# Patient Record
Sex: FEMALE | Race: BLACK OR AFRICAN AMERICAN | Employment: OTHER | URBAN - NONMETROPOLITAN AREA
[De-identification: names, ages, dates, MRNs, and addresses within clinical notes are randomized per-mention and may not be internally consistent; named-entity substitution may affect disease eponyms.]

---

## 2021-04-14 ENCOUNTER — HOSPITAL ENCOUNTER (OUTPATIENT)
Dept: GENERAL RADIOLOGY | Age: 81
Discharge: HOME | End: 2021-04-14
Payer: MEDICARE

## 2021-04-14 ENCOUNTER — TRANSCRIBE ORDER (OUTPATIENT)
Dept: REGISTRATION | Age: 81
End: 2021-04-14

## 2021-04-14 DIAGNOSIS — J44.1 OBSTRUCTIVE CHRONIC BRONCHITIS WITH EXACERBATION (HCC): Primary | ICD-10-CM

## 2021-04-14 DIAGNOSIS — J44.1 OBSTRUCTIVE CHRONIC BRONCHITIS WITH EXACERBATION (HCC): ICD-10-CM

## 2021-04-14 PROCEDURE — 71046 X-RAY EXAM CHEST 2 VIEWS: CPT

## 2021-10-27 ENCOUNTER — TRANSCRIBE ORDER (OUTPATIENT)
Dept: REGISTRATION | Age: 81
End: 2021-10-27

## 2021-10-27 ENCOUNTER — HOSPITAL ENCOUNTER (OUTPATIENT)
Dept: GENERAL RADIOLOGY | Age: 81
Discharge: HOME | End: 2021-10-27
Payer: MEDICARE

## 2021-10-27 DIAGNOSIS — J44.1 OBSTRUCTIVE CHRONIC BRONCHITIS WITH EXACERBATION (HCC): ICD-10-CM

## 2021-10-27 DIAGNOSIS — J44.1 OBSTRUCTIVE CHRONIC BRONCHITIS WITH EXACERBATION (HCC): Primary | ICD-10-CM

## 2021-10-27 PROCEDURE — 71046 X-RAY EXAM CHEST 2 VIEWS: CPT

## 2022-01-10 ENCOUNTER — HOSPITAL ENCOUNTER (EMERGENCY)
Age: 82
Discharge: HOME | End: 2022-01-10
Attending: EMERGENCY MEDICINE
Payer: MEDICARE

## 2022-01-10 ENCOUNTER — APPOINTMENT (OUTPATIENT)
Dept: GENERAL RADIOLOGY | Age: 82
End: 2022-01-10
Attending: EMERGENCY MEDICINE
Payer: MEDICARE

## 2022-01-10 VITALS
BODY MASS INDEX: 32.02 KG/M2 | HEART RATE: 73 BPM | HEIGHT: 62 IN | TEMPERATURE: 98.4 F | DIASTOLIC BLOOD PRESSURE: 66 MMHG | SYSTOLIC BLOOD PRESSURE: 156 MMHG | OXYGEN SATURATION: 95 % | WEIGHT: 174 LBS | RESPIRATION RATE: 20 BRPM

## 2022-01-10 DIAGNOSIS — R07.9 CHEST PAIN, UNSPECIFIED TYPE: ICD-10-CM

## 2022-01-10 DIAGNOSIS — J45.901 PERSISTENT ASTHMA WITH ACUTE EXACERBATION, UNSPECIFIED ASTHMA SEVERITY: Primary | ICD-10-CM

## 2022-01-10 LAB
ALBUMIN SERPL-MCNC: 4.2 G/DL (ref 3.5–4.7)
ALBUMIN/GLOB SERPL: 1.1 {RATIO}
ALP SERPL-CCNC: 77 U/L (ref 38–126)
ALT SERPL-CCNC: 12 U/L (ref 3–52)
ANION GAP SERPL CALC-SCNC: 11 MMOL/L
AST SERPL W P-5'-P-CCNC: 17 U/L (ref 14–74)
BASOPHILS # BLD: 0 K/UL (ref 0–0.1)
BASOPHILS NFR BLD: 0 % (ref 0–2)
BILIRUB SERPL-MCNC: 0.7 MG/DL (ref 0.2–1)
BNP SERPL-MCNC: 160 PG/ML (ref 0–100)
BUN SERPL-MCNC: 9 MG/DL (ref 9–21)
BUN/CREAT SERPL: 9
CA-I BLD-MCNC: 9.8 MG/DL (ref 8.5–10.5)
CHLORIDE SERPL-SCNC: 101 MMOL/L (ref 94–111)
CO2 SERPL-SCNC: 26 MMOL/L (ref 21–33)
CREAT SERPL-MCNC: 0.99 MG/DL (ref 0.7–1.2)
D DIMER PPP FEU-MCNC: 0.8 UG/ML(FEU)
DIFFERENTIAL METHOD BLD: ABNORMAL
EOSINOPHIL # BLD: 0.8 K/UL (ref 0–0.4)
EOSINOPHIL NFR BLD: 7 % (ref 0–5)
ERYTHROCYTE [DISTWIDTH] IN BLOOD BY AUTOMATED COUNT: 14 % (ref 11.6–14.5)
GLOBULIN SER CALC-MCNC: 4 G/DL
GLUCOSE SERPL-MCNC: 115 MG/DL (ref 70–110)
HCT VFR BLD AUTO: 44.8 % (ref 35–45)
HGB BLD-MCNC: 14 G/DL (ref 12–16)
IMM GRANULOCYTES # BLD AUTO: 0 K/UL
IMM GRANULOCYTES NFR BLD AUTO: 0 %
INR PPP: 1.1 (ref 0.8–1.2)
LYMPHOCYTES # BLD: 2.6 K/UL (ref 0.9–3.6)
LYMPHOCYTES NFR BLD: 24 % (ref 21–52)
MCH RBC QN AUTO: 29.7 PG (ref 24–34)
MCHC RBC AUTO-ENTMCNC: 31.3 G/DL (ref 31–37)
MCV RBC AUTO: 94.9 FL (ref 78–100)
MONOCYTES # BLD: 1.3 K/UL (ref 0.05–1.2)
MONOCYTES NFR BLD: 12 % (ref 3–10)
NEUTS SEG # BLD: 6.3 K/UL (ref 1.8–8)
NEUTS SEG NFR BLD: 57 % (ref 40–73)
NRBC # BLD: 0 K/UL (ref 0–0.01)
NRBC BLD-RTO: 0 PER 100 WBC
PLATELET # BLD AUTO: 235 K/UL (ref 135–420)
PLATELET COMMENTS,PCOM: ADEQUATE
PMV BLD AUTO: 11 FL (ref 9.2–11.8)
POTASSIUM SERPL-SCNC: 4 MMOL/L (ref 3.2–5.1)
PROT SERPL-MCNC: 8.2 G/DL (ref 6.1–8.4)
PROTHROMBIN TIME: 13.5 SEC (ref 11.5–15.2)
RBC # BLD AUTO: 4.72 M/UL (ref 4.2–5.3)
RBC MORPH BLD: ABNORMAL
SODIUM SERPL-SCNC: 138 MMOL/L (ref 135–145)
TROPONIN I SERPL-MCNC: 0.04 NG/ML (ref 0.02–0.05)
WBC # BLD AUTO: 11 K/UL (ref 4.6–13.2)

## 2022-01-10 PROCEDURE — 99285 EMERGENCY DEPT VISIT HI MDM: CPT

## 2022-01-10 PROCEDURE — 94640 AIRWAY INHALATION TREATMENT: CPT

## 2022-01-10 PROCEDURE — 83880 ASSAY OF NATRIURETIC PEPTIDE: CPT

## 2022-01-10 PROCEDURE — 93005 ELECTROCARDIOGRAM TRACING: CPT

## 2022-01-10 PROCEDURE — 96374 THER/PROPH/DIAG INJ IV PUSH: CPT

## 2022-01-10 PROCEDURE — 74011000250 HC RX REV CODE- 250: Performed by: EMERGENCY MEDICINE

## 2022-01-10 PROCEDURE — 74011250637 HC RX REV CODE- 250/637: Performed by: EMERGENCY MEDICINE

## 2022-01-10 PROCEDURE — 71045 X-RAY EXAM CHEST 1 VIEW: CPT

## 2022-01-10 PROCEDURE — 85379 FIBRIN DEGRADATION QUANT: CPT

## 2022-01-10 PROCEDURE — 85025 COMPLETE CBC W/AUTO DIFF WBC: CPT

## 2022-01-10 PROCEDURE — 80053 COMPREHEN METABOLIC PANEL: CPT

## 2022-01-10 PROCEDURE — 84484 ASSAY OF TROPONIN QUANT: CPT

## 2022-01-10 PROCEDURE — 74011250636 HC RX REV CODE- 250/636: Performed by: EMERGENCY MEDICINE

## 2022-01-10 PROCEDURE — 36415 COLL VENOUS BLD VENIPUNCTURE: CPT

## 2022-01-10 PROCEDURE — 85610 PROTHROMBIN TIME: CPT

## 2022-01-10 RX ORDER — BENZONATATE 100 MG/1
100 CAPSULE ORAL
Status: COMPLETED | OUTPATIENT
Start: 2022-01-10 | End: 2022-01-10

## 2022-01-10 RX ORDER — IPRATROPIUM BROMIDE AND ALBUTEROL SULFATE 2.5; .5 MG/3ML; MG/3ML
3 SOLUTION RESPIRATORY (INHALATION)
Status: COMPLETED | OUTPATIENT
Start: 2022-01-10 | End: 2022-01-10

## 2022-01-10 RX ORDER — PREDNISONE 20 MG/1
60 TABLET ORAL DAILY
Qty: 12 TABLET | Refills: 0 | Status: SHIPPED | OUTPATIENT
Start: 2022-01-10 | End: 2022-01-14

## 2022-01-10 RX ORDER — BENZONATATE 100 MG/1
100 CAPSULE ORAL
Qty: 20 CAPSULE | Refills: 0 | Status: SHIPPED | OUTPATIENT
Start: 2022-01-10 | End: 2022-01-17

## 2022-01-10 RX ORDER — IPRATROPIUM BROMIDE AND ALBUTEROL SULFATE 2.5; .5 MG/3ML; MG/3ML
3 SOLUTION RESPIRATORY (INHALATION)
Status: DISCONTINUED | OUTPATIENT
Start: 2022-01-10 | End: 2022-01-10

## 2022-01-10 RX ORDER — ALBUTEROL SULFATE 90 UG/1
1-2 AEROSOL, METERED RESPIRATORY (INHALATION)
Qty: 1 G | Refills: 0 | Status: SHIPPED | OUTPATIENT
Start: 2022-01-10 | End: 2022-09-01

## 2022-01-10 RX ADMIN — BENZONATATE 100 MG: 100 CAPSULE ORAL at 20:56

## 2022-01-10 RX ADMIN — METHYLPREDNISOLONE SODIUM SUCCINATE 125 MG: 125 INJECTION, POWDER, FOR SOLUTION INTRAMUSCULAR; INTRAVENOUS at 18:28

## 2022-01-10 RX ADMIN — IPRATROPIUM BROMIDE AND ALBUTEROL SULFATE 3 ML: .5; 2.5 SOLUTION RESPIRATORY (INHALATION) at 20:58

## 2022-01-10 RX ADMIN — IPRATROPIUM BROMIDE AND ALBUTEROL SULFATE 3 ML: .5; 2.5 SOLUTION RESPIRATORY (INHALATION) at 18:14

## 2022-01-10 NOTE — ED TRIAGE NOTES
Pt states she is having some asthma exacerbation causing shortness of breath and has had chest pain that started yesterday.

## 2022-01-10 NOTE — ED PROVIDER NOTES
EMERGENCY DEPARTMENT HISTORY AND PHYSICAL EXAM      Date: 1/10/2022  Patient Name: Ella Carvajal      History of Presenting Illness     Chief Complaint   Patient presents with    Shortness of Breath    Chest Pain       History Provided By: Patient    HPI: Ella Carvajal, 78 y.o. female with a past medical history significant COPD and asthma presents to the ED with cc of SOB, Cough productive of thick yellow phlegm Started today. Has used his neb treatment multiple times but is not getting any better. No fever no chills no NVD no anosmia, ageusia. There are no other complaints, changes, or physical findings at this time. PCP: Pat Tran MD    Current Outpatient Medications   Medication Sig Dispense Refill    albuterol (PROVENTIL HFA, VENTOLIN HFA, PROAIR HFA) 90 mcg/actuation inhaler Take 1-2 Puffs by inhalation every four (4) hours as needed for Wheezing. 1 g 0    benzonatate (Tessalon Perles) 100 mg capsule Take 1 Capsule by mouth three (3) times daily as needed for Cough for up to 7 days. 20 Capsule 0    Aspirin, Buffered 81 mg tab Take  by mouth.  b complex-vitamin c-folic acid 0.8 mg (NEPHRO-FLEX) 0.8 mg tab tablet Take 1 tablet by mouth two (2) times a day.  famotidine (PEPCID) 40 mg tablet Take 40 mg by mouth daily.  alprazolam (XANAX) 0.5 mg tablet Take 0.5 mg by mouth two (2) times daily as needed for Anxiety.  verapamil (CALAN) 120 mg tablet Take 120 mg by mouth daily.  losartan (COZAAR) 50 mg tablet Take 50 mg by mouth daily.  mometasone-formoterol (DULERA) 200-5 mcg/actuation HFA inhaler Take 2 puffs by inhalation two (2) times a day.  fluticasone (FLONASE) 50 mcg/actuation nasal spray 2 sprays by Both Nostrils route nightly.          Past History     Past Medical History:  Past Medical History:   Diagnosis Date    Allergic rhinitis     Arthritis     knee - pending surg    Asthma     Chronic obstructive pulmonary disease (HCC)     mild per pulmonary notes    GERD (gastroesophageal reflux disease)     Hypertension     Thromboembolus ([address]     many years ago    Thyroid disease     cyst on thyroid       Past Surgical History:  Past Surgical History:   Procedure Laterality Date    HX APPENDECTOMY  age 23   [de-identified] HYSTERECTOMY  age 35    HX KNEE REPLACEMENT Left 2011    Dr. Cecelia Pardo Left age 23    inguinal       Family History:  History reviewed. No pertinent family history. Social History:  Social History     Tobacco Use    Smoking status: Former Smoker     Packs/day: 1.00    Smokeless tobacco: Never Used    Tobacco comment: smoke for appox 18 months at age 23   Substance Use Topics    Alcohol use: Yes     Comment: rarely    Drug use: No       Allergies: Allergies   Allergen Reactions    Ace Inhibitors Other (comments)     Not sure, was a long time ago    Ceclor [Cefaclor] Hives and Itching    Cephalosporins Rash and Itching    Ketek [Telithromycin] Hives and Itching         Review of Systems     Review of Systems   Constitutional: Negative. HENT: Negative. Respiratory: Positive for cough, shortness of breath and wheezing. Cardiovascular: Negative. Gastrointestinal: Negative. Genitourinary: Negative. Musculoskeletal: Negative. Neurological: Negative. All other systems reviewed and are negative. Physical Exam     Physical Exam  Vitals and nursing note reviewed. Constitutional:       Appearance: She is well-developed. HENT:      Head: Normocephalic and atraumatic. Cardiovascular:      Rate and Rhythm: Normal rate and regular rhythm. Pulmonary:      Effort: Tachypnea and respiratory distress present. Breath sounds: Decreased breath sounds and rhonchi present. Chest:      Chest wall: No mass or tenderness. Abdominal:      Palpations: Abdomen is soft. Tenderness: There is no abdominal tenderness. Skin:     General: Skin is warm and dry.    Neurological:      General: No focal deficit present. Mental Status: She is alert and oriented to person, place, and time. Lab and Diagnostic Study Results     Labs -   No results found for this or any previous visit (from the past 12 hour(s)). Radiologic Studies -   [unfilled]  CT Results  (Last 48 hours)    None        CXR Results  (Last 48 hours)    None          Medical Decision Making and ED Course   - I am the first and primary provider for this patient AND AM THE PRIMARY PROVIDER OF RECORD. - I reviewed the vital signs, available nursing notes, past medical history, past surgical history, family history and social history. - Initial assessment performed. The patients presenting problems have been discussed, and the staff are in agreement with the care plan formulated and outlined with them. I have encouraged them to ask questions as they arise throughout their visit. Vital Signs-Reviewed the patient's vital signs. No data found. EKG interpretation: (Preliminary): Performed at 5:58p, and read at 5:58p  Rhythm: sinus tachycardia; and regular . Rate (approx.): 110; Axis: normal; DC interval: normal; QRS interval: normal ; ST/T wave: non-specific changes; Other findings: left ventricular hypertrophy.       Records Reviewed: Nursing Notes and Old Medical Records    The patient presents with     ED Course:              Provider Notes (Medical Decision Making):     MDM  Number of Diagnoses or Management Options     Amount and/or Complexity of Data Reviewed  Clinical lab tests: ordered  Tests in the radiology section of CPT®: ordered    Risk of Complications, Morbidity, and/or Mortality  Presenting problems: high  Diagnostic procedures: high  Management options: high  General comments: Endorsed to Dr Ruby Liu at 7 05pm               Consultations:       Consultations:         Procedures and Critical Care       Performed by: Traci Lynch MD  PROCEDURES:  Procedures                     Disposition     Disposition: [place] Discharge: I informed the pt that they needed admission, further observation and further workup for adequate evaluation for their Asthma/SOB and that by refusing the above, they at risk for myocardial infarction, sudden death and further deterioration. They are awake, alert, and they understand their condition and the risks involved in leaving. They are clinically aware of their surroundings and able to ask appropriate questions. The patients  and the nurse present confirms They are not clinically intoxicated and able to make medical decisions. They have verbalized knowing the risks and understood it was recommended that they stay and could also return at any time. The patient's  was present for the discussion and also verbalized that they understood both diagnosis, risks and could return at any time. They were both provided with warnings regarding worsening of Their condition and were provided instructions to follow up with their PCP tomorrow or return to the Emergency Room as soon as possible. This discussion was witnessed by nurse RN. Discharged    Remove if not discharged  DISCHARGE PLAN:  1. Current Discharge Medication List      CONTINUE these medications which have NOT CHANGED    Details   Aspirin, Buffered 81 mg tab Take  by mouth.      b complex-vitamin c-folic acid 0.8 mg (NEPHRO-FLEX) 0.8 mg tab tablet Take 1 tablet by mouth two (2) times a day. famotidine (PEPCID) 40 mg tablet Take 40 mg by mouth daily. alprazolam (XANAX) 0.5 mg tablet Take 0.5 mg by mouth two (2) times daily as needed for Anxiety. verapamil (CALAN) 120 mg tablet Take 120 mg by mouth daily. losartan (COZAAR) 50 mg tablet Take 50 mg by mouth daily. mometasone-formoterol (DULERA) 200-5 mcg/actuation HFA inhaler Take 2 puffs by inhalation two (2) times a day. fluticasone (FLONASE) 50 mcg/actuation nasal spray 2 sprays by Both Nostrils route nightly.       albuterol (VENTOLIN HFA) 90 mcg/actuation inhaler Take 2 puffs by inhalation every four (4) hours as needed for Wheezing. 2.   Follow-up Information     Follow up With Specialties Details Why Contact Info    [facility] EMERGENCY DEPT Emergency Medicine Go to  As needed, If symptoms worsen [place] [address] [place] [address], [address], MD Internal Medicine   [address]  [address]  [address] [facility]  [phone]          3. Return to ED if worse   4. Discharge Medication List as of 1/10/2022  9:40 PM      START taking these medications    Details   albuterol (PROVENTIL HFA, VENTOLIN HFA, PROAIR HFA) 90 mcg/actuation inhaler Take 1-2 Puffs by inhalation every four (4) hours as needed for Wheezing., Normal, Disp-1 g, R-0      benzonatate (Tessalon Perles) 100 mg capsule Take 1 Capsule by mouth three (3) times daily as needed for Cough for up to 7 days. , Normal, Disp-20 Capsule, R-0      predniSONE (DELTASONE) 20 mg tablet Take 60 mg by mouth daily for 4 days. , Normal, Disp-12 Tablet, R-0         CONTINUE these medications which have NOT CHANGED    Details   Aspirin, Buffered 81 mg tab Take  by mouth., Historical Med      b complex-vitamin c-folic acid 0.8 mg (NEPHRO-FLEX) 0.8 mg tab tablet Take 1 tablet by mouth two (2) times a day., Historical Med      famotidine (PEPCID) 40 mg tablet Take 40 mg by mouth daily. , Historical Med      alprazolam (XANAX) 0.5 mg tablet Take 0.5 mg by mouth two (2) times daily as needed for Anxiety. , Historical Med      verapamil (CALAN) 120 mg tablet Take 120 mg by mouth daily. , Historical Med      losartan (COZAAR) 50 mg tablet Take 50 mg by mouth daily. , Historical Med      mometasone-formoterol (DULERA) 200-5 mcg/actuation HFA inhaler Take 2 puffs by inhalation two (2) times a day., Historical Med      fluticasone (FLONASE) 50 mcg/actuation nasal spray 2 sprays by Both Nostrils route nightly., Historical Med             Diagnosis     Clinical Impression:   1.  Persistent asthma with acute exacerbation, unspecified asthma severity    2. Chest pain, unspecified type        Attestations:    Ho Alberto MD    Please note that this dictation was completed with [employer], the computer voice recognition software. Quite often unanticipated grammatical, syntax, homophones, and other interpretive errors are inadvertently transcribed by the computer software. Please disregard these errors. Please excuse any errors that have escaped final proofreading. Thank you.

## 2022-01-11 NOTE — DISCHARGE INSTRUCTIONS
Return for further observation/admission if you change your mind. Also for pain, fever not resolving with motrin or tylenol, shortness of breath, vomiting, decreased fluid intake, weakness, numbness, dizziness, or any change or concerns.

## 2022-01-11 NOTE — ED NOTES
9:40 PM pt denies current sx's except mild cough, feels much better, declines staying for repeat labs inc trop and testing/admission. Wants dc now, says much better. Pt a and x 3, to dc per pt. Urged to ret for any sx's. Dc per pt w detailed ret inst given.

## 2022-01-12 LAB
ATRIAL RATE: 110 BPM
CALCULATED P AXIS, ECG09: 65 DEGREES
CALCULATED R AXIS, ECG10: 60 DEGREES
CALCULATED T AXIS, ECG11: -84 DEGREES
DIAGNOSIS, 93000: NORMAL
P-R INTERVAL, ECG05: 180 MS
Q-T INTERVAL, ECG07: 295 MS
QRS DURATION, ECG06: 82 MS
QTC CALCULATION (BEZET), ECG08: 400 MS
VENTRICULAR RATE, ECG03: 110 BPM

## 2022-09-01 ENCOUNTER — APPOINTMENT (OUTPATIENT)
Dept: CT IMAGING | Age: 82
End: 2022-09-01
Attending: EMERGENCY MEDICINE
Payer: MEDICARE

## 2022-09-01 ENCOUNTER — HOSPITAL ENCOUNTER (OUTPATIENT)
Age: 82
Setting detail: OBSERVATION
Discharge: HOME | End: 2022-09-04
Attending: EMERGENCY MEDICINE | Admitting: INTERNAL MEDICINE
Payer: MEDICARE

## 2022-09-01 DIAGNOSIS — K57.92 DIVERTICULITIS: ICD-10-CM

## 2022-09-01 DIAGNOSIS — K85.90 ACUTE PANCREATITIS, UNSPECIFIED COMPLICATION STATUS, UNSPECIFIED PANCREATITIS TYPE: ICD-10-CM

## 2022-09-01 DIAGNOSIS — K57.90 DIVERTICULOSIS: ICD-10-CM

## 2022-09-01 DIAGNOSIS — I10 HYPERTENSION, UNSPECIFIED TYPE: ICD-10-CM

## 2022-09-01 DIAGNOSIS — R77.8 ELEVATED TROPONIN: ICD-10-CM

## 2022-09-01 DIAGNOSIS — M54.30 SCIATICA, UNSPECIFIED LATERALITY: Primary | ICD-10-CM

## 2022-09-01 DIAGNOSIS — J06.9 ACUTE UPPER RESPIRATORY INFECTION: ICD-10-CM

## 2022-09-01 LAB
ALBUMIN SERPL-MCNC: 3.1 G/DL (ref 3.4–5)
ALBUMIN/GLOB SERPL: 0.6 {RATIO} (ref 0.8–1.7)
ALP SERPL-CCNC: 76 U/L (ref 45–117)
ALT SERPL-CCNC: 10 U/L (ref 13–56)
ANION GAP SERPL CALC-SCNC: 8 MMOL/L (ref 3–18)
APPEARANCE UR: CLEAR
AST SERPL W P-5'-P-CCNC: 10 U/L (ref 10–38)
BACTERIA URNS QL MICRO: ABNORMAL /HPF
BASOPHILS # BLD: 0.1 K/UL (ref 0–0.1)
BASOPHILS NFR BLD: 1 % (ref 0–2)
BILIRUB DIRECT SERPL-MCNC: 0.2 MG/DL (ref 0–0.2)
BILIRUB SERPL-MCNC: 0.6 MG/DL (ref 0.2–1)
BILIRUB UR QL: NEGATIVE
BUN SERPL-MCNC: 10 MG/DL (ref 7–18)
BUN/CREAT SERPL: 10 (ref 12–20)
CA-I BLD-MCNC: 9.5 MG/DL (ref 8.5–10.1)
CHLORIDE SERPL-SCNC: 102 MMOL/L (ref 100–111)
CO2 SERPL-SCNC: 25 MMOL/L (ref 21–32)
COLOR UR: ABNORMAL
CREAT SERPL-MCNC: 1.03 MG/DL (ref 0.6–1.3)
DIFFERENTIAL METHOD BLD: ABNORMAL
EOSINOPHIL # BLD: 0.4 K/UL (ref 0–0.4)
EOSINOPHIL NFR BLD: 4 % (ref 0–5)
EPITH CASTS URNS QL MICRO: ABNORMAL /LPF (ref 0–20)
ERYTHROCYTE [DISTWIDTH] IN BLOOD BY AUTOMATED COUNT: 13.2 % (ref 11.6–14.5)
GLOBULIN SER CALC-MCNC: 5 G/DL (ref 2–4)
GLUCOSE SERPL-MCNC: 98 MG/DL (ref 74–99)
GLUCOSE UR STRIP.AUTO-MCNC: NEGATIVE MG/DL
HCT VFR BLD AUTO: 44.8 % (ref 35–45)
HGB BLD-MCNC: 14.6 G/DL (ref 12–16)
HGB UR QL STRIP: NEGATIVE
HYALINE CASTS URNS QL MICRO: ABNORMAL /LPF
IMM GRANULOCYTES # BLD AUTO: 0.1 K/UL (ref 0–0.04)
IMM GRANULOCYTES NFR BLD AUTO: 1 % (ref 0–0.5)
KETONES UR QL STRIP.AUTO: ABNORMAL MG/DL
LEUKOCYTE ESTERASE UR QL STRIP.AUTO: ABNORMAL
LIPASE SERPL-CCNC: 605 U/L (ref 73–393)
LYMPHOCYTES # BLD: 1.9 K/UL (ref 0.9–3.6)
LYMPHOCYTES NFR BLD: 18 % (ref 21–52)
MCH RBC QN AUTO: 30.4 PG (ref 24–34)
MCHC RBC AUTO-ENTMCNC: 32.6 G/DL (ref 31–37)
MCV RBC AUTO: 93.3 FL (ref 78–100)
MONOCYTES # BLD: 1.1 K/UL (ref 0.05–1.2)
MONOCYTES NFR BLD: 11 % (ref 3–10)
NEUTS SEG # BLD: 6.9 K/UL (ref 1.8–8)
NEUTS SEG NFR BLD: 65 % (ref 40–73)
NITRITE UR QL STRIP.AUTO: NEGATIVE
NRBC # BLD: 0 K/UL (ref 0–0.01)
NRBC BLD-RTO: 0 PER 100 WBC
PH UR STRIP: 5.5 [PH] (ref 5–8)
PLATELET # BLD AUTO: 219 K/UL (ref 135–420)
PMV BLD AUTO: 10.4 FL (ref 9.2–11.8)
POTASSIUM SERPL-SCNC: 3.7 MMOL/L (ref 3.5–5.5)
PROT SERPL-MCNC: 8.1 G/DL (ref 6.4–8.2)
PROT UR STRIP-MCNC: 30 MG/DL
RBC # BLD AUTO: 4.8 M/UL (ref 4.2–5.3)
RBC #/AREA URNS HPF: ABNORMAL /HPF (ref 0–2)
SODIUM SERPL-SCNC: 135 MMOL/L (ref 136–145)
SP GR UR REFRACTOMETRY: 1.02 (ref 1–1.03)
TROPONIN-HIGH SENSITIVITY: 68 NG/L (ref 0–54)
UROBILINOGEN UR QL STRIP.AUTO: 1 EU/DL (ref 0.2–1)
WBC # BLD AUTO: 10.5 K/UL (ref 4.6–13.2)
WBC URNS QL MICRO: ABNORMAL /HPF (ref 0–4)

## 2022-09-01 PROCEDURE — 74011250637 HC RX REV CODE- 250/637: Performed by: EMERGENCY MEDICINE

## 2022-09-01 PROCEDURE — 85025 COMPLETE CBC W/AUTO DIFF WBC: CPT

## 2022-09-01 PROCEDURE — 71250 CT THORAX DX C-: CPT

## 2022-09-01 PROCEDURE — 80076 HEPATIC FUNCTION PANEL: CPT

## 2022-09-01 PROCEDURE — 81001 URINALYSIS AUTO W/SCOPE: CPT

## 2022-09-01 PROCEDURE — 84484 ASSAY OF TROPONIN QUANT: CPT

## 2022-09-01 PROCEDURE — 93005 ELECTROCARDIOGRAM TRACING: CPT

## 2022-09-01 PROCEDURE — 83690 ASSAY OF LIPASE: CPT

## 2022-09-01 PROCEDURE — 99285 EMERGENCY DEPT VISIT HI MDM: CPT

## 2022-09-01 PROCEDURE — 80048 BASIC METABOLIC PNL TOTAL CA: CPT

## 2022-09-01 RX ORDER — FLUTICASONE PROPIONATE 50 MCG
1 SPRAY, SUSPENSION (ML) NASAL DAILY
Status: ON HOLD | COMMUNITY
End: 2022-09-02

## 2022-09-01 RX ORDER — FLUTICASONE PROPIONATE AND SALMETEROL 250; 50 UG/1; UG/1
1 POWDER RESPIRATORY (INHALATION) EVERY 12 HOURS
COMMUNITY

## 2022-09-01 RX ORDER — CYCLOBENZAPRINE HCL 10 MG
10 TABLET ORAL
Status: COMPLETED | OUTPATIENT
Start: 2022-09-01 | End: 2022-09-01

## 2022-09-01 RX ORDER — CARVEDILOL 25 MG/1
1 TABLET ORAL 2 TIMES DAILY WITH MEALS
COMMUNITY

## 2022-09-01 RX ORDER — ALBUTEROL SULFATE 0.83 MG/ML
2.5 SOLUTION RESPIRATORY (INHALATION)
COMMUNITY

## 2022-09-01 RX ORDER — METRONIDAZOLE 250 MG/1
500 TABLET ORAL
Status: COMPLETED | OUTPATIENT
Start: 2022-09-01 | End: 2022-09-01

## 2022-09-01 RX ORDER — GUAIFENESIN 100 MG/5ML
LIQUID (ML) ORAL
COMMUNITY

## 2022-09-01 RX ORDER — LEVOFLOXACIN 250 MG/1
500 TABLET ORAL
Status: COMPLETED | OUTPATIENT
Start: 2022-09-01 | End: 2022-09-01

## 2022-09-01 RX ORDER — AMLODIPINE BESYLATE 5 MG/1
TABLET ORAL
COMMUNITY
End: 2022-09-04

## 2022-09-01 RX ORDER — ACETAMINOPHEN AND CODEINE PHOSPHATE 300; 30 MG/1; MG/1
1 TABLET ORAL
Status: COMPLETED | OUTPATIENT
Start: 2022-09-01 | End: 2022-09-01

## 2022-09-01 RX ADMIN — CYCLOBENZAPRINE 10 MG: 10 TABLET, FILM COATED ORAL at 20:33

## 2022-09-01 RX ADMIN — METRONIDAZOLE 500 MG: 250 TABLET ORAL at 22:55

## 2022-09-01 RX ADMIN — LEVOFLOXACIN 500 MG: 250 TABLET, FILM COATED ORAL at 22:55

## 2022-09-01 RX ADMIN — ACETAMINOPHEN AND CODEINE PHOSPHATE 1 TABLET: 300; 30 TABLET ORAL at 20:33

## 2022-09-02 ENCOUNTER — APPOINTMENT (OUTPATIENT)
Dept: NON INVASIVE DIAGNOSTICS | Age: 82
End: 2022-09-02
Attending: NURSE PRACTITIONER
Payer: MEDICARE

## 2022-09-02 PROBLEM — R77.8 ELEVATED TROPONIN: Status: ACTIVE | Noted: 2022-09-02

## 2022-09-02 PROBLEM — M54.30 SCIATICA: Status: ACTIVE | Noted: 2022-09-02

## 2022-09-02 PROBLEM — K57.92 DIVERTICULITIS: Status: ACTIVE | Noted: 2022-09-02

## 2022-09-02 PROBLEM — R79.89 ELEVATED TROPONIN: Status: ACTIVE | Noted: 2022-09-02

## 2022-09-02 LAB
ANION GAP SERPL CALC-SCNC: 5 MMOL/L (ref 3–18)
ATRIAL RATE: 110 BPM
BASOPHILS # BLD: 0.1 K/UL (ref 0–0.1)
BASOPHILS NFR BLD: 1 % (ref 0–2)
BUN SERPL-MCNC: 10 MG/DL (ref 7–18)
BUN/CREAT SERPL: 11 (ref 12–20)
CA-I BLD-MCNC: 9.4 MG/DL (ref 8.5–10.1)
CALCULATED P AXIS, ECG09: 30 DEGREES
CALCULATED R AXIS, ECG10: 13 DEGREES
CALCULATED T AXIS, ECG11: 29 DEGREES
CHLORIDE SERPL-SCNC: 103 MMOL/L (ref 100–111)
CO2 SERPL-SCNC: 28 MMOL/L (ref 21–32)
CREAT SERPL-MCNC: 0.89 MG/DL (ref 0.6–1.3)
DIAGNOSIS, 93000: NORMAL
DIFFERENTIAL METHOD BLD: ABNORMAL
ECHO AO ASC DIAM: 3.3 CM
ECHO AO ASCENDING AORTA INDEX: 1.9 CM/M2
ECHO AO ROOT DIAM: 3.4 CM
ECHO AO ROOT INDEX: 1.95 CM/M2
ECHO AV AREA PEAK VELOCITY: 2.6 CM2
ECHO AV AREA VTI: 2.9 CM2
ECHO AV AREA/BSA PEAK VELOCITY: 1.5 CM2/M2
ECHO AV AREA/BSA VTI: 1.7 CM2/M2
ECHO AV MEAN GRADIENT: 4 MMHG
ECHO AV MEAN VELOCITY: 0.9 M/S
ECHO AV PEAK GRADIENT: 7 MMHG
ECHO AV PEAK VELOCITY: 1.3 M/S
ECHO AV VELOCITY RATIO: 0.77
ECHO AV VTI: 25.7 CM
ECHO EST RA PRESSURE: 3 MMHG
ECHO IVC PROX: 1.4 CM
ECHO LA AREA 2C: 29.6 CM2
ECHO LA AREA 4C: 21.9 CM2
ECHO LA DIAMETER INDEX: 2.24 CM/M2
ECHO LA DIAMETER: 3.9 CM
ECHO LA MAJOR AXIS: 5.6 CM
ECHO LA MINOR AXIS: 6.5 CM
ECHO LA TO AORTIC ROOT RATIO: 1.15
ECHO LA VOL BP: 94 ML (ref 22–52)
ECHO LA VOL/BSA BIPLANE: 54 ML/M2 (ref 16–34)
ECHO LV E' LATERAL VELOCITY: 9 CM/S
ECHO LV E' SEPTAL VELOCITY: 6 CM/S
ECHO LV EDV A2C: 88 ML
ECHO LV EDV A4C: 85 ML
ECHO LV EDV INDEX A4C: 49 ML/M2
ECHO LV EDV NDEX A2C: 51 ML/M2
ECHO LV EJECTION FRACTION A2C: 29 %
ECHO LV EJECTION FRACTION A4C: 37 %
ECHO LV EJECTION FRACTION BIPLANE: 34 % (ref 55–100)
ECHO LV ESV A2C: 63 ML
ECHO LV ESV A4C: 53 ML
ECHO LV ESV INDEX A2C: 36 ML/M2
ECHO LV ESV INDEX A4C: 30 ML/M2
ECHO LV FRACTIONAL SHORTENING: 12 % (ref 28–44)
ECHO LV INTERNAL DIMENSION DIASTOLE INDEX: 2.82 CM/M2
ECHO LV INTERNAL DIMENSION DIASTOLIC: 4.9 CM (ref 3.9–5.3)
ECHO LV INTERNAL DIMENSION SYSTOLIC INDEX: 2.47 CM/M2
ECHO LV INTERNAL DIMENSION SYSTOLIC: 4.3 CM
ECHO LV IVSD: 1.3 CM (ref 0.6–0.9)
ECHO LV MASS 2D: 267.9 G (ref 67–162)
ECHO LV MASS INDEX 2D: 154 G/M2 (ref 43–95)
ECHO LV POSTERIOR WALL DIASTOLIC: 1.4 CM (ref 0.6–0.9)
ECHO LV RELATIVE WALL THICKNESS RATIO: 0.57
ECHO LVOT AREA: 3.5 CM2
ECHO LVOT AV VTI INDEX: 0.83
ECHO LVOT DIAM: 2.1 CM
ECHO LVOT MEAN GRADIENT: 2 MMHG
ECHO LVOT PEAK GRADIENT: 4 MMHG
ECHO LVOT PEAK VELOCITY: 1 M/S
ECHO LVOT STROKE VOLUME INDEX: 42.6 ML/M2
ECHO LVOT SV: 74.1 ML
ECHO LVOT VTI: 21.4 CM
ECHO MV AREA VTI: 3.8 CM2
ECHO MV LVOT VTI INDEX: 0.9
ECHO MV MAX VELOCITY: 0.9 M/S
ECHO MV MEAN GRADIENT: 2 MMHG
ECHO MV MEAN VELOCITY: 0.6 M/S
ECHO MV PEAK GRADIENT: 3 MMHG
ECHO MV VTI: 19.3 CM
ECHO PV MAX VELOCITY: 1.1 M/S
ECHO PV PEAK GRADIENT: 4 MMHG
ECHO RA AREA 4C: 15.5 CM2
ECHO RA END SYSTOLIC VOLUME APICAL 4 CHAMBER INDEX BSA: 24 ML/M2
ECHO RA VOLUME: 42 ML
ECHO RIGHT VENTRICULAR SYSTOLIC PRESSURE (RVSP): 26 MMHG
ECHO RV BASAL DIMENSION: 3.5 CM
ECHO RV LONGITUDINAL DIMENSION: 5.7 CM
ECHO RV MID DIMENSION: 2.5 CM
ECHO RV TAPSE: 1.8 CM (ref 1.7–?)
ECHO TV REGURGITANT MAX VELOCITY: 2.38 M/S
ECHO TV REGURGITANT PEAK GRADIENT: 23 MMHG
EOSINOPHIL # BLD: 0.4 K/UL (ref 0–0.4)
EOSINOPHIL NFR BLD: 4 % (ref 0–5)
ERYTHROCYTE [DISTWIDTH] IN BLOOD BY AUTOMATED COUNT: 13.2 % (ref 11.6–14.5)
GLUCOSE SERPL-MCNC: 98 MG/DL (ref 74–99)
HCT VFR BLD AUTO: 42.3 % (ref 35–45)
HGB BLD-MCNC: 13.3 G/DL (ref 12–16)
IMM GRANULOCYTES # BLD AUTO: 0 K/UL (ref 0–0.04)
IMM GRANULOCYTES NFR BLD AUTO: 1 % (ref 0–0.5)
LYMPHOCYTES # BLD: 1.7 K/UL (ref 0.9–3.6)
LYMPHOCYTES NFR BLD: 19 % (ref 21–52)
MAGNESIUM SERPL-MCNC: 2.2 MG/DL (ref 1.6–2.6)
MCH RBC QN AUTO: 29.8 PG (ref 24–34)
MCHC RBC AUTO-ENTMCNC: 31.4 G/DL (ref 31–37)
MCV RBC AUTO: 94.6 FL (ref 78–100)
MONOCYTES # BLD: 1.1 K/UL (ref 0.05–1.2)
MONOCYTES NFR BLD: 13 % (ref 3–10)
NEUTS SEG # BLD: 5.4 K/UL (ref 1.8–8)
NEUTS SEG NFR BLD: 62 % (ref 40–73)
NRBC # BLD: 0 K/UL (ref 0–0.01)
NRBC BLD-RTO: 0 PER 100 WBC
P-R INTERVAL, ECG05: 218 MS
PLATELET # BLD AUTO: 332 K/UL (ref 135–420)
PMV BLD AUTO: 10 FL (ref 9.2–11.8)
POTASSIUM SERPL-SCNC: 3.5 MMOL/L (ref 3.5–5.5)
Q-T INTERVAL, ECG07: 457 MS
QRS DURATION, ECG06: 91 MS
QTC CALCULATION (BEZET), ECG08: 442 MS
RBC # BLD AUTO: 4.47 M/UL (ref 4.2–5.3)
SODIUM SERPL-SCNC: 136 MMOL/L (ref 136–145)
TROPONIN-HIGH SENSITIVITY: 61 NG/L (ref 0–54)
TROPONIN-HIGH SENSITIVITY: 66 NG/L (ref 0–54)
VENTRICULAR RATE, ECG03: 56 BPM
WBC # BLD AUTO: 8.7 K/UL (ref 4.6–13.2)

## 2022-09-02 PROCEDURE — 94761 N-INVAS EAR/PLS OXIMETRY MLT: CPT

## 2022-09-02 PROCEDURE — 85025 COMPLETE CBC W/AUTO DIFF WBC: CPT

## 2022-09-02 PROCEDURE — 74011250637 HC RX REV CODE- 250/637: Performed by: NURSE PRACTITIONER

## 2022-09-02 PROCEDURE — 93306 TTE W/DOPPLER COMPLETE: CPT

## 2022-09-02 PROCEDURE — 96372 THER/PROPH/DIAG INJ SC/IM: CPT

## 2022-09-02 PROCEDURE — G0378 HOSPITAL OBSERVATION PER HR: HCPCS

## 2022-09-02 PROCEDURE — 94640 AIRWAY INHALATION TREATMENT: CPT

## 2022-09-02 PROCEDURE — 84484 ASSAY OF TROPONIN QUANT: CPT

## 2022-09-02 PROCEDURE — 74011000250 HC RX REV CODE- 250: Performed by: NURSE PRACTITIONER

## 2022-09-02 PROCEDURE — 74011250637 HC RX REV CODE- 250/637: Performed by: INTERNAL MEDICINE

## 2022-09-02 PROCEDURE — 36415 COLL VENOUS BLD VENIPUNCTURE: CPT

## 2022-09-02 PROCEDURE — 80048 BASIC METABOLIC PNL TOTAL CA: CPT

## 2022-09-02 PROCEDURE — 97161 PT EVAL LOW COMPLEX 20 MIN: CPT

## 2022-09-02 PROCEDURE — 83735 ASSAY OF MAGNESIUM: CPT

## 2022-09-02 PROCEDURE — 74011250636 HC RX REV CODE- 250/636: Performed by: NURSE PRACTITIONER

## 2022-09-02 RX ORDER — DOXEPIN HYDROCHLORIDE 10 MG/1
10 CAPSULE ORAL
COMMUNITY

## 2022-09-02 RX ORDER — ALBUTEROL SULFATE 0.83 MG/ML
2.5 SOLUTION RESPIRATORY (INHALATION)
Status: DISCONTINUED | OUTPATIENT
Start: 2022-09-02 | End: 2022-09-04 | Stop reason: HOSPADM

## 2022-09-02 RX ORDER — LEVETIRACETAM 250 MG/1
500 TABLET ORAL 2 TIMES DAILY
Status: DISCONTINUED | OUTPATIENT
Start: 2022-09-02 | End: 2022-09-04 | Stop reason: HOSPADM

## 2022-09-02 RX ORDER — SODIUM CHLORIDE 0.9 % (FLUSH) 0.9 %
5-40 SYRINGE (ML) INJECTION EVERY 8 HOURS
Status: DISCONTINUED | OUTPATIENT
Start: 2022-09-02 | End: 2022-09-02

## 2022-09-02 RX ORDER — GABAPENTIN 300 MG/1
300 CAPSULE ORAL 3 TIMES DAILY
COMMUNITY
End: 2022-09-19

## 2022-09-02 RX ORDER — ACETAMINOPHEN 650 MG/1
650 SUPPOSITORY RECTAL
Status: DISCONTINUED | OUTPATIENT
Start: 2022-09-02 | End: 2022-09-04 | Stop reason: HOSPADM

## 2022-09-02 RX ORDER — FLUTICASONE PROPIONATE AND SALMETEROL 250; 50 UG/1; UG/1
1 POWDER RESPIRATORY (INHALATION) EVERY 12 HOURS
Status: DISCONTINUED | OUTPATIENT
Start: 2022-09-02 | End: 2022-09-02

## 2022-09-02 RX ORDER — ONDANSETRON 4 MG/1
4 TABLET, ORALLY DISINTEGRATING ORAL
Status: DISCONTINUED | OUTPATIENT
Start: 2022-09-02 | End: 2022-09-04 | Stop reason: HOSPADM

## 2022-09-02 RX ORDER — FLUTICASONE PROPIONATE 50 MCG
2 SPRAY, SUSPENSION (ML) NASAL
Status: DISCONTINUED | OUTPATIENT
Start: 2022-09-02 | End: 2022-09-02

## 2022-09-02 RX ORDER — GUAIFENESIN 100 MG/5ML
81 LIQUID (ML) ORAL DAILY
Status: DISCONTINUED | OUTPATIENT
Start: 2022-09-02 | End: 2022-09-04 | Stop reason: HOSPADM

## 2022-09-02 RX ORDER — CYCLOSPORINE 0.5 MG/ML
1 EMULSION OPHTHALMIC EVERY 12 HOURS
COMMUNITY

## 2022-09-02 RX ORDER — CARVEDILOL 12.5 MG/1
25 TABLET ORAL 2 TIMES DAILY WITH MEALS
Status: DISCONTINUED | OUTPATIENT
Start: 2022-09-02 | End: 2022-09-04 | Stop reason: HOSPADM

## 2022-09-02 RX ORDER — LEVOFLOXACIN 500 MG/1
500 TABLET, FILM COATED ORAL EVERY 24 HOURS
Status: DISCONTINUED | OUTPATIENT
Start: 2022-09-02 | End: 2022-09-03

## 2022-09-02 RX ORDER — MONTELUKAST SODIUM 10 MG/1
10 TABLET ORAL DAILY
COMMUNITY

## 2022-09-02 RX ORDER — ACETAMINOPHEN 325 MG/1
650 TABLET ORAL
Status: DISCONTINUED | OUTPATIENT
Start: 2022-09-02 | End: 2022-09-04 | Stop reason: HOSPADM

## 2022-09-02 RX ORDER — METRONIDAZOLE 250 MG/1
500 TABLET ORAL EVERY 12 HOURS
Status: DISCONTINUED | OUTPATIENT
Start: 2022-09-02 | End: 2022-09-04 | Stop reason: HOSPADM

## 2022-09-02 RX ORDER — FLUTICASONE PROPIONATE 50 MCG
1 SPRAY, SUSPENSION (ML) NASAL DAILY
Status: DISCONTINUED | OUTPATIENT
Start: 2022-09-02 | End: 2022-09-02 | Stop reason: SDUPTHER

## 2022-09-02 RX ORDER — ENOXAPARIN SODIUM 100 MG/ML
40 INJECTION SUBCUTANEOUS DAILY
Status: DISCONTINUED | OUTPATIENT
Start: 2022-09-02 | End: 2022-09-04 | Stop reason: HOSPADM

## 2022-09-02 RX ORDER — SODIUM CHLORIDE 0.9 % (FLUSH) 0.9 %
5-40 SYRINGE (ML) INJECTION EVERY 8 HOURS
Status: DISCONTINUED | OUTPATIENT
Start: 2022-09-02 | End: 2022-09-04 | Stop reason: HOSPADM

## 2022-09-02 RX ORDER — SODIUM CHLORIDE 0.9 % (FLUSH) 0.9 %
5-40 SYRINGE (ML) INJECTION AS NEEDED
Status: DISCONTINUED | OUTPATIENT
Start: 2022-09-02 | End: 2022-09-04 | Stop reason: HOSPADM

## 2022-09-02 RX ORDER — POLYETHYLENE GLYCOL 3350 17 G/17G
17 POWDER, FOR SOLUTION ORAL DAILY PRN
Status: DISCONTINUED | OUTPATIENT
Start: 2022-09-02 | End: 2022-09-04 | Stop reason: HOSPADM

## 2022-09-02 RX ORDER — FLUTICASONE PROPIONATE 50 MCG
2 SPRAY, SUSPENSION (ML) NASAL
Status: DISCONTINUED | OUTPATIENT
Start: 2022-09-02 | End: 2022-09-04 | Stop reason: HOSPADM

## 2022-09-02 RX ORDER — ONDANSETRON 2 MG/ML
4 INJECTION INTRAMUSCULAR; INTRAVENOUS
Status: DISCONTINUED | OUTPATIENT
Start: 2022-09-02 | End: 2022-09-04 | Stop reason: HOSPADM

## 2022-09-02 RX ORDER — AMLODIPINE BESYLATE 5 MG/1
5 TABLET ORAL DAILY
Status: DISCONTINUED | OUTPATIENT
Start: 2022-09-02 | End: 2022-09-03

## 2022-09-02 RX ADMIN — FLUTICASONE PROPIONATE 2 SPRAY: 50 SPRAY, METERED NASAL at 21:28

## 2022-09-02 RX ADMIN — LEVOFLOXACIN 500 MG: 500 TABLET, FILM COATED ORAL at 14:40

## 2022-09-02 RX ADMIN — CARVEDILOL 25 MG: 12.5 TABLET, FILM COATED ORAL at 09:09

## 2022-09-02 RX ADMIN — METRONIDAZOLE 500 MG: 250 TABLET ORAL at 21:27

## 2022-09-02 RX ADMIN — CARVEDILOL 25 MG: 12.5 TABLET, FILM COATED ORAL at 17:33

## 2022-09-02 RX ADMIN — SODIUM CHLORIDE, PRESERVATIVE FREE 10 ML: 5 INJECTION INTRAVENOUS at 09:19

## 2022-09-02 RX ADMIN — SODIUM CHLORIDE, PRESERVATIVE FREE 10 ML: 5 INJECTION INTRAVENOUS at 22:33

## 2022-09-02 RX ADMIN — MOMETASONE FUROATE AND FORMOTEROL FUMARATE DIHYDRATE 1 PUFF: 200; 5 AEROSOL RESPIRATORY (INHALATION) at 08:33

## 2022-09-02 RX ADMIN — MOMETASONE FUROATE AND FORMOTEROL FUMARATE DIHYDRATE 1 PUFF: 200; 5 AEROSOL RESPIRATORY (INHALATION) at 20:32

## 2022-09-02 RX ADMIN — AMLODIPINE BESYLATE 5 MG: 5 TABLET ORAL at 09:09

## 2022-09-02 RX ADMIN — METRONIDAZOLE 500 MG: 250 TABLET ORAL at 11:42

## 2022-09-02 RX ADMIN — SODIUM CHLORIDE, PRESERVATIVE FREE 10 ML: 5 INJECTION INTRAVENOUS at 14:40

## 2022-09-02 RX ADMIN — ASPIRIN 81 MG: 81 TABLET, CHEWABLE ORAL at 09:09

## 2022-09-02 RX ADMIN — LEVETIRACETAM 500 MG: 250 TABLET, FILM COATED ORAL at 11:42

## 2022-09-02 RX ADMIN — LEVETIRACETAM 500 MG: 250 TABLET, FILM COATED ORAL at 17:33

## 2022-09-02 RX ADMIN — ACETAMINOPHEN 650 MG: 325 TABLET ORAL at 09:18

## 2022-09-02 RX ADMIN — ENOXAPARIN SODIUM 40 MG: 100 INJECTION SUBCUTANEOUS at 09:09

## 2022-09-02 NOTE — PROGRESS NOTES
Problem: Mobility Impaired (Adult and Pediatric)  Goal: *Acute Goals and Plan of Care (Insert Text)  Description: Pt ambulates with MOD (I) and transfers with MOD (I) . PLOF: Community ambulator, RW or cane, (A) ADLs from aide    Outcome: Resolved/Met     Problem: Patient Education: Go to Patient Education Activity  Goal: Patient/Family Education  Outcome: Resolved/Met   PHYSICAL THERAPY EVALUATION AND DISCHARGE    Patient: Yuliana Alegria (78 y.o. female)  Date: 9/2/2022   Start Time: 0957   Stop Time: 8676  $$ Initial PT Evaluation: Low Complex 20 Min                Primary Diagnosis: Diverticulitis [K57.92]  Elevated troponin [R77.8]  Sciatica [M54.30]       Precautions: N/A       ASSESSMENT :  Based on the objective data described below, the patient presents with diverticulitis, elevated troponin and sciatica. She performs mobility without assistance with some pain noted in the low back. She is able to toilet without assistance. She returns to bed and therapist reviews 2 stretches for her back with pt stating understanding. She is left with all needs met. .    Patient does not require further skilled intervention at this level of care. PLAN :  Recommendations and Planned Interventions:   No formal PT needs identified at this time. Discharge Recommendations: Home Health, Home Physical Therapy, or Outpatient P.T. Further Equipment Recommendations for Discharge: N/A     SUBJECTIVE:   Patient stated my back and all the way down my L LE aches.     OBJECTIVE DATA SUMMARY:     Past Medical History:   Diagnosis Date    Allergic rhinitis     Arthritis     knee - pending surg    Asthma     Chronic obstructive pulmonary disease (HCC)     mild per pulmonary notes    GERD (gastroesophageal reflux disease)     Hypertension     Thromboembolus (HCC)     many years ago    Thyroid disease     cyst on thyroid     Past Surgical History:   Procedure Laterality Date    HX APPENDECTOMY  age 18    HX HYSTERECTOMY  age 35    HX KNEE REPLACEMENT Left 2011    Dr. Sara Badillo Left age 23    inguinal     Barriers to Learning/Limitations: None  Compensate with: N/A  Home Situation:   Home Situation  Home Environment: Private residence  # Steps to Enter: 0  One/Two Story Residence: One story  Living Alone: Yes  Support Systems: Caregiver/Home Care Staff, Child(cal) (has an aide 8hrs/day for 5 days a week)  Patient Expects to be Discharged to[de-identified] Home with outpatient services  Current DME Used/Available at Home: [address], [place], straight, Walker, rolling  Critical Behavior:  Neurologic State: Alert  Orientation Level: Oriented X4        Psychosocial  Purposeful Interaction: Yes  Pt Identified Daily Priority: Clinical issues (comment)  [employer] Process: Nurture loving kindness; Attend basic human needs  Caring Interventions: Reassure; Therapeutic modalities  Reassure: Therapeutic listening;Caring rounds  Therapeutic Modalities: Deep breathing; Intentional therapeutic touch     Strength:    Strength:  Within functional limits    Tone & Sensation:   Tone: Normal  Sensation: Intact  Range Of Motion:   AROM: Within functional limits  PROM: Within functional limits  Some pain with hip or back ROM but she tolerates well  Posture:  Posture (WDL): Within defined limits      Functional Mobility:  Bed Mobility:  Rolling: Modified independent  Supine to Sit: Modified independent  Sit to Supine: Modified independent  Scooting: Modified independent  Transfers:  Sit to Stand: Modified independent  Stand to Sit: Modified independent  Stand Pivot Transfers: Modified independent     Other: MOD (I) with toileting  Balance:   Sitting: Intact  Standing: Intact  Ambulation/Gait Training:  Distance (ft): 100 Feet (ft)  Assistive Device: Walker, rolling  Ambulation - Level of Assistance: Modified independent     Gait Description (WDL): Exceptions to WDL  Gait Abnormalities: Antalgic   AM-PAC:  24/24; Current research shows that an AM-PAC score of 17 or less is typically not associated with a discharge to the patient's home setting, whereas a score of 18 or greater is typically associated with a discharge to the patient's home setting. Pain:  Pain level pre-treatment: 6/10   Pain level post-treatment: 6/10  Pain Location: Low back  Pain Intervention(s): Medication (see MAR); Rest, Ice, Repositioning   Response to intervention: Nurse notified, See doc flow    Activity Tolerance:   Good  Please refer to the flowsheet for vital signs taken during this treatment. After treatment:   []         Patient left in no apparent distress sitting up in chair  [x]         Patient left in no apparent distress in bed  [x]         Call bell left within reach  [x]         Nursing notified  []         Caregiver present  []         Bed alarm activated  []         SCDs applied    COMMUNICATION/EDUCATION:   [x]         Role of Physical Therapy in the acute care setting. [x]         Fall prevention education was provided and the patient/caregiver indicated understanding. [x]         Patient/family have participated as able in goal setting and plan of care. [x]         Patient/family agree to work toward stated goals and plan of care. []         Patient understands intent and goals of therapy, but is neutral about his/her participation. []         Patient is unable to participate in goal setting/plan of care: ongoing with therapy staff.  []         Other:     Thank you for this referral.  Sam Maher, PT, DPT   Time Calculation: 18 mins

## 2022-09-02 NOTE — PROGRESS NOTES
0700:  Bedside shift change report given to LIBBY Davalos, RN (oncoming nurse) by Cortes Youssef. Jourdan Bone, YOBANY (offgoing nurse). Report included the following information SBAR, Kardex, Intake/Output, MAR, Recent Results, and Cardiac Rhythm NSR .     0800:  Initial assessment complete, see flowsheet. Patient is c/o 3/10 sharp back pain and accepted repositioning and PRN Tylenol, see MAR. Upon reassessment she states relief from pain and rated it 0/10 after interventions. She is A&OX4, set up for breakfast and is tolerating her diet well. MD rounded and placed an order to consult PT and if cleared, potential discharge. 1000:  PT is at the bedside to work with the patient. 1200:  Patient is requesting another order of broth for lunch in addition to the one she received, appetite appears to be improving. She has no c/o pain or other needs at this time. 1446:  Patient is up to the bedside commode with standby assist for one time observation of mobility after working with PT, no issue. CBWR for when patient finishes. 1645:  Patient up ad dom to bedside commode, see flowsheet for output. No other needs expressed at this time. [id number] [phone] [id number]:  Patient is on the phone, requested that the phone be plugged into the . She has stated no other needs at this time. 1900:  Bedside shift change report given to NAV Le (oncoming nurse) by Jaki Viveros, YOBANY (offgoing nurse). Report included the following information SBAR, Kardex, Intake/Output, MAR, Recent Results, and Cardiac Rhythm NSR .

## 2022-09-02 NOTE — PROGRESS NOTES
1904  Bedside shift change report given to NAV HaywoodRN (oncoming nurse) by Paco Peters RN (offgoing nurse). Report included the following information SBAR, Kardex, Intake/Output, MAR, Recent Results, Med Rec Status, and Cardiac Rhythm Sinus Tach . 1930  Pt resting in bed aaox3, talking on cell phone. Skin w/d. Resp easy and nonlabored. Sat's 99% on room air. NSR. Pt voices no c/o pain or discomfort. CBWR.     2127  HS med given. Pt up to bsc to void. 0000 Pt resting quietly with eyes closed. Resp easy and nonlabored. No distress noted. CBWR.     0400  Hourly rounding complete at this time. Pt. Resting quietly with call bell in reach. VSS.     0600  Pt sitting on bsc taking her bath.    0700  Bedside shift report given to oncoming nurse.

## 2022-09-02 NOTE — H&P
History and Physical    Subjective:     David Hu is a 78 y.o. female  has a past medical history of Allergic rhinitis, Arthritis, Asthma, Chronic obstructive pulmonary disease ([address] GERD (gastroesophageal reflux disease), Hypertension, Thromboembolus ([address] and Thyroid disease. Patient seen at bedside in emergency department. Patient came to the emergency room with right low back pain and leg pain states it come on or after a long car ride. Patient was worked up in the emergency room abdominal CT showed diverticulosis diverticulitis and also elevated troponin from her labs. Patient did not have any chest pain at any time. Also from a patient complained of left lower quadrant abdominal pain and states that her stool has been a little mucousy with little streaks of blood for the last 24 hours. No nausea vomiting no fevers reported. Patient was given Levaquin and Flagyl in the emergency room for diverticulitis. Patient be admitted to observation telemetry troponin will be continued cardiology consult echocardiogram will be ordered patient has not had 1 and has not seen a cardiologist in over a year. GI consultation I will allow patient to have clear liquids and she will be continued on Flagyl and Levaquin for diverticulitis. Lipase is slightly elevated no evidence of pancreatitis on the CT scan. Past Medical History:   Diagnosis Date    Allergic rhinitis     Arthritis     knee - pending surg    Asthma     Chronic obstructive pulmonary disease (HCC)     mild per pulmonary notes    GERD (gastroesophageal reflux disease)     Hypertension     Thromboembolus (HCC)     many years ago    Thyroid disease     cyst on thyroid      Past Surgical History:   Procedure Laterality Date    HX APPENDECTOMY  age 23    HX HYSTERECTOMY  age 35    HX KNEE REPLACEMENT Left 2011    Dr. Shiva Farley Left age 23    inguinal     History reviewed. No pertinent family history.    Social History Tobacco Use    Smoking status: Former     Packs/day: 1.00     Types: Cigarettes    Smokeless tobacco: Never    Tobacco comments:     smoke for appox 18 months at age 23   Substance Use Topics    Alcohol use: Yes     Comment: rarely       Prior to Admission medications    Medication Sig Start Date End Date Taking? Authorizing Provider   carvediloL (COREG) 25 mg tablet Take 1 Tablet by mouth two (2) times daily (with meals). Yes Other, MD Kathy   fluticasone propionate (FLONASE) 50 mcg/actuation nasal spray 1 Spray daily. Yes Other, MD Kathy   fluticasone propion-salmeteroL (ADVAIR/WIXELA) 250-50 mcg/dose diskus inhaler Take 1 Puff by inhalation every twelve (12) hours. Yes Other, MD Kathy   amLODIPine (NORVASC) 5 mg tablet TAKE 1 TABLET BY MOUTH DAILY FOR BLOOD PRESSURE   Yes Other, MD Kathy   aspirin 81 mg chewable tablet 1 tablet   Yes Other, MD Kathy   albuterol (PROVENTIL VENTOLIN) 2.5 mg /3 mL (0.083 %) nebu Take 2.5 mg by inhalation. Yes Other, MD Kathy   fluticasone propionate (FLONASE) 50 mcg/actuation nasal spray 2 sprays by Both Nostrils route nightly. Yes Provider, Historical     Allergies   Allergen Reactions    Ace Inhibitors Other (comments)     Not sure, was a long time ago    Ceclor [Cefaclor] Hives and Itching    Cephalosporins Rash and Itching    Ketek [Telithromycin] Hives and Itching         Review of Systems   Constitutional:  Negative for fever and malaise/fatigue. Respiratory:  Negative for shortness of breath. Cardiovascular:  Negative for chest pain and leg swelling. Gastrointestinal:  Positive for blood in stool. Negative for constipation, nausea and vomiting. Genitourinary:  Negative for dysuria. Musculoskeletal:  Positive for back pain and myalgias. Negative for falls. Neurological:  Negative for dizziness. All other systems reviewed and are negative.        Objective:   VITALS:    Visit Vitals  BP (!) 154/84   Pulse 65   Temp 98.6 °F (37 °C)   Resp 20   Ht 5' 2\" (1.575 m)   Wt 76.7 kg (169 lb)   SpO2 99%   BMI 30.91 kg/m²       Physical Exam  Vitals and nursing note reviewed. Constitutional:       Appearance: She is well-developed. She is ill-appearing. HENT:      Head: Normocephalic and atraumatic. Eyes:      Conjunctiva/sclera: Conjunctivae normal.      Pupils: Pupils are equal, round, and reactive to light. Cardiovascular:      Rate and Rhythm: Normal rate and regular rhythm. Pulses: Normal pulses. Heart sounds: Normal heart sounds. Pulmonary:      Effort: Pulmonary effort is normal. No respiratory distress. Breath sounds: Normal breath sounds. No stridor. No wheezing or rhonchi. Abdominal:      General: Bowel sounds are normal. There is no distension. Palpations: Abdomen is soft. Tenderness: There is abdominal tenderness. Musculoskeletal:         General: Normal range of motion. Cervical back: Normal range of motion and neck supple. Skin:     General: Skin is warm and dry. Neurological:      Mental Status: She is alert and oriented to person, place, and time. Psychiatric:         Behavior: Behavior normal.         Thought Content:  Thought content normal.         Judgment: Judgment normal.       _______________________________________________________________________  Care Plan discussed with:    Comments   Patient X    Family      RN X    Care Manager                    Consultant:      _______________________________________________________________________  Expected  Disposition:   Home with Family X   HH/PT/OT/RN    SNF/LTC    [facility]    ________________________________________________________________________  TOTAL TIME:  48 Minutes    Critical Care Provided     Minutes non procedure based      Comments    X Reviewed previous records   >50% of visit spent in counseling and coordination of care X Discussion with patient and/or family and questions answered ________________________________________________________________________    Labs:  Recent Results (from the past 24 hour(s))   URINALYSIS W/ RFLX MICROSCOPIC    Collection Time: 09/01/22  5:00 PM   Result Value Ref Range    Color Dark Yellow      Appearance Clear      Specific gravity 1.016 1.005 - 1.030      pH (UA) 5.5 5.0 - 8.0      Protein 30 (A) Negative mg/dL    Glucose Negative Negative mg/dL    Ketone Trace (A) Negative mg/dL    Bilirubin Negative Negative      Blood Negative Negative      Urobilinogen 1.0 0.2 - 1.0 EU/dL    Nitrites Negative Negative      Leukocyte Esterase Trace (A) Negative     URINE MICROSCOPIC    Collection Time: 09/01/22  5:00 PM   Result Value Ref Range    WBC 0-4 0 - 4 /hpf    RBC 0-5 0 - 2 /hpf    Epithelial cells Few 0 - 20 /lpf    Bacteria 2+ (A) None /hpf    Hyaline cast 2-5 /lpf   CBC WITH AUTOMATED DIFF    Collection Time: 09/01/22 10:47 PM   Result Value Ref Range    WBC 10.5 4.6 - 13.2 K/uL    RBC 4.80 4.20 - 5.30 M/uL    HGB 14.6 12.0 - 16.0 g/dL    HCT 44.8 35.0 - 45.0 %    MCV 93.3 78.0 - 100.0 FL    MCH 30.4 24.0 - 34.0 PG    MCHC 32.6 31.0 - 37.0 g/dL    RDW 13.2 11.6 - 14.5 %    PLATELET 183 004 - 284 K/uL    MPV 10.4 9.2 - 11.8 FL    NRBC 0.0 0.0  WBC    ABSOLUTE NRBC 0.00 0.00 - 0.01 K/uL    NEUTROPHILS 65 40 - 73 %    LYMPHOCYTES 18 (L) 21 - 52 %    MONOCYTES 11 (H) 3 - 10 %    EOSINOPHILS 4 0 - 5 %    BASOPHILS 1 0 - 2 %    IMMATURE GRANULOCYTES 1 (H) 0 - 0.5 %    ABS. NEUTROPHILS 6.9 1.8 - 8.0 K/UL    ABS. LYMPHOCYTES 1.9 0.9 - 3.6 K/UL    ABS. MONOCYTES 1.1 0.05 - 1.2 K/UL    ABS. EOSINOPHILS 0.4 0.0 - 0.4 K/UL    ABS. BASOPHILS 0.1 0.0 - 0.1 K/UL    ABS. IMM.  GRANS. 0.1 (H) 0.00 - 0.04 K/UL    DF AUTOMATED     TROPONIN-HIGH SENSITIVITY    Collection Time: 09/01/22 10:47 PM   Result Value Ref Range    Troponin-High Sensitivity 68 (H) 0 - 54 ng/L   METABOLIC PANEL, BASIC    Collection Time: 09/01/22 11:10 PM   Result Value Ref Range    Sodium 135 (L) 136 - 145 mmol/L    Potassium 3.7 3.5 - 5.5 mmol/L    Chloride 102 100 - 111 mmol/L    CO2 25 21 - 32 mmol/L    Anion gap 8 3.0 - 18.0 mmol/L    Glucose 98 74 - 99 mg/dL    BUN 10 7 - 18 mg/dL    Creatinine 1.03 0.60 - 1.30 mg/dL    BUN/Creatinine ratio 10 (L) 12 - 20      GFR est AA >60 >60 ml/min/1.73m2    GFR est non-AA 51 (L) >60 ml/min/1.73m2    Calcium 9.5 8.5 - 10.1 mg/dL   LIPASE    Collection Time: 09/01/22 11:10 PM   Result Value Ref Range    Lipase 605 (H) 73 - 393 U/L   HEPATIC FUNCTION PANEL    Collection Time: 09/01/22 11:10 PM   Result Value Ref Range    Protein, total 8.1 6.4 - 8.2 g/dL    Albumin 3.1 (L) 3.4 - 5.0 g/dL    Globulin 5.0 (H) 2.0 - 4.0 g/dL    A-G Ratio 0.6 (L) 0.8 - 1.7      Bilirubin, total 0.6 0.2 - 1.0 mg/dL    Bilirubin, direct 0.2 0.0 - 0.2 mg/dL    Alk. phosphatase 76 45 - 117 U/L    AST (SGOT) 10 10 - 38 U/L    ALT (SGPT) 10 (L) 13 - 56 U/L   TROPONIN-HIGH SENSITIVITY    Collection Time: 09/02/22 12:30 AM   Result Value Ref Range    Troponin-High Sensitivity 66 (H) 0 - 54 ng/L       Imaging:  CT CHEST ABD PELV WO CONT    Result Date: 9/1/2022  EXAM: CT of the abdomen and pelvis CLINICAL INDICATION/HISTORY: pain and cough   > Additional: None. COMPARISON: January 10, 2022   > Reference Exam: None. TECHNIQUE: Axial CT imaging of the abdomen and pelvis was performed without intravenous contrast. Multiplanar reformats were generated. One or more dose reduction techniques were used on this CT: automated exposure control, adjustment of the mAs and/or kVp according to patient size, and iterative reconstruction techniques. The specific techniques used on this CT exam have been documented in the patient's electronic medical record.   Digital Imaging and Communications in Medicine (DICOM) format image data are available to nonaffiliated external healthcare facilities or entities on a secure, media free, reciprocally searchable basis with patient authorization for at least a 12-month period after this study. _______________ FINDINGS: LUNGS: No suspicious nodule or mass. Scarring and bronchiectasis in the lung bases. PLEURA: Normal. AIRWAY: Central airways are patent. MEDIASTINUM: Normal heart size. Trace pericardial effusion. Great vessels are unremarkable. LYMPH NODES: No enlarged lymph nodes. OTHER: Degenerative changes noted in the shoulders, more so on the right. LIVER, BILIARY: Liver is normal. No biliary dilation. Gallbladder is unremarkable. PANCREAS: Normal. SPLEEN: Normal. ADRENALS: Normal. KIDNEYS/URETERS/BLADDER: Small right renal cyst are noted largest measuring up to 6.0 x 5.8 cm. LYMPH NODES: No enlarged lymph nodes. GASTROINTESTINAL TRACT: No bowel dilation or wall thickening. Scattered clonic diverticulosis. There is questionable subtle pericolonic fat stranding in the proximal sigmoid colon seen on axial series images 95-99 which could represent early changes of acute diverticulitis. Mild mural thickening of the more distal sigmoid colon is also noted on images 121-124 but without significant inflammatory changes, potentially related to underdistention. No discrete mass identified. PELVIC ORGANS: Unremarkable. VASCULATURE: Unremarkable. BONES: No acute or aggressive osseous abnormalities identified. L2 level spondylosis including grade 1 anterolisthesis of L4-L5 and L5 on S1 with associated facet arthropathy. OTHER: No ascites. _______________     1. Extensive colonic diverticulosis with suggestion of subtle inflammatory changes in the proximal sigmoid concerning for acute diverticulitis. 2. Additional segment of questionable wall thickening involving the more distal sigmoid colon perhaps related to underdistention. Differential would include additional site of mild inflammatory change however, underlying mass lesion not entirely excluded. GI consultation is advised. 3. Trace pericardial effusion.  Otherwise, no acute findings in the chest. 4. Additional chronic/ancillary findings as above.       Assessment & Plan:       Diverticulitis  Left lower quadrant pain and tenderness  Mucousy stools blood-streaked  GI consult  CT scan shows diverticulitis diverticulosis  Flagyl and Levaquin to continue  Clear liquid diet    Elevated troponin  Troponin slightly elevated x2  No chest pain or shortness of breath reported  Cardiology consult  Repeat troponin    Sciatica  Back pain sciatica  Tylenol as needed for pain  No abnormalities found on CT scan  Likely related to osteoarthritis    Hypertension  This is a chronic problem  Continue Haim Brandon  Cardiology consult    Chronic obstructive pulmonary disease ([facility] [place] [address]  This is a chronic problem  Continue Advair, Proventil        Code Status: Full      Prophylaxis: Lovenox      Electronically Signed : Kayy Messer ENP-C, FNP-C, [address] voice recognition was used to generate this report, which may have resulted in some phonetic based errors in grammar and contents.  Even though attempts were made to correct all the mistakes, some may have been missed, and remained in the body of the document

## 2022-09-02 NOTE — PROGRESS NOTES
Problem: Falls - Risk of  Goal: *Absence of Falls  Description: Document [place] Fall Risk and appropriate interventions in the flowsheet. Outcome: Progressing Towards Goal  Note: Fall Risk Interventions:  Mobility Interventions: Utilize walker, cane, or other assistive device, PT Consult for mobility concerns, PT Consult for assist device competence         Medication Interventions: Teach patient to arise slowly, Patient to call before getting OOB    Elimination Interventions: Call light in reach, Patient to call for help with toileting needs              Problem: Patient Education: Go to Patient Education Activity  Goal: Patient/Family Education  Outcome: Progressing Towards Goal     Problem: Pressure Injury - Risk of  Goal: *Prevention of pressure injury  Description: Document Charli Scale and appropriate interventions in the flowsheet.   Outcome: Progressing Towards Goal  Note: Pressure Injury Interventions:  Sensory Interventions: Assess changes in LOC, Discuss PT/OT consult with provider, Keep linens dry and wrinkle-free, Minimize linen layers    Moisture Interventions: Minimize layers, Absorbent underpads    Activity Interventions: Increase time out of bed, PT/OT evaluation    Mobility Interventions: HOB 30 degrees or less, PT/OT evaluation    Nutrition Interventions: Document food/fluid/supplement intake, Offer support with meals,snacks and hydration                     Problem: Patient Education: Go to Patient Education Activity  Goal: Patient/Family Education  Outcome: Progressing Towards Goal     Problem: Pain  Goal: *Control of Pain  Outcome: Progressing Towards Goal  Goal: *PALLIATIVE CARE:  Alleviation of Pain  Outcome: Progressing Towards Goal     Problem: Patient Education: Go to Patient Education Activity  Goal: Patient/Family Education  Outcome: Progressing Towards Goal     Problem: General Medical Care Plan  Goal: *Vital signs within specified parameters  Outcome: Progressing Towards Goal  Goal: *Labs within defined limits  Outcome: Progressing Towards Goal  Goal: *Absence of infection signs and symptoms  Outcome: Progressing Towards Goal  Goal: *Optimal pain control at patient's stated goal  Outcome: Progressing Towards Goal  Goal: *Skin integrity maintained  Outcome: Progressing Towards Goal  Goal: *Fluid volume balance  Outcome: Progressing Towards Goal  Goal: *Optimize nutritional status  Outcome: Progressing Towards Goal  Goal: *Anxiety reduced or absent  Outcome: Progressing Towards Goal  Goal: *Progressive mobility and function (eg: ADL's)  Outcome: Progressing Towards Goal     Problem: Patient Education: Go to Patient Education Activity  Goal: Patient/Family Education  Outcome: Progressing Towards Goal

## 2022-09-02 NOTE — PROGRESS NOTES
TRANSFER - IN REPORT:    Verbal report received from NAV Espino LPN(name) on Joann  being received from ED(unit) for routine progression of care      Report consisted of patients Situation, Background, Assessment and   Recommendations(SBAR). Information from the following report(s) SBAR, Kardex, ED Summary, Intake/Output, MAR, Recent Results, and Med Rec Status was reviewed with the receiving nurse. Opportunity for questions and clarification was provided. Assessment completed upon patients arrival to unit and care assumed. 0244  Received pt to ICU room #2 as med-surgical overflow pt. Pt aaox3. Skin w/d. Resp easy and nonlabored. Sat's 99% on room air. Pt ambulated from stretcher to bsc to void then to bed. Pt voices no complaints. CBWR.     0330  Pt resting quietly with eyes closed. Resp easy and nonlabored. No distress noted. CBWR.     0500  Hourly rounding complete at this time. Pt. Resting quietly with call bell in reach. VSS. Blood pressure (!) 142/85, pulse 77, temperature 98.7 °F (37.1 °C), resp. rate 16, height 5' 2\" (1.575 m), weight 73 kg (161 lb), SpO2 97 %.    0700  Bedside shift report given to oncoming nurse.

## 2022-09-02 NOTE — ROUTINE PROCESS
TRANSFER - OUT REPORT:    Verbal report given to Chelsea Capps RN on Nelson  being transferred to ICU for routine progression of care       Report consisted of patients Situation, Background, Assessment and   Recommendations(SBAR). Information from the following report(s) ED Summary was reviewed with the receiving nurse. Lines:   Peripheral IV 09/01/22 Anterior;Right Forearm (Active)        Opportunity for questions and clarification was provided.       Patient transported with:   Monitor

## 2022-09-02 NOTE — PROGRESS NOTES
Comprehensive Nutrition Assessment    Type and Reason for Visit: Initial    Nutrition Recommendations/Plan:   Clear liquids diet recommend when able to advance advance as tolerated to a Cardiac diet     Malnutrition Assessment:  Malnutrition Status: At risk for malnutrition (specify) (clear liquids diet for diverticulitis) (09/02/22 1445)    Context:  Acute illness     Findings of the 6 clinical characteristics of malnutrition:   Energy Intake:  No significant decrease in energy intake  Weight Loss:  No significant weight loss     Body Fat Loss:  No significant body fat loss,     Muscle Mass Loss:  No significant muscle mass loss,    Fluid Accumulation:  No significant fluid accumulation,     Strength:  Not performed     Nutrition Assessment:    77 yo female PMH: Arthritis, asthma, COPD, GERD, HTN, thromboembolus, Thyroid disease    Nutrition Related Findings:    Overweight BMI 29.5 at 161 lbs. Pt complains of sciatica with Right low back and leg pain after a long car ride. Also complains of LLQ pain with mucosy stool and streaks of blood. CT of abdomen shows diverticlosis and Diverticulitis currenty on clear liquids diet with Flagyl and Levaquin pending GI consult.  Wound Type: None    Recent Results (from the past 24 hour(s))   URINALYSIS W/ RFLX MICROSCOPIC    Collection Time: 09/01/22  5:00 PM   Result Value Ref Range    Color Dark Yellow      Appearance Clear      Specific gravity 1.016 1.005 - 1.030      pH (UA) 5.5 5.0 - 8.0      Protein 30 (A) Negative mg/dL    Glucose Negative Negative mg/dL    Ketone Trace (A) Negative mg/dL    Bilirubin Negative Negative      Blood Negative Negative      Urobilinogen 1.0 0.2 - 1.0 EU/dL    Nitrites Negative Negative      Leukocyte Esterase Trace (A) Negative     URINE MICROSCOPIC    Collection Time: 09/01/22  5:00 PM   Result Value Ref Range    WBC 0-4 0 - 4 /hpf    RBC 0-5 0 - 2 /hpf    Epithelial cells Few 0 - 20 /lpf    Bacteria 2+ (A) None /hpf    Hyaline cast 2-5 /lpf   CBC WITH AUTOMATED DIFF    Collection Time: 09/01/22 10:47 PM   Result Value Ref Range    WBC 10.5 4.6 - 13.2 K/uL    RBC 4.80 4.20 - 5.30 M/uL    HGB 14.6 12.0 - 16.0 g/dL    HCT 44.8 35.0 - 45.0 %    MCV 93.3 78.0 - 100.0 FL    MCH 30.4 24.0 - 34.0 PG    MCHC 32.6 31.0 - 37.0 g/dL    RDW 13.2 11.6 - 14.5 %    PLATELET 554 163 - 643 K/uL    MPV 10.4 9.2 - 11.8 FL    NRBC 0.0 0.0  WBC    ABSOLUTE NRBC 0.00 0.00 - 0.01 K/uL    NEUTROPHILS 65 40 - 73 %    LYMPHOCYTES 18 (L) 21 - 52 %    MONOCYTES 11 (H) 3 - 10 %    EOSINOPHILS 4 0 - 5 %    BASOPHILS 1 0 - 2 %    IMMATURE GRANULOCYTES 1 (H) 0 - 0.5 %    ABS. NEUTROPHILS 6.9 1.8 - 8.0 K/UL    ABS. LYMPHOCYTES 1.9 0.9 - 3.6 K/UL    ABS. MONOCYTES 1.1 0.05 - 1.2 K/UL    ABS. EOSINOPHILS 0.4 0.0 - 0.4 K/UL    ABS. BASOPHILS 0.1 0.0 - 0.1 K/UL    ABS. IMM. GRANS. 0.1 (H) 0.00 - 0.04 K/UL    DF AUTOMATED     TROPONIN-HIGH SENSITIVITY    Collection Time: 09/01/22 10:47 PM   Result Value Ref Range    Troponin-High Sensitivity 68 (H) 0 - 54 ng/L   METABOLIC PANEL, BASIC    Collection Time: 09/01/22 11:10 PM   Result Value Ref Range    Sodium 135 (L) 136 - 145 mmol/L    Potassium 3.7 3.5 - 5.5 mmol/L    Chloride 102 100 - 111 mmol/L    CO2 25 21 - 32 mmol/L    Anion gap 8 3.0 - 18.0 mmol/L    Glucose 98 74 - 99 mg/dL    BUN 10 7 - 18 mg/dL    Creatinine 1.03 0.60 - 1.30 mg/dL    BUN/Creatinine ratio 10 (L) 12 - 20      GFR est AA >60 >60 ml/min/1.73m2    GFR est non-AA 51 (L) >60 ml/min/1.73m2    Calcium 9.5 8.5 - 10.1 mg/dL   LIPASE    Collection Time: 09/01/22 11:10 PM   Result Value Ref Range    Lipase 605 (H) 73 - 393 U/L   HEPATIC FUNCTION PANEL    Collection Time: 09/01/22 11:10 PM   Result Value Ref Range    Protein, total 8.1 6.4 - 8.2 g/dL    Albumin 3.1 (L) 3.4 - 5.0 g/dL    Globulin 5.0 (H) 2.0 - 4.0 g/dL    A-G Ratio 0.6 (L) 0.8 - 1.7      Bilirubin, total 0.6 0.2 - 1.0 mg/dL    Bilirubin, direct 0.2 0.0 - 0.2 mg/dL    Alk.  phosphatase 76 45 - 117 U/L AST (SGOT) 10 10 - 38 U/L    ALT (SGPT) 10 (L) 13 - 56 U/L   EKG, 12 LEAD, INITIAL    Collection Time: 09/01/22 11:50 PM   Result Value Ref Range    Ventricular Rate 56 BPM    Atrial Rate 110 BPM    P-R Interval 218 ms    QRS Duration 91 ms    Q-T Interval 457 ms    QTC Calculation (Bezet) 442 ms    Calculated P Axis 30 degrees    Calculated R Axis 13 degrees    Calculated T Axis 29 degrees    Diagnosis       Sinus rhythm  Atrial premature complex  Borderline prolonged W[place] interval  LVH with secondary repolarization abnormality    Confirmed by PIERCE Burch ([id number]) on 9/2/2022 1:46:32 PM     TROPONIN-HIGH SENSITIVITY    Collection Time: 09/02/22 12:30 AM   Result Value Ref Range    Troponin-High Sensitivity 66 (H) 0 - 54 ng/L   METABOLIC PANEL, BASIC    Collection Time: 09/02/22  4:00 AM   Result Value Ref Range    Sodium 136 136 - 145 mmol/L    Potassium 3.5 3.5 - 5.5 mmol/L    Chloride 103 100 - 111 mmol/L    CO2 28 21 - 32 mmol/L    Anion gap 5 3.0 - 18.0 mmol/L    Glucose 98 74 - 99 mg/dL    BUN 10 7 - 18 mg/dL    Creatinine 0.89 0.60 - 1.30 mg/dL    BUN/Creatinine ratio 11 (L) 12 - 20      GFR est AA >60 >60 ml/min/1.73m2    GFR est non-AA >60 >60 ml/min/1.73m2    Calcium 9.4 8.5 - 10.1 mg/dL   MAGNESIUM    Collection Time: 09/02/22  4:00 AM   Result Value Ref Range    Magnesium 2.2 1.6 - 2.6 mg/dL   CBC WITH AUTOMATED DIFF    Collection Time: 09/02/22  4:00 AM   Result Value Ref Range    WBC 8.7 4.6 - 13.2 K/uL    RBC 4.47 4.20 - 5.30 M/uL    HGB 13.3 12.0 - 16.0 g/dL    HCT 42.3 35.0 - 45.0 %    MCV 94.6 78.0 - 100.0 FL    MCH 29.8 24.0 - 34.0 PG    MCHC 31.4 31.0 - 37.0 g/dL    RDW 13.2 11.6 - 14.5 %    PLATELET 027 818 - 375 K/uL    MPV 10.0 9.2 - 11.8 FL    NRBC 0.0 0.0  WBC    ABSOLUTE NRBC 0.00 0.00 - 0.01 K/uL    NEUTROPHILS 62 40 - 73 %    LYMPHOCYTES 19 (L) 21 - 52 %    MONOCYTES 13 (H) 3 - 10 %    EOSINOPHILS 4 0 - 5 %    BASOPHILS 1 0 - 2 %    IMMATURE GRANULOCYTES 1 (H) 0 - 0.5 %    ABS. NEUTROPHILS 5.4 1.8 - 8.0 K/UL    ABS. LYMPHOCYTES 1.7 0.9 - 3.6 K/UL    ABS. MONOCYTES 1.1 0.05 - 1.2 K/UL    ABS. EOSINOPHILS 0.4 0.0 - 0.4 K/UL    ABS. BASOPHILS 0.1 0.0 - 0.1 K/UL    ABS. IMM.  GRANS. 0.0 0.00 - 0.04 K/UL    DF AUTOMATED     TROPONIN-HIGH SENSITIVITY    Collection Time: 09/02/22  4:00 AM   Result Value Ref Range    Troponin-High Sensitivity 61 (H) 0 - 54 ng/L   ECHO ADULT COMPLETE    Collection Time: 09/02/22  8:23 AM   Result Value Ref Range    LV EDV A2C 88 mL    LV EDV A4C 85 mL    LV ESV A2C 63 mL    LV ESV A4C 53 mL    IVSd 1.3 (A) 0.6 - 0.9 cm    LVIDd 4.9 3.9 - 5.3 cm    LVIDs 4.3 cm    LVOT Diameter 2.1 cm    LVOT Mean Gradient 2 mmHg    LVOT VTI 21.4 cm    LVOT Peak Velocity 1.0 m/s    LVOT Peak Gradient 4 mmHg    LVPWd 1.4 (A) 0.6 - 0.9 cm    LV E' Lateral Velocity 9 cm/s    LV E' Septal Velocity 6 cm/s    LV Ejection Fraction A2C 29 %    LV Ejection Fraction A4C 37 %    EF BP 34 (A) 55 - 100 %    LVOT Area 3.5 cm2    LVOT SV 74.1 ml    LA Minor Axis 6.5 cm    LA Major Axis 5.6 cm    LA Area 2C 29.6 cm2    LA Area 4C 21.9 cm2    LA Volume BP 94 (A) 22 - 52 mL    LA Diameter 3.9 cm    RA Area 4C 15.5 cm2    RA Volume 42 ml    Est. RA Pressure 3 mmHg    AV Mean Gradient 4 mmHg    AV VTI 25.7 cm    AV Mean Velocity 0.9 m/s    AV Peak Velocity 1.3 m/s    AV Peak Gradient 7 mmHg    AV Area by VTI 2.9 cm2    AV Area by Peak Velocity 2.6 cm2    Aortic Root 3.4 cm    Ascending Aorta 3.3 cm    IVC Proxmal 1.4 cm    MV Mean Gradient 2 mmHg    MV VTI 19.3 cm    MV Mean Velocity 0.6 m/s    MV Max Velocity 0.9 m/s    MV Peak Gradient 3 mmHg    MV Area by VTI 3.8 cm2    PV Max Velocity 1.1 m/s    PV Peak Gradient 4 mmHg    RV Basal Dimension 3.5 cm    RV Longitudinal Dimension 5.7 cm    RV Mid Dimension 2.5 cm    TAPSE 1.8 1.7 cm    TR Max Velocity 2.38 m/s    TR Peak Gradient 23 mmHg    Fractional Shortening 2D 12 28 - 44 %    LV ESV Index A4C 30 mL/m2    LV EDV Index A4C 49 mL/m2    LV ESV Index A2C 36 mL/m2    LV EDV Index A2C 51 mL/m2    LVIDd Index 2.82 cm/m2    LVIDs Index 2.47 cm/m2    LV RWT Ratio 0.57     LV Mass 2D 267.9 (A) 67 - 162 g    LV Mass 2D Index 154.0 (A) 43 - 95 g/m2    LA Volume Index BP 54 (A) 16 - 34 ml/m2    LVOT Stroke Volume Index 42.6 mL/m2    LA Size Index 2.24 cm/m2    LA/AO Root Ratio 1.15     RA Volume Index A4C 24 mL/m2    Ao Root Index 1.95 cm/m2    Ascending Aorta Index 1.90 cm/m2    AV Velocity Ratio 0.77     LVOT:AV VTI Index 0.83     YRIS/BSA VTI 1.7 cm2/m2    YRIS/BSA Peak Velocity 1.5 cm2/m2    MV:LVOT VTI Index 0.90     RVSP 26 mmHg        Current Nutrition Intake & Therapies:  Average Meal Intake: 1-25%  Average Supplement Intake: None ordered  ADULT DIET Clear Liquid    Anthropometric Measures:  Height: 5' 2\" (157.5 cm)  Ideal Body Weight (IBW): 110 lbs (50 kg)  Admission Body Weight: 161 lb  Current Body Wt:  73 kg (161 lb), 146.4 % IBW. Bed scale  Current BMI (kg/m2): 29.4        Weight Adjustment: No adjustment                 BMI Category: Overweight (BMI 25.0-29. 9)    Estimated Daily Nutrient Needs:  Energy Requirements Based On: Kcal/kg (20-25 kcal/kg)  Weight Used for Energy Requirements: Admission (73 kg)  Energy (kcal/day): 7317-3045 kcal/day  Weight Used for Protein Requirements: Admission (0.8-1 g/kg)  Protein (g/day): 58-73 g/day  Method Used for Fluid Requirements: 1 ml/kcal  Fluid (ml/day): 3596-9202 mL/day    Nutrition Diagnosis:   Predicted inadequate energy intake related to altered GI function as evidenced by NPO or clear liquid status due to medical condition, GI abnormality    Nutrition Interventions:   Food and/or Nutrient Delivery: Continue current diet  Nutrition Education/Counseling: Education not indicated  Coordination of Nutrition Care: Continue to monitor while inpatient       Goals:     Goals: PO intake 75% or greater, by next RD assessment       Nutrition Monitoring and Evaluation:      Food/Nutrient Intake Outcomes: Diet advancement/tolerance, Food and nutrient intake  Physical Signs/Symptoms Outcomes: Meal time behavior, Weight, Nutrition focused physical findings, GI status    Follow up 9/4/2022    Discharge Planning:     Too soon to determine    [address]: SERGIO [phone]

## 2022-09-02 NOTE — ASSESSMENT & PLAN NOTE
Back pain sciatica  Tylenol as needed for pain  No abnormalities found on CT scan  Likely related to osteoarthritis

## 2022-09-02 NOTE — ASSESSMENT & PLAN NOTE
Troponin slightly elevated x2  No chest pain or shortness of breath reported  Cardiology consult  Repeat troponin

## 2022-09-02 NOTE — ED PROVIDER NOTES
Pt c/o b/l low back pain, radiating down left leg.pain worsens and radiation to leg occur w movement jacob bending. Mild pain when still. No abd pain. No cp or upper back pain. No.  Says mild loose mucous stool yest w fever to 99. Denies fever or diarrhea today. +mild prod cough x few days also. No urinary changes. Took tylenol about 6 hours pta w no change. Says pain started after car ride x one hr, hadn't ridden that long in awhile, got out and felt low back pain. H/o sim pain in past w sciatica but not for few  yrs. No rash or injury. No urinary or bowel changes. Past Medical History:   Diagnosis Date    Allergic rhinitis     Arthritis     knee - pending surg    Asthma     Chronic obstructive pulmonary disease (HCC)     mild per pulmonary notes    GERD (gastroesophageal reflux disease)     Hypertension     Thromboembolus (HCC)     many years ago    Thyroid disease     cyst on thyroid       Past Surgical History:   Procedure Laterality Date    HX APPENDECTOMY  age 23    HX HYSTERECTOMY  age 35    HX KNEE REPLACEMENT Left 2011    Dr. Mitra Choe Left age 23    inguinal         History reviewed. No pertinent family history.     Social History     Socioeconomic History    Marital status:      Spouse name: Not on file    Number of children: Not on file    Years of education: Not on file    Highest education level: Not on file   Occupational History    Not on file   Tobacco Use    Smoking status: Former     Packs/day: 1.00     Types: Cigarettes    Smokeless tobacco: Never    Tobacco comments:     smoke for appox 18 months at age 23   Substance and Sexual Activity    Alcohol use: Yes     Comment: rarely    Drug use: No    Sexual activity: Not Currently   Other Topics Concern    Not on file   Social History Narrative    Not on file     Social Determinants of Health     Financial Resource Strain: Not on file   Food Insecurity: Not on file   Transportation Needs: Not on file Physical Activity: Not on file   Stress: Not on file   Social Connections: Not on file   Intimate Partner Violence: Not on file   Housing Stability: Not on file         ALLERGIES: Ace inhibitors, Ceclor [cefaclor], Cephalosporins, and Ketek [telithromycin]    Review of Systems   Constitutional:  Negative for chills and fatigue. HENT:  Negative for congestion. Respiratory:  Positive for cough. Negative for shortness of breath. Cardiovascular:  Negative for chest pain. Gastrointestinal:  Negative for abdominal pain and vomiting. Musculoskeletal:  Positive for back pain. Skin:  Negative for rash and wound. Neurological:  Negative for weakness, light-headedness and numbness. All other systems reviewed and are negative. Vitals:    09/01/22 2233 09/01/22 2325 09/02/22 0010 09/02/22 0104   BP: (!) 162/75 (!) 159/75 (!) 162/70 (!) 154/84   Pulse: 68 (!) 54 63 65   Resp: 19 19 19 20   Temp:       SpO2: 97% 98% 99% 99%   Weight:       Height:                Physical Exam  Vitals and nursing note reviewed. Constitutional:       Appearance: She is well-developed. She is not diaphoretic. HENT:      Head: Normocephalic and atraumatic. Eyes:      Pupils: Pupils are equal, round, and reactive to light. Cardiovascular:      Rate and Rhythm: Normal rate and regular rhythm. Heart sounds: No murmur heard. Pulmonary:      Effort: Pulmonary effort is normal.      Breath sounds: No wheezing. Abdominal:      Palpations: Abdomen is soft. Tenderness: There is no abdominal tenderness. Musculoskeletal:         General: Tenderness present. No deformity. Cervical back: Normal range of motion. Right lower leg: No edema. Left lower leg: No edema. Comments: + b/l paralumbar ttp/spasm. No swelling/erythema. No leg ttp From  + SLR at 45 deg on left. No upper back ttp. No abd ttp   Skin:     General: Skin is dry. Capillary Refill: Capillary refill takes less than 2 seconds. Findings: No rash. Neurological:      Mental Status: She is alert and oriented to person, place, and time. Sensory: No sensory deficit. Motor: No weakness.    Psychiatric:         Mood and Affect: Mood normal.        MDM         Procedures    Vitals:  Patient Vitals for the past 12 hrs:   Temp Pulse Resp BP SpO2   09/02/22 0104 -- 65 20 (!) 154/84 99 %   09/02/22 0010 -- 63 19 (!) 162/70 99 %   09/01/22 2325 -- (!) 54 19 (!) 159/75 98 %   09/01/22 2233 -- 68 19 (!) 162/75 97 %   09/01/22 2010 -- 77 18 (!) 176/97 97 %   09/01/22 1649 98.6 °F (37 °C) 82 16 (!) 154/87 96 %         Medications ordered:   Medications   acetaminophen-codeine (TYLENOL #3) per tablet 1 Tablet (1 Tablet Oral Given 9/1/22 2033)   cyclobenzaprine (FLEXERIL) tablet 10 mg (10 mg Oral Given 9/1/22 2033)   levoFLOXacin (LEVAQUIN) tablet 500 mg (500 mg Oral Given 9/1/22 2255)   metroNIDAZOLE (FLAGYL) tablet 500 mg (500 mg Oral Given 9/1/22 2255)         Lab findings:  Recent Results (from the past 12 hour(s))   URINALYSIS W/ RFLX MICROSCOPIC    Collection Time: 09/01/22  5:00 PM   Result Value Ref Range    Color Dark Yellow      Appearance Clear      Specific gravity 1.016 1.005 - 1.030      pH (UA) 5.5 5.0 - 8.0      Protein 30 (A) Negative mg/dL    Glucose Negative Negative mg/dL    Ketone Trace (A) Negative mg/dL    Bilirubin Negative Negative      Blood Negative Negative      Urobilinogen 1.0 0.2 - 1.0 EU/dL    Nitrites Negative Negative      Leukocyte Esterase Trace (A) Negative     URINE MICROSCOPIC    Collection Time: 09/01/22  5:00 PM   Result Value Ref Range    WBC 0-4 0 - 4 /hpf    RBC 0-5 0 - 2 /hpf    Epithelial cells Few 0 - 20 /lpf    Bacteria 2+ (A) None /hpf    Hyaline cast 2-5 /lpf   CBC WITH AUTOMATED DIFF    Collection Time: 09/01/22 10:47 PM   Result Value Ref Range    WBC 10.5 4.6 - 13.2 K/uL    RBC 4.80 4.20 - 5.30 M/uL    HGB 14.6 12.0 - 16.0 g/dL    HCT 44.8 35.0 - 45.0 %    MCV 93.3 78.0 - 100.0 FL    MCH 30.4 24.0 - 34.0 PG    MCHC 32.6 31.0 - 37.0 g/dL    RDW 13.2 11.6 - 14.5 %    PLATELET 658 867 - 550 K/uL    MPV 10.4 9.2 - 11.8 FL    NRBC 0.0 0.0  WBC    ABSOLUTE NRBC 0.00 0.00 - 0.01 K/uL    NEUTROPHILS 65 40 - 73 %    LYMPHOCYTES 18 (L) 21 - 52 %    MONOCYTES 11 (H) 3 - 10 %    EOSINOPHILS 4 0 - 5 %    BASOPHILS 1 0 - 2 %    IMMATURE GRANULOCYTES 1 (H) 0 - 0.5 %    ABS. NEUTROPHILS 6.9 1.8 - 8.0 K/UL    ABS. LYMPHOCYTES 1.9 0.9 - 3.6 K/UL    ABS. MONOCYTES 1.1 0.05 - 1.2 K/UL    ABS. EOSINOPHILS 0.4 0.0 - 0.4 K/UL    ABS. BASOPHILS 0.1 0.0 - 0.1 K/UL    ABS. IMM. GRANS. 0.1 (H) 0.00 - 0.04 K/UL    DF AUTOMATED     TROPONIN-HIGH SENSITIVITY    Collection Time: 09/01/22 10:47 PM   Result Value Ref Range    Troponin-High Sensitivity 68 (H) 0 - 54 ng/L   METABOLIC PANEL, BASIC    Collection Time: 09/01/22 11:10 PM   Result Value Ref Range    Sodium 135 (L) 136 - 145 mmol/L    Potassium 3.7 3.5 - 5.5 mmol/L    Chloride 102 100 - 111 mmol/L    CO2 25 21 - 32 mmol/L    Anion gap 8 3.0 - 18.0 mmol/L    Glucose 98 74 - 99 mg/dL    BUN 10 7 - 18 mg/dL    Creatinine 1.03 0.60 - 1.30 mg/dL    BUN/Creatinine ratio 10 (L) 12 - 20      GFR est AA >60 >60 ml/min/1.73m2    GFR est non-AA 51 (L) >60 ml/min/1.73m2    Calcium 9.5 8.5 - 10.1 mg/dL   LIPASE    Collection Time: 09/01/22 11:10 PM   Result Value Ref Range    Lipase 605 (H) 73 - 393 U/L   HEPATIC FUNCTION PANEL    Collection Time: 09/01/22 11:10 PM   Result Value Ref Range    Protein, total 8.1 6.4 - 8.2 g/dL    Albumin 3.1 (L) 3.4 - 5.0 g/dL    Globulin 5.0 (H) 2.0 - 4.0 g/dL    A-G Ratio 0.6 (L) 0.8 - 1.7      Bilirubin, total 0.6 0.2 - 1.0 mg/dL    Bilirubin, direct 0.2 0.0 - 0.2 mg/dL    Alk.  phosphatase 76 45 - 117 U/L    AST (SGOT) 10 10 - 38 U/L    ALT (SGPT) 10 (L) 13 - 56 U/L   TROPONIN-HIGH SENSITIVITY    Collection Time: 09/02/22 12:30 AM   Result Value Ref Range    Troponin-High Sensitivity 66 (H) 0 - 54 ng/L           X-Ray, CT or other radiology findings or impressions:  CT CHEST ABD PELV WO CONT   Final Result      1. Extensive colonic diverticulosis with suggestion of subtle inflammatory   changes in the proximal sigmoid concerning for acute diverticulitis. 2. Additional segment of questionable wall thickening involving the more distal   sigmoid colon perhaps related to underdistention. Differential would include   additional site of mild inflammatory change however, underlying mass lesion not   entirely excluded. GI consultation is advised. 3. Trace pericardial effusion. Otherwise, no acute findings in the chest.   4. Additional chronic/ancillary findings as above. Progress notes, Consult notes or additional Procedure notes:   10:22 PM findings noted, d/w pt, says long h/o diverticulosis, +mucous stools. Says back feels much better. 12:01 AM pt feels better, no cp, no abd pain, soft, non tender, pt says no etoh use. D/w NP Radha Dexter, to admit      Diagnosis:   1. Sciatica, unspecified laterality    2. Acute upper respiratory infection    3. Hypertension, unspecified type    4. Diverticulitis    5. Diverticulosis    6. Acute pancreatitis, unspecified complication status, unspecified pancreatitis type    7. Elevated troponin        Disposition: home    Follow-up Information    None          Patient's Medications   Start Taking    No medications on file   Continue Taking    ALBUTEROL (PROVENTIL VENTOLIN) 2.5 MG /3 ML (0.083 %) NEBU    Take 2.5 mg by inhalation. AMLODIPINE (NORVASC) 5 MG TABLET    TAKE 1 TABLET BY MOUTH DAILY FOR BLOOD PRESSURE    ASPIRIN 81 MG CHEWABLE TABLET    1 tablet    CARVEDILOL (COREG) 25 MG TABLET    Take 1 Tablet by mouth two (2) times daily (with meals). FLUTICASONE PROPION-SALMETEROL (ADVAIR/WIXELA) 250-50 MCG/DOSE DISKUS INHALER    Take 1 Puff by inhalation every twelve (12) hours. FLUTICASONE PROPIONATE (FLONASE) 50 MCG/ACTUATION NASAL SPRAY    2 sprays by Both Nostrils route nightly. FLUTICASONE PROPIONATE (FLONASE) 50 MCG/ACTUATION NASAL SPRAY    1 [place] daily. These Medications have changed    No medications on file   Stop Taking    ALBUTEROL (PROVENTIL HFA, VENTOLIN HFA, PROAIR HFA) 90 MCG/ACTUATION INHALER    Take 1-2 Puffs by inhalation every four (4) hours as needed for Wheezing. ALPRAZOLAM (XANAX) 0.5 MG TABLET    Take 0.5 mg by mouth two (2) times daily as needed for Anxiety. ASPIRIN, BUFFERED 81 MG TAB    Take  by mouth. B COMPLEX-VITAMIN C-FOLIC ACID 0.8 MG (NEPHRO-FLEX) 0.8 MG TAB TABLET    Take 1 tablet by mouth two (2) times a day. FAMOTIDINE (PEPCID) 40 MG TABLET    Take 40 mg by mouth daily. LOSARTAN (COZAAR) 50 MG TABLET    Take 50 mg by mouth daily. MOMETASONE-FORMOTEROL (DULERA) 200-5 MCG/ACTUATION HFA INHALER    Take 2 puffs by inhalation two (2) times a day. VERAPAMIL (CALAN) 120 MG TABLET    Take 120 mg by mouth daily.

## 2022-09-02 NOTE — ASSESSMENT & PLAN NOTE
Left lower quadrant pain and tenderness  Mucousy stools blood-streaked  GI consult  CT scan shows diverticulitis diverticulosis  Flagyl and Levaquin to continue  Clear liquid diet

## 2022-09-03 PROBLEM — I21.4 NSTEMI (NON-ST ELEVATED MYOCARDIAL INFARCTION) (HCC): Status: ACTIVE | Noted: 2022-09-03

## 2022-09-03 PROCEDURE — 74011250637 HC RX REV CODE- 250/637: Performed by: INTERNAL MEDICINE

## 2022-09-03 PROCEDURE — 94640 AIRWAY INHALATION TREATMENT: CPT

## 2022-09-03 PROCEDURE — 96372 THER/PROPH/DIAG INJ SC/IM: CPT

## 2022-09-03 PROCEDURE — 74011250636 HC RX REV CODE- 250/636: Performed by: NURSE PRACTITIONER

## 2022-09-03 PROCEDURE — G0378 HOSPITAL OBSERVATION PER HR: HCPCS

## 2022-09-03 PROCEDURE — 65270000029 HC RM PRIVATE

## 2022-09-03 PROCEDURE — 74011000250 HC RX REV CODE- 250: Performed by: NURSE PRACTITIONER

## 2022-09-03 PROCEDURE — 74011250637 HC RX REV CODE- 250/637: Performed by: NURSE PRACTITIONER

## 2022-09-03 PROCEDURE — 94761 N-INVAS EAR/PLS OXIMETRY MLT: CPT

## 2022-09-03 RX ORDER — LOSARTAN POTASSIUM 25 MG/1
25 TABLET ORAL DAILY
Status: DISCONTINUED | OUTPATIENT
Start: 2022-09-03 | End: 2022-09-04 | Stop reason: HOSPADM

## 2022-09-03 RX ADMIN — SODIUM CHLORIDE, PRESERVATIVE FREE 10 ML: 5 INJECTION INTRAVENOUS at 05:59

## 2022-09-03 RX ADMIN — CARVEDILOL 25 MG: 12.5 TABLET, FILM COATED ORAL at 08:11

## 2022-09-03 RX ADMIN — SODIUM CHLORIDE, PRESERVATIVE FREE 10 ML: 5 INJECTION INTRAVENOUS at 21:52

## 2022-09-03 RX ADMIN — LEVETIRACETAM 500 MG: 250 TABLET, FILM COATED ORAL at 18:16

## 2022-09-03 RX ADMIN — SODIUM CHLORIDE, PRESERVATIVE FREE 10 ML: 5 INJECTION INTRAVENOUS at 14:49

## 2022-09-03 RX ADMIN — MOMETASONE FUROATE AND FORMOTEROL FUMARATE DIHYDRATE 1 PUFF: 200; 5 AEROSOL RESPIRATORY (INHALATION) at 08:17

## 2022-09-03 RX ADMIN — FLUTICASONE PROPIONATE 2 SPRAY: 50 SPRAY, METERED NASAL at 21:28

## 2022-09-03 RX ADMIN — LEVETIRACETAM 500 MG: 250 TABLET, FILM COATED ORAL at 08:11

## 2022-09-03 RX ADMIN — METRONIDAZOLE 500 MG: 250 TABLET ORAL at 21:28

## 2022-09-03 RX ADMIN — CARVEDILOL 25 MG: 12.5 TABLET, FILM COATED ORAL at 16:22

## 2022-09-03 RX ADMIN — LOSARTAN POTASSIUM 25 MG: 25 TABLET, FILM COATED ORAL at 12:09

## 2022-09-03 RX ADMIN — ASPIRIN 81 MG: 81 TABLET, CHEWABLE ORAL at 08:12

## 2022-09-03 RX ADMIN — ENOXAPARIN SODIUM 40 MG: 100 INJECTION SUBCUTANEOUS at 09:10

## 2022-09-03 RX ADMIN — METRONIDAZOLE 500 MG: 250 TABLET ORAL at 08:11

## 2022-09-03 RX ADMIN — MOMETASONE FUROATE AND FORMOTEROL FUMARATE DIHYDRATE 1 PUFF: 200; 5 AEROSOL RESPIRATORY (INHALATION) at 19:39

## 2022-09-03 RX ADMIN — AMLODIPINE BESYLATE 5 MG: 5 TABLET ORAL at 08:12

## 2022-09-03 NOTE — PROGRESS NOTES
1850- Bedside shift change report given to Yanick Alston RN (oncoming nurse) by Jonathan Thao RN (offgoing nurse). Report included the following information SBAR, Kardex, ED Summary, Intake/Output, MAR, Accordion, Recent Results, and Cardiac Rhythm SR depressed ST. Received patient resting in bed, watching tv. Respirations even and unlabored, no evidence of distress or discomfort. Bed in lowest and locked position, CBWR. VSS     1920- This RN to bedside for assessment. Patient A+Ox4, denies pain or discomfort at this time. 2045- Patient up to bathroom, no assistance required. 2115- Patient requesting snack at this time, but unsure of diet. This RN educated patient on diet orders. Jello and fruit provided at this time. 2128- Scheduled medications given without difficulty. Patient clarifying orders for pills to help prevent seizures. Education provided at this time. Opportunity was given at this time for questions to which this RN answered appropriately. 2340- Reassessment completed at this time. No changes noted, respirations remain even and unlabored, no evidence of distress. VSS.     0130- Patient continues to rest in bed, no evidence of distress or discomfort. VSS    0315- This RN to bedside, reassessment completed. No changes noted, patient continues to sleep in bed. VSS    0645- Bedside shift change report given to Wellington Hatfield (oncoming nurse) by Yanick Alston RN (offgoing nurse).  Report included the following information SBAR, Kardex, ED Summary, Intake/Output, MAR, Accordion, Recent Results, and Cardiac Rhythm SR w/ 1AVB depressed ST .

## 2022-09-03 NOTE — PROGRESS NOTES
Care Management Interventions  PCP Verified by CM: Yes  Last Visit to PCP: 06/09/22  Palliative Care Criteria Met (RRAT>21 & CHF Dx)?: No  Transition of Care Consult (CM Consult): Discharge Planning  Physical Therapy Consult: Yes  Occupational Therapy Consult: No  Speech Therapy Consult: No  Support Systems: Child(cal)  Confirm Follow Up Transport: Family  The Plan for Transition of Care is Related to the Following Treatment Goals : Patient centered discharge planning to ensure smooth transition to community and PLOF. Discharge Location  Patient Expects to be Discharged to[de-identified] Home with outpatient services    Reason for Admission:   Chart reviewed and pt interviewed. Pt presented to ED with complaints of back pain and leg pain after going on a long car ride. CT shows diverticulitis and troponin elevated. Hospitalist consulted and placed in OBS with cardio and GI consults. Plan for utilizing home health:   Has personal care        PCP: First and Last name:  Dr. Melinda Benjamin     Name of Practice:    Are you a current patient: Yes/No:    Approximate date of last visit:  6-9-22   Can you participate in a virtual visit with your PCP:                     Current Advanced Directive/Advance Care Plan: Full Code      Healthcare Decision Maker:   Click here to complete [address] including selection of the Healthcare Decision Maker Relationship (ie \"Primary\")             Primary Decision MakerChancy Needs - Daughter - [phone]                  Transition of Care Plan:    Plan of care includes medication reconciliation to be complete, education will be given with teach back method, and will identify need for post-acute care follow up. Patient centered discharge planning to ensure smooth transition to community and PLOF. Pt lives home by herself with personal care CNAs that come 5 days a week to provide care for pt. No DME need.   Will continue with personal care aides vs HH.

## 2022-09-03 NOTE — PROGRESS NOTES
Hospitalist Progress Note     INTERNAL MEDICINE PROGRESS NOTE  Patient: Orestes Kuhn   YOB: 1940   MRN: [id number]      Hospital course / Assessment    Principle Problems:  Diverticulitis  Left lower quadrant pain and tenderness, Mucousy stools blood-streaked  CT scan shows diverticulitis diverticulosis  Flagyl and Levaquin to continue  Clear liquid diet   Elevated troponin- Type II demand   Troponin slightly elevated x2, trending flat , EKG no ischemic changes   No chest pain or shortness of breath reported  Cardiology consulted  Combined CHF   Echo : EF 34%, grade II diastolic dysfunction, Left atrium is severely dilated.   Compensated , cont' with Coreg, switch Norvasc to losartan   Sciatica  Back pain sciatica  Tylenol as needed for pain  No abnormalities found on CT scan  Likely related to osteoarthritis  Hypertension  Continue Coreg, Norvasc  Chronic obstructive pulmonary disease ([address]  This is a chronic problem  Continue Advair, Proventil  Seizure disorder  Cont' with Keppra       Code Status: Full code   DVT prophylaxis: pharmacologic and mechanical  Today Recommendation and Plan:   Cont' with antibiotic course  Ok with advance diet  Cardiology consult  Switch Norvasc to losartan   Ok with transfer to the floor   May home in AM         Disposition    Disposition: home     Subjective / ROS:   Patient states no CP, no SOB, no fever, still having some abd pain      Medical Decision Making   Chart, Images and Lab data reviewed, necessary medical Orders placed   Discussed with nursing staff     Vitals:    22 0000 22 0400 22 0754 22 0817   BP: 119/71  139/77    Pulse: 88 81 88    Resp: 16  17    Temp: 99.4 °F (37.4 °C)  98.5 °F (36.9 °C)    SpO2: 98%   97%   Weight:       Height:         Temp (24hrs), Av.7 °F (37.1 °C), Min:98.3 °F (36.8 °C), Max:99.4 °F (37.4 °C)      Intake/Output Summary (Last 24 hours) at 9/3/2022 0930  Last data filed at 9/3/2022 0558  Gross per 24 hour   Intake 600 ml   Output 1150 ml   Net -550 ml       Physical Exam:   General Appearance:   Appears in no acute distress.,  HEENT:   Moist oral mucous membranes, conjunctiva clear,   Neck:   Supple  Lungs: No wheezes. , No rales. , Normal respiratory effort,   Heart:   Regular rate and rhythm  Abdomen:   Soft , Non-distended and Non-tender,   Extremities:   no edema of legs  Neuro:   alert, oriented, moves all extremities well    Current medications:     Current Facility-Administered Medications   Medication Dose Route Frequency    losartan (COZAAR) tablet 25 mg  25 mg Oral DAILY    albuterol (PROVENTIL VENTOLIN) nebulizer solution 2.5 mg  2.5 mg Inhalation Q6H PRN    aspirin chewable tablet 81 mg  81 mg Oral DAILY    carvediloL (COREG) tablet 25 mg  25 mg Oral BID WITH MEALS    sodium chloride (NS) flush 5-40 mL  5-40 mL IntraVENous PRN    acetaminophen (TYLENOL) tablet 650 mg  650 mg Oral Q6H PRN    Or    acetaminophen (TYLENOL) suppository 650 mg  650 mg Rectal Q6H PRN    polyethylene glycol (MIRALAX) packet 17 g  17 g Oral DAILY PRN    ondansetron (ZOFRAN ODT) tablet 4 mg  4 mg Oral Q8H PRN    Or    ondansetron (ZOFRAN) injection 4 mg  4 mg IntraVENous Q6H PRN    enoxaparin (LOVENOX) injection 40 mg  40 mg SubCUTAneous DAILY    metroNIDAZOLE (FLAGYL) tablet 500 mg  500 mg Oral Q12H    levETIRAcetam (KEPPRA) tablet 500 mg  500 mg Oral BID    fluticasone propionate (FLONASE) 50 mcg/actuation nasal spray 2 Spray  2 Spray Both Nostrils QHS    sodium chloride (NS) flush 5-40 mL  5-40 mL IntraVENous Q8H    mometasone-formoterol (DULERA) 200mcg-5mcg/puff  1 Puff Inhalation BID RT    levoFLOXacin (LEVAQUIN) tablet 500 mg  500 mg Oral Q24H          Laboratory and Radiology Data :      No results found for: FERR  WBC   Date Value Ref Range Status   09/02/2022 8.7 4.6 - 13.2 K/uL Final     No results found for: JOLENE  No results found for: UEO    Microbiology    No results found for: GMS    No results found for this or any previous visit (from the past 24 hour(s)). XR Results:  Results from Hospital Encounter encounter on 01/10/22    XR CHEST PORT    Narrative  CLINICAL HISTORY:  As above. COMPARISONS:  Chest x-ray 10/27/2021    TECHNIQUE:  single frontal view of the chest    ------------------------------------------    FINDINGS:    Lungs:  No consolidation or pleural fluid. Mild chronic appearing interstitial  lung markings. Mediastinum: Mild cardiomegaly. Atherosclerotic arterial calcification. Bones: No evidence of fracture or suspicious bone lesion.      ------------------------------------------    Impression  No acute cardiopulmonary process. CT Results:  Results from Hospital Encounter encounter on 09/01/22    CT CHEST ABD PELV WO CONT    Narrative  EXAM: CT of the abdomen and pelvis    CLINICAL INDICATION/HISTORY: pain and cough  > Additional: None. COMPARISON: January 10, 2022  > Reference Exam: None. TECHNIQUE: Axial CT imaging of the abdomen and pelvis was performed without  intravenous contrast. Multiplanar reformats were generated. One or more dose  reduction techniques were used on this CT: automated exposure control,  adjustment of the mAs and/or kVp according to patient size, and iterative  reconstruction techniques. The specific techniques used on this CT exam have  been documented in the patient's electronic medical record. Digital Imaging and  Communications in Medicine (DICOM) format image data are available to  nonaffiliated external healthcare facilities or entities on a secure, media  free, reciprocally searchable basis with patient authorization for at least a  12-month period after this study. _______________    FINDINGS:    LUNGS: No suspicious nodule or mass. Scarring and bronchiectasis in the lung  bases. PLEURA: Normal.    AIRWAY: Central airways are patent. MEDIASTINUM: Normal heart size. Trace pericardial effusion. Great vessels are  unremarkable.     LYMPH NODES: No enlarged lymph nodes. OTHER: Degenerative changes noted in the shoulders, more so on the right. LIVER, BILIARY: Liver is normal. No biliary dilation. Gallbladder is  unremarkable. PANCREAS: Normal.    SPLEEN: Normal.    ADRENALS: Normal.    KIDNEYS/URETERS/BLADDER: Small right renal cyst are noted largest measuring up  to 6.0 x 5.8 cm. LYMPH NODES: No enlarged lymph nodes. GASTROINTESTINAL TRACT: No bowel dilation or wall thickening. Scattered clonic  diverticulosis. There is questionable subtle pericolonic fat stranding in the  proximal sigmoid colon seen on axial series images 95-99 which could represent  early changes of acute diverticulitis. Mild mural thickening of the more distal  sigmoid colon is also noted on images 121-124 but without significant  inflammatory changes, potentially related to underdistention. No discrete mass  identified. PELVIC ORGANS: Unremarkable. VASCULATURE: Unremarkable. BONES: No acute or aggressive osseous abnormalities identified. L2 level  spondylosis including grade 1 anterolisthesis of L4-L5 and L5 on S1 with  associated facet arthropathy. OTHER: No ascites. _______________    Impression  1. Extensive colonic diverticulosis with suggestion of subtle inflammatory  changes in the proximal sigmoid concerning for acute diverticulitis. 2. Additional segment of questionable wall thickening involving the more distal  sigmoid colon perhaps related to underdistention. Differential would include  additional site of mild inflammatory change however, underlying mass lesion not  entirely excluded. GI consultation is advised. 3. Trace pericardial effusion. Otherwise, no acute findings in the chest.  4. Additional chronic/ancillary findings as above. MRI Results:  No results found for this or any previous visit. Nuclear Medicine Results:  No results found for this or any previous visit.       US Results:  No results found for this or any previous visit. IR Results:  No results found for this or any previous visit. VAS/US Results:  No results found for this or any previous visit. David Vásquez M.D.   [facility]

## 2022-09-03 NOTE — PROGRESS NOTES
Problem: Falls - Risk of  Goal: *Absence of Falls  Description: Document Cathleen Mccauley Fall Risk and appropriate interventions in the flowsheet. Outcome: Progressing Towards Goal  Note: Fall Risk Interventions:  Mobility Interventions: Utilize walker, cane, or other assistive device         Medication Interventions: Teach patient to arise slowly    Elimination Interventions: Call light in reach              Problem: Patient Education: Go to Patient Education Activity  Goal: Patient/Family Education  Outcome: Progressing Towards Goal     Problem: Pressure Injury - Risk of  Goal: *Prevention of pressure injury  Description: Document Charli Scale and appropriate interventions in the flowsheet.   Outcome: Progressing Towards Goal  Note: Pressure Injury Interventions:  Sensory Interventions: Minimize linen layers    Moisture Interventions: Minimize layers    Activity Interventions: Increase time out of bed    Mobility Interventions: HOB 30 degrees or less    Nutrition Interventions: Document food/fluid/supplement intake                     Problem: Patient Education: Go to Patient Education Activity  Goal: Patient/Family Education  Outcome: Progressing Towards Goal     Problem: Pain  Goal: *Control of Pain  Outcome: Progressing Towards Goal  Goal: *PALLIATIVE CARE:  Alleviation of Pain  Outcome: Progressing Towards Goal     Problem: Patient Education: Go to Patient Education Activity  Goal: Patient/Family Education  Outcome: Progressing Towards Goal     Problem: General Medical Care Plan  Goal: *Vital signs within specified parameters  Outcome: Progressing Towards Goal  Goal: *Labs within defined limits  Outcome: Progressing Towards Goal  Goal: *Absence of infection signs and symptoms  Outcome: Progressing Towards Goal  Goal: *Optimal pain control at patient's stated goal  Outcome: Progressing Towards Goal  Goal: *Skin integrity maintained  Outcome: Progressing Towards Goal  Goal: *Fluid volume balance  Outcome: Progressing Towards Goal  Goal: *Optimize nutritional status  Outcome: Progressing Towards Goal  Goal: *Anxiety reduced or absent  Outcome: Progressing Towards Goal  Goal: *Progressive mobility and function (eg: ADL's)  Outcome: Progressing Towards Goal     Problem: Patient Education: Go to Patient Education Activity  Goal: Patient/Family Education  Outcome: Progressing Towards Goal     Problem: Nutrition Deficit  Goal: *Optimize nutritional status  Outcome: Progressing Towards Goal

## 2022-09-04 VITALS
RESPIRATION RATE: 14 BRPM | DIASTOLIC BLOOD PRESSURE: 58 MMHG | TEMPERATURE: 98.1 F | OXYGEN SATURATION: 94 % | HEIGHT: 62 IN | WEIGHT: 161 LBS | HEART RATE: 83 BPM | SYSTOLIC BLOOD PRESSURE: 108 MMHG | BODY MASS INDEX: 29.63 KG/M2

## 2022-09-04 PROCEDURE — 74011250637 HC RX REV CODE- 250/637: Performed by: NURSE PRACTITIONER

## 2022-09-04 PROCEDURE — 94761 N-INVAS EAR/PLS OXIMETRY MLT: CPT

## 2022-09-04 PROCEDURE — 74011250636 HC RX REV CODE- 250/636: Performed by: NURSE PRACTITIONER

## 2022-09-04 PROCEDURE — 74011250637 HC RX REV CODE- 250/637: Performed by: INTERNAL MEDICINE

## 2022-09-04 PROCEDURE — 94640 AIRWAY INHALATION TREATMENT: CPT

## 2022-09-04 PROCEDURE — G0378 HOSPITAL OBSERVATION PER HR: HCPCS

## 2022-09-04 RX ORDER — LOSARTAN POTASSIUM 25 MG/1
25 TABLET ORAL DAILY
Qty: 30 TABLET | Refills: 3 | Status: SHIPPED | OUTPATIENT
Start: 2022-09-05

## 2022-09-04 RX ADMIN — CARVEDILOL 25 MG: 12.5 TABLET, FILM COATED ORAL at 08:55

## 2022-09-04 RX ADMIN — MOMETASONE FUROATE AND FORMOTEROL FUMARATE DIHYDRATE 1 PUFF: 200; 5 AEROSOL RESPIRATORY (INHALATION) at 08:50

## 2022-09-04 RX ADMIN — ASPIRIN 81 MG: 81 TABLET, CHEWABLE ORAL at 09:02

## 2022-09-04 RX ADMIN — LOSARTAN POTASSIUM 25 MG: 25 TABLET, FILM COATED ORAL at 09:01

## 2022-09-04 RX ADMIN — ENOXAPARIN SODIUM 40 MG: 100 INJECTION SUBCUTANEOUS at 09:03

## 2022-09-04 RX ADMIN — METRONIDAZOLE 500 MG: 250 TABLET ORAL at 08:56

## 2022-09-04 RX ADMIN — LEVETIRACETAM 500 MG: 250 TABLET, FILM COATED ORAL at 09:01

## 2022-09-04 NOTE — PROGRESS NOTES
Comprehensive Nutrition Assessment    Type and Reason for Visit: Initial    Nutrition Recommendations/Plan:   Cardiac diet     Malnutrition Assessment:  Malnutrition Status: At risk for malnutrition (specify) (clear liquids diet for diverticulitis) (09/02/22 9081)    Context:  Acute illness     Findings of the 6 clinical characteristics of malnutrition:   Energy Intake:  No significant decrease in energy intake  Weight Loss:  No significant weight loss     Body Fat Loss:  No significant body fat loss,     Muscle Mass Loss:  No significant muscle mass loss,    Fluid Accumulation:  No significant fluid accumulation,     Strength:  Not performed     Nutrition Assessment:    79 yo female PMH: Arthritis, asthma, COPD, GERD, HTN, thromboembolus, Thyroid disease    Nutrition Related Findings:    Overweight BMI 29.5 at 161 lbs. Pt complains of sciatica with Right low back and leg pain after a long car ride. Also complains of LLQ pain with mucosy stool and streaks of blood. CT of abdomen shows diverticlosis and Diverticulitis currenty on clear liquids diet with Flagyl and Levaquin pending GI consult. Wound Type: None    9/4/2022: 161 lbs diet was able to be advanced to a cardiac yesterday and tolerating % of meals. Last BM 9/3/2022. Pt is stable and already has orders for discharge today. No results found for this or any previous visit (from the past 24 hour(s)). Current Nutrition Intake & Therapies:  Average intake: %  Average Supplement Intake: None ordered  ADULT DIET Regular; Low Fat/Low Chol/High Fiber/GIANFRANCO    Anthropometric Measures:  Height: 5' 2\" (157.5 cm)  Ideal Body Weight (IBW): 110 lbs (50 kg)  Admission Body Weight: 161 lb  Current Body Wt:  73 kg (161 lb), 146.4 % IBW. Bed scale  Current BMI (kg/m2): 29.4        Weight Adjustment: No adjustment                 BMI Category: Overweight (BMI 25.0-29. 9)    Estimated Daily Nutrient Needs:  Energy Requirements Based On: Kcal/kg (20-25 kcal/kg)  Weight Used for Energy Requirements: Admission (73 kg)  Energy (kcal/day): 1236-4543 kcal/day  Weight Used for Protein Requirements: Admission (0.8-1 g/kg)  Protein (g/day): 58-73 g/day  Method Used for Fluid Requirements: 1 ml/kcal  Fluid (ml/day): 1575-0469 mL/day    Nutrition Diagnosis:   Predicted inadequate energy intake related to altered GI function as evidenced by NPO or clear liquid status due to medical condition, GI abnormality    Nutrition Interventions:   Food and/or Nutrient Delivery: Continue current diet  Nutrition Education/Counseling: Education not indicated  Coordination of Nutrition Care: Continue to monitor while inpatient       Goals:     Goals: PO intake 75% or greater, by next RD assessment       Nutrition Monitoring and Evaluation:      Food/Nutrient Intake Outcomes: Diet advancement/tolerance, Food and nutrient intake  Physical Signs/Symptoms Outcomes: Meal time behavior, Weight, Nutrition focused physical findings, GI status    Follow up 9/8/2022    Discharge Planning:     Too soon to determine    [address]: SERGIO [phone]

## 2022-09-04 NOTE — PROGRESS NOTES
Verbal and  written discharge instructions given,pt voice she understood. Awaiting her granddaughter to come take her home.

## 2022-09-04 NOTE — DISCHARGE SUMMARY
Discharge Summary       Patient ID:  Willis Flower,   78 y.o., female  1940    PCP:  Yasmin Alegria MD    Admit Date: 9/1/2022  8:13 PM  Discharge Date:  No discharge date for patient encounter. Length of stay: 1 day(s)  Code Status: Full Code  Discharging physician: Benjamin Ely MD    Chief Complaint   Patient presents with    Back Pain       Discharge Medications  Current Discharge Medication List        START taking these medications    Details   losartan (COZAAR) 25 mg tablet Take 1 Tablet by mouth daily. Qty: 30 Tablet, Refills: 3  Start date: 9/5/2022           CONTINUE these medications which have NOT CHANGED    Details   cycloSPORINE (Restasis) 0.05 % dpet Administer 1 Drop to both eyes every twelve (12) hours. doxepin (SINEquan) 10 mg capsule Take 10 mg by mouth nightly.      gabapentin (NEURONTIN) 300 mg capsule Take 300 mg by mouth three (3) times daily. montelukast (Singulair) 10 mg tablet Take 10 mg by mouth daily. carvediloL (COREG) 25 mg tablet Take 1 Tablet by mouth two (2) times daily (with meals). fluticasone propion-salmeteroL (ADVAIR/WIXELA) 250-50 mcg/dose diskus inhaler Take 1 Puff by inhalation every twelve (12) hours. aspirin 81 mg chewable tablet 1 tablet      albuterol (PROVENTIL VENTOLIN) 2.5 mg /3 mL (0.083 %) nebu Take 2.5 mg by inhalation. fluticasone propionate (FLONASE) 50 mcg/actuation nasal spray 2 sprays by Both Nostrils route nightly.            STOP taking these medications       amLODIPine (NORVASC) 5 mg tablet Comments:   Reason for Stopping:                 Reason For admission    Active Problems:    Chronic obstructive pulmonary disease (HCC) ()      Overview: mild per pulmonary notes      Hypertension ()      Sciatica (9/2/2022)      Diverticulitis (9/2/2022)      Elevated troponin (9/2/2022)      NSTEMI (non-ST elevated myocardial infarction) ([facility] (9/3/2022)       HPI on Admission (per admitting physician):   Willis Flower is a 78 y.o. female  has a past medical history of Allergic rhinitis, Arthritis, Asthma, Chronic obstructive pulmonary disease ([address] GERD (gastroesophageal reflux disease), Hypertension, Thromboembolus ([address] and Thyroid disease. Patient seen at bedside in emergency department. Patient came to the emergency room with right low back pain and leg pain states it come on or after a long car ride. Patient was worked up in the emergency room abdominal CT showed diverticulosis diverticulitis and also elevated troponin from her labs. Patient did not have any chest pain at any time. Also from a patient complained of left lower quadrant abdominal pain and states that her stool has been a little mucousy with little streaks of blood for the last 24 hours. No nausea vomiting no fevers reported. Patient was given Levaquin and Flagyl in the emergency room for diverticulitis. Patient be admitted to observation telemetry troponin will be continued cardiology consult echocardiogram will be ordered patient has not had 1 and has not seen a cardiologist in over a year. GI consultation I will allow patient to have clear liquids and she will be continued on Flagyl and Levaquin for diverticulitis. Lipase is slightly elevated no evidence of pancreatitis on the CT scan.     Hospital Course and Discharge Diagnosis   The patient admitted for the following Principal Medical Problem:   Diverticulitis  Left lower quadrant pain and tenderness, Mucousy stools blood-streaked  CT scan shows diverticulitis diverticulosis  Treated with Flagyl and Levaquin to continue  Tolerated advanced diet , diarrhea resolved  Elevated troponin- Type II demand   Troponin slightly elevated x2, trending flat , EKG no ischemic changes   No chest pain or shortness of breath reported, not fluid overloaded  Echo showed combined CHF, EF 35%, Severe global hypokinesis   Cardiology consulted   Cont' with ASA, Coreg  D/w grand daughter Mignon Colunga and she is planning to get her appointment with her cardiology soon  Combined CHF   Echo : EF 34%, grade II diastolic dysfunction, Left atrium is severely dilated. Compensated , cont' with Coreg, switch Norvasc to losartan   Sciatica  Back pain sciatica  Tylenol as needed for pain  No abnormalities found on CT scan  Likely related to osteoarthritis  Hypertension  Continue Coreg, Norvasc  Chronic obstructive pulmonary disease ([facility]  This is a chronic problem  Continue Advair, Proventil  Seizure disorder  Cont' with Keppra: :     The patient condition became clinically stable and No new complain or complication during the hospital course. The Medication changes discussed with the patient and family on the discharge    Condition at discharge   Afebrile  Ambulating  Eating, Drinking, Voiding  Stable      Physical Exam on Discharge:  Visit Vitals  BP (!) 108/58   Pulse 79   Temp 99.1 °F (37.3 °C)   Resp 20   Ht 5' 2\" (1.575 m)   Wt 73 kg (161 lb)   SpO2 96%   BMI 29.45 kg/m²       General Appearance:   Appears in no acute distress. , Sitting up.,   Skin:   Skin warm & dry, No rash, No jaundice,   Lymph: There is no lymphadenopathy,   HEENT:   PERRLA, EOMI, Moist oral mucous membranes, conjunctiva clear,   Neck:   Supple, Without masses,   Lungs:   Clear, No wheezes. , No rales. , Normal respiratory effort,   Heart:   Regular rate and rhythm, No gallop,   Abdomen:   Soft , Non-distended, Normal bowel sounds and Non-tender,   Extremities:  no edema of legs, Normal pedal and radial pulses,   Neuro:    alert, oriented, affect appropriate, speech fluent, cranial nerves intact, no focal neurological deficits and moves all extremities well    Procedures:    No discharge procedures on file.      Consultants/Treatment Team:    Treatment Team: Attending Provider: Jose Jenkins MD; Consulting Provider: Sabrina Helms MD; Utilization Review: Chyna King; Staff Nurse: Debbi Lay    Disposition:    Home    Follow Up   Ugo Rooney MD    Most Recent Labs:  No results found for this or any previous visit (from the past 24 hour(s)). Recent Labs     09/02/22  0400   BUN 10      CO2 28     Recent Labs     09/02/22  0400   WBC 8.7   RBC 4.47   HCT 42.3   MCV 94.6   MCH 29.8   MCHC 31.4   RDW 13.2       XR Results:  Results from Hospital Encounter encounter on 01/10/22    XR CHEST PORT    Narrative  CLINICAL HISTORY:  As above. COMPARISONS:  Chest x-ray 10/27/2021    TECHNIQUE:  single frontal view of the chest    ------------------------------------------    FINDINGS:    Lungs:  No consolidation or pleural fluid. Mild chronic appearing interstitial  lung markings. Mediastinum: Mild cardiomegaly. Atherosclerotic arterial calcification. Bones: No evidence of fracture or suspicious bone lesion.      ------------------------------------------    Impression  No acute cardiopulmonary process. CT Results:  Results from Hospital Encounter encounter on 09/01/22    CT CHEST ABD PELV WO CONT    Narrative  EXAM: CT of the abdomen and pelvis    CLINICAL INDICATION/HISTORY: pain and cough  > Additional: None. COMPARISON: January 10, 2022  > Reference Exam: None. TECHNIQUE: Axial CT imaging of the abdomen and pelvis was performed without  intravenous contrast. Multiplanar reformats were generated. One or more dose  reduction techniques were used on this CT: automated exposure control,  adjustment of the mAs and/or kVp according to patient size, and iterative  reconstruction techniques. The specific techniques used on this CT exam have  been documented in the patient's electronic medical record. Digital Imaging and  Communications in Medicine (DICOM) format image data are available to  nonaffiliated external healthcare facilities or entities on a secure, media  free, reciprocally searchable basis with patient authorization for at least a  12-month period after this study.       _______________    FINDINGS:    LUNGS: No suspicious nodule or mass. Scarring and bronchiectasis in the lung  bases. PLEURA: Normal.    AIRWAY: Central airways are patent. MEDIASTINUM: Normal heart size. Trace pericardial effusion. Great vessels are  unremarkable. LYMPH NODES: No enlarged lymph nodes. OTHER: Degenerative changes noted in the shoulders, more so on the right. LIVER, BILIARY: Liver is normal. No biliary dilation. Gallbladder is  unremarkable. PANCREAS: Normal.    SPLEEN: Normal.    ADRENALS: Normal.    KIDNEYS/URETERS/BLADDER: Small right renal cyst are noted largest measuring up  to 6.0 x 5.8 cm. LYMPH NODES: No enlarged lymph nodes. GASTROINTESTINAL TRACT: No bowel dilation or wall thickening. Scattered clonic  diverticulosis. There is questionable subtle pericolonic fat stranding in the  proximal sigmoid colon seen on axial series images 95-99 which could represent  early changes of acute diverticulitis. Mild mural thickening of the more distal  sigmoid colon is also noted on images 121-124 but without significant  inflammatory changes, potentially related to underdistention. No discrete mass  identified. PELVIC ORGANS: Unremarkable. VASCULATURE: Unremarkable. BONES: No acute or aggressive osseous abnormalities identified. L2 level  spondylosis including grade 1 anterolisthesis of L4-L5 and L5 on S1 with  associated facet arthropathy. OTHER: No ascites. _______________    Impression  1. Extensive colonic diverticulosis with suggestion of subtle inflammatory  changes in the proximal sigmoid concerning for acute diverticulitis. 2. Additional segment of questionable wall thickening involving the more distal  sigmoid colon perhaps related to underdistention. Differential would include  additional site of mild inflammatory change however, underlying mass lesion not  entirely excluded. GI consultation is advised. 3. Trace pericardial effusion.  Otherwise, no acute findings in the chest.  4. Additional chronic/ancillary findings as above. MRI Results:  No results found for this or any previous visit. Nuclear Medicine Results:  No results found for this or any previous visit.         Greg Krishna MD   Hospitalist

## 2022-09-04 NOTE — PROGRESS NOTES
Pt to be discharge today and to follow up with her cardiologist,pt states her granddaughter will make appt. On Tues. for the doctor.

## 2022-09-04 NOTE — PROGRESS NOTES
Problem: Falls - Risk of  Goal: *Absence of Falls  Description: Document [place] Fall Risk and appropriate interventions in the flowsheet. Outcome: Resolved/Met  Note: Fall Risk Interventions:  Mobility Interventions: Patient to call before getting OOB, Utilize walker, cane, or other assistive device, Strengthening exercises (ROM-active/passive)         Medication Interventions: Patient to call before getting OOB, Teach patient to arise slowly    Elimination Interventions: Call light in reach              Problem: Patient Education: Go to Patient Education Activity  Goal: Patient/Family Education  Outcome: Resolved/Met     Problem: Pressure Injury - Risk of  Goal: *Prevention of pressure injury  Description: Document Charli Scale and appropriate interventions in the flowsheet. Outcome: Resolved/Met  Note: Pressure Injury Interventions:  Sensory Interventions: Assess changes in LOC, Keep linens dry and wrinkle-free, Minimize linen layers, Monitor skin under medical devices    Moisture Interventions: Absorbent underpads    Activity Interventions: Increase time out of bed, Pressure redistribution bed/mattress(bed type)    Mobility Interventions: HOB 30 degrees or less, Pressure redistribution bed/mattress (bed type)    Nutrition Interventions: Document food/fluid/supplement intake, Discuss nutritional consult with provider                     Problem: Pressure Injury - Risk of  Goal: *Prevention of pressure injury  Description: Document Charli Scale and appropriate interventions in the flowsheet.   Outcome: Resolved/Met  Note: Pressure Injury Interventions:  Sensory Interventions: Assess changes in LOC, Keep linens dry and wrinkle-free, Minimize linen layers, Monitor skin under medical devices    Moisture Interventions: Absorbent underpads    Activity Interventions: Increase time out of bed, Pressure redistribution bed/mattress(bed type)    Mobility Interventions: HOB 30 degrees or less, Pressure redistribution bed/mattress (bed type)    Nutrition Interventions: Document food/fluid/supplement intake, Discuss nutritional consult with provider                     Problem: Patient Education: Go to Patient Education Activity  Goal: Patient/Family Education  Outcome: Resolved/Met     Problem: Pain  Goal: *Control of Pain  Outcome: Resolved/Met  Goal: *PALLIATIVE CARE:  Alleviation of Pain  Outcome: Resolved/Met     Problem: General Medical Care Plan  Goal: *Vital signs within specified parameters  Outcome: Resolved/Met  Goal: *Labs within defined limits  Outcome: Resolved/Met  Goal: *Absence of infection signs and symptoms  Outcome: Resolved/Met  Goal: *Optimal pain control at patient's stated goal  Outcome: Resolved/Met  Goal: *Skin integrity maintained  Outcome: Resolved/Met  Goal: *Fluid volume balance  Outcome: Resolved/Met  Goal: *Optimize nutritional status  Outcome: Resolved/Met  Goal: *Anxiety reduced or absent  Outcome: Resolved/Met  Goal: *Progressive mobility and function (eg: ADL's)  Outcome: Resolved/Met     Problem: General Medical Care Plan  Goal: *Absence of infection signs and symptoms  Outcome: Resolved/Met     Problem: General Medical Care Plan  Goal: *Skin integrity maintained  Outcome: Resolved/Met     Problem: Nutrition Deficit  Goal: *Optimize nutritional status  Outcome: Resolved/Met

## 2022-09-19 ENCOUNTER — HOSPITAL ENCOUNTER (EMERGENCY)
Age: 82
Discharge: HOME | End: 2022-09-19
Attending: INTERNAL MEDICINE
Payer: MEDICARE

## 2022-09-19 ENCOUNTER — APPOINTMENT (OUTPATIENT)
Dept: GENERAL RADIOLOGY | Age: 82
End: 2022-09-19
Attending: INTERNAL MEDICINE
Payer: MEDICARE

## 2022-09-19 VITALS
HEART RATE: 85 BPM | BODY MASS INDEX: 28.89 KG/M2 | SYSTOLIC BLOOD PRESSURE: 130 MMHG | TEMPERATURE: 99.1 F | DIASTOLIC BLOOD PRESSURE: 73 MMHG | WEIGHT: 157 LBS | OXYGEN SATURATION: 94 % | HEIGHT: 62 IN | RESPIRATION RATE: 18 BRPM

## 2022-09-19 DIAGNOSIS — M47.816 ARTHRITIS OF LUMBAR SPINE: ICD-10-CM

## 2022-09-19 DIAGNOSIS — M47.812 CERVICAL ARTHRITIS: Primary | ICD-10-CM

## 2022-09-19 PROCEDURE — 74011250637 HC RX REV CODE- 250/637: Performed by: INTERNAL MEDICINE

## 2022-09-19 PROCEDURE — 72110 X-RAY EXAM L-2 SPINE 4/>VWS: CPT

## 2022-09-19 PROCEDURE — 72050 X-RAY EXAM NECK SPINE 4/5VWS: CPT

## 2022-09-19 PROCEDURE — 99283 EMERGENCY DEPT VISIT LOW MDM: CPT

## 2022-09-19 RX ORDER — SPIRONOLACTONE 25 MG/1
12.5 TABLET ORAL DAILY
COMMUNITY
Start: 2022-09-09

## 2022-09-19 RX ORDER — AMOXICILLIN AND CLAVULANATE POTASSIUM 875; 125 MG/1; MG/1
TABLET, FILM COATED ORAL EVERY 12 HOURS
COMMUNITY

## 2022-09-19 RX ORDER — ACETAMINOPHEN 325 MG/1
325 TABLET ORAL
COMMUNITY

## 2022-09-19 RX ORDER — ACETAMINOPHEN 500 MG
1000 TABLET ORAL ONCE
Status: COMPLETED | OUTPATIENT
Start: 2022-09-19 | End: 2022-09-19

## 2022-09-19 RX ORDER — TRAMADOL HYDROCHLORIDE 50 MG/1
50 TABLET ORAL
COMMUNITY

## 2022-09-19 RX ORDER — TRIAMCINOLONE ACETONIDE 1 MG/G
CREAM TOPICAL
COMMUNITY
Start: 2022-07-11

## 2022-09-19 RX ADMIN — ACETAMINOPHEN 1000 MG: 500 TABLET ORAL at 13:13

## 2022-09-19 NOTE — PROGRESS NOTES
Received call from the ER for patient needing Home Health. Patient lives alone and has been staying with her daughter for the last week. She has a cane, rollator and a wheeled walker. She receives [facility]- Fri from 6 AM to 2 PM. Her granddaughter Michael Landers [phone] is her care giver. She has chosen [address] [place] [address] as her [facility] agency. Will make them aware.

## 2022-09-19 NOTE — ED PROVIDER NOTES
EMERGENCY DEPARTMENT HISTORY AND PHYSICAL EXAM      Date: 9/19/2022  Patient Name: Sam Hardy    History of Presenting Illness     Chief Complaint   Patient presents with    Neck Pain    Back Pain       History Provided By: Patient and family; Author [place]    HPI: Sam Hardy, 78 y.o. female  with h/o arthritis; asthma; COPD; GERD; HTN; thromboembolus; thyroid disease that was admitted 9/1/22 for  diverticulitis and right lower back pain and leg pain after a long car ride. Had elevated trop from demand. Combinced CHF EF 34%; seizure disorder. Family states that ever since the patient has been home, she has been having pain in her neck and lower back and that it is difficult for her to walk and do her ADLs. She feels that it may be due to gas because she has a lot of gas. There are no other complaints, changes, or physical findings at this time. PCP: Annette Ramos MD    Current Outpatient Medications   Medication Sig Dispense Refill    spironolactone (ALDACTONE) 25 mg tablet Take 12.5 mg by mouth daily. traMADoL (ULTRAM) 50 mg tablet Take 50 mg by mouth every six (6) hours as needed for Pain.      amoxicillin-clavulanate (Augmentin) 875-125 mg per tablet Take  by mouth every twelve (12) hours. losartan (COZAAR) 25 mg tablet Take 1 Tablet by mouth daily. 30 Tablet 3    cycloSPORINE (RESTASIS) 0.05 % dpet Administer 1 Drop to both eyes every twelve (12) hours. carvediloL (COREG) 25 mg tablet Take 1 Tablet by mouth two (2) times daily (with meals). fluticasone propion-salmeteroL (ADVAIR/WIXELA) 250-50 mcg/dose diskus inhaler Take 1 Puff by inhalation every twelve (12) hours. aspirin 81 mg chewable tablet 1 tablet      albuterol (PROVENTIL VENTOLIN) 2.5 mg /3 mL (0.083 %) nebu Take 2.5 mg by inhalation. fluticasone propionate (FLONASE) 50 mcg/actuation nasal spray 2 sprays by Both Nostrils route nightly.       acetaminophen (TYLENOL) 325 mg tablet Take 325 mg by mouth every four (4) hours as needed. triamcinolone acetonide (KENALOG) 0.1 % topical cream       doxepin (SINEquan) 10 mg capsule Take 10 mg by mouth nightly. montelukast (Singulair) 10 mg tablet Take 10 mg by mouth daily. Past History   Past Medical History:  Past Medical History:   Diagnosis Date    Allergic rhinitis     Arthritis     knee - pending surg    Asthma     Chronic obstructive pulmonary disease (HCC)     mild per pulmonary notes    GERD (gastroesophageal reflux disease)     Hypertension     Thromboembolus (HCC)     many years ago    Thyroid disease     cyst on thyroid       Past Surgical History:  Past Surgical History:   Procedure Laterality Date    HX APPENDECTOMY  age 18    HX HYSTERECTOMY  age 35    HX KNEE REPLACEMENT Left 2011    Dr. Barbara Newell Left age 23    inguinal       Family History:  History reviewed. No pertinent family history. Social History:  Social History     Tobacco Use    Smoking status: Former     Packs/day: 1.00     Types: Cigarettes    Smokeless tobacco: Never    Tobacco comments:     smoke for appox 18 months at age 23   Substance Use Topics    Alcohol use: Not Currently     Comment: rarely    Drug use: No       Allergies: Allergies   Allergen Reactions    Ace Inhibitors Other (comments)     Not sure, was a long time ago    Ceclor [Cefaclor] Hives and Itching    Cephalosporins Rash and Itching    Ketek [Telithromycin] Hives and Itching     Review of Systems   Review of Systems   Constitutional:  Negative for chills and fever. HENT:  Negative for sore throat. Eyes:  Negative for redness. Respiratory:  Negative for cough and shortness of breath. Gastrointestinal:  Positive for abdominal pain. Negative for nausea and vomiting. Genitourinary:  Negative for dysuria and hematuria. Musculoskeletal:  Positive for arthralgias, back pain, neck pain and neck stiffness. Skin:  Negative for rash.    Psychiatric/Behavioral:  Negative for confusion. Physical Exam   Physical Exam  Vitals and nursing note reviewed. Constitutional:       Comments: Elderly female that is alert and conversant. She moans in pain whenever she is moved due to pain in her back and neck. At times when she moves her neck she gets a what appears to be stabbing pain in her neck   HENT:      Mouth/Throat:      Pharynx: Oropharynx is clear. Eyes:      Extraocular Movements: Extraocular movements intact. Cardiovascular:      Rate and Rhythm: Regular rhythm. Pulses: Normal pulses. Heart sounds: Normal heart sounds. No murmur heard. Pulmonary:      Effort: Pulmonary effort is normal. No respiratory distress. Breath sounds: Normal breath sounds. Abdominal:      General: There is no distension. Palpations: Abdomen is soft. Tenderness: There is no abdominal tenderness. Musculoskeletal:      Cervical back: Neck supple. Comments: TTP in lower back; bilat knee replacements   Neurological:      Mental Status: She is oriented to person, place, and time. Lab and Diagnostic Study Results   Labs -   No results found for this or any previous visit (from the past 12 hour(s)). Radiologic Studies -   [unfilled]  CT Results  (Last 48 hours)      None          CXR Results  (Last 48 hours)      None            Medical Decision Making and ED Course   Differential Diagnosis & Medical Decision Making Provider Note:   Chronic neck and lower back pain making it difficult for pt to care of her ADLs; will have case management see her. - I am the first and primary provider for this patient. I reviewed the vital signs, available nursing notes, past medical history, past surgical history, family history and social history. The patient's presenting problems have been discussed, and the staff are in agreement with the care plan formulated and outlined with them. I have encouraged them to ask questions as they arise throughout their visit.     Vital Signs-Reviewed the patient's vital signs. Patient Vitals for the past 12 hrs:   Temp Pulse Resp BP SpO2   09/19/22 1424 -- 85 18 130/73 94 %   09/19/22 1129 99.1 °F (37.3 °C) 83 18 (!) 148/94 99 %       EKG interpretation: (Preliminary): EKG Interpreted by me. Shows     ED Course:    Pt with arthritic neck and back pains; advised family to fu with Dr Shruthi Caruso for this. Will have home health services to help her at home. Advised to take tylenol for pain  Procedures and Critical Care         Disposition   Disposition: DISCHARGE    DISCHARGE PLAN:  1. Current Discharge Medication List        CONTINUE these medications which have NOT CHANGED    Details   spironolactone (ALDACTONE) 25 mg tablet Take 12.5 mg by mouth daily. traMADoL (ULTRAM) 50 mg tablet Take 50 mg by mouth every six (6) hours as needed for Pain.      amoxicillin-clavulanate (Augmentin) 875-125 mg per tablet Take  by mouth every twelve (12) hours. losartan (COZAAR) 25 mg tablet Take 1 Tablet by mouth daily. Qty: 30 Tablet, Refills: 3      cycloSPORINE (RESTASIS) 0.05 % dpet Administer 1 Drop to both eyes every twelve (12) hours. carvediloL (COREG) 25 mg tablet Take 1 Tablet by mouth two (2) times daily (with meals). fluticasone propion-salmeteroL (ADVAIR/WIXELA) 250-50 mcg/dose diskus inhaler Take 1 Puff by inhalation every twelve (12) hours. aspirin 81 mg chewable tablet 1 tablet      albuterol (PROVENTIL VENTOLIN) 2.5 mg /3 mL (0.083 %) nebu Take 2.5 mg by inhalation. fluticasone propionate (FLONASE) 50 mcg/actuation nasal spray 2 sprays by Both Nostrils route nightly. acetaminophen (TYLENOL) 325 mg tablet Take 325 mg by mouth every four (4) hours as needed. triamcinolone acetonide (KENALOG) 0.1 % topical cream       doxepin (SINEquan) 10 mg capsule Take 10 mg by mouth nightly. montelukast (Singulair) 10 mg tablet Take 10 mg by mouth daily.            2.   Follow-up Information       Follow up With Specialties Details Why Contact Info    Shirley Grace MD Internal Medicine Physician Schedule an appointment as soon as possible for a visit in 2 days  [address]  [place] [address]  [phone]            3. Return to ED if worse   4. Current Discharge Medication List            Diagnosis/Clinical Impression     Clinical Impression:   1. Cervical arthritis    2. Arthritis of lumbar spine        Attestations: Kristin Silva MD, am the primary clinician of record. Please note that this dictation was completed with [employer], the [employer] voice recognition software. Quite often unanticipated grammatical, syntax, homophones, and other interpretive errors are inadvertently transcribed by the computer software. Please disregard these errors. Please excuse any errors that have escaped final proofreading. Thank you.

## 2022-09-19 NOTE — ED TRIAGE NOTES
Neck soreness and back sore across the bottom on both sides.      Tramadol at home for pain  Amoxicillin for diverticulitis

## 2022-09-23 ENCOUNTER — TRANSCRIBE ORDER (OUTPATIENT)
Dept: REGISTRATION | Age: 82
End: 2022-09-23

## 2022-09-23 ENCOUNTER — HOSPITAL ENCOUNTER (OUTPATIENT)
Dept: GENERAL RADIOLOGY | Age: 82
Discharge: HOME | End: 2022-09-23
Payer: MEDICARE

## 2022-09-23 DIAGNOSIS — R06.02 SHORTNESS OF BREATH: Primary | ICD-10-CM

## 2022-09-23 DIAGNOSIS — R06.02 SHORTNESS OF BREATH: ICD-10-CM

## 2022-09-23 PROCEDURE — 71046 X-RAY EXAM CHEST 2 VIEWS: CPT

## 2022-09-29 ENCOUNTER — HOSPITAL ENCOUNTER (OUTPATIENT)
Dept: LAB | Age: 82
Discharge: HOME | End: 2022-09-29

## 2022-09-29 PROCEDURE — 99001 SPECIMEN HANDLING PT-LAB: CPT

## 2022-10-02 PROBLEM — R79.89 ELEVATED TROPONIN: Status: RESOLVED | Noted: 2022-09-02 | Resolved: 2022-10-02

## 2022-10-02 PROBLEM — R77.8 ELEVATED TROPONIN: Status: RESOLVED | Noted: 2022-09-02 | Resolved: 2022-10-02

## 2022-11-09 ENCOUNTER — HOSPITAL ENCOUNTER (INPATIENT)
Age: 82
LOS: 4 days | Discharge: ANOTHER HOSPITAL | DRG: 378 | End: 2022-11-13
Attending: INTERNAL MEDICINE | Admitting: INTERNAL MEDICINE
Payer: MEDICARE

## 2022-11-09 ENCOUNTER — APPOINTMENT (OUTPATIENT)
Dept: CT IMAGING | Age: 82
DRG: 378 | End: 2022-11-09
Attending: INTERNAL MEDICINE
Payer: MEDICARE

## 2022-11-09 DIAGNOSIS — K92.2 GASTROINTESTINAL HEMORRHAGE, UNSPECIFIED GASTROINTESTINAL HEMORRHAGE TYPE: Primary | ICD-10-CM

## 2022-11-09 LAB
ABO + RH BLD: NORMAL
ALBUMIN SERPL-MCNC: 3.2 G/DL (ref 3.4–5)
ALBUMIN/GLOB SERPL: 0.7 {RATIO} (ref 0.8–1.7)
ALP SERPL-CCNC: 97 U/L (ref 45–117)
ALT SERPL-CCNC: 14 U/L (ref 13–56)
ANION GAP SERPL CALC-SCNC: 7 MMOL/L (ref 3–18)
AST SERPL W P-5'-P-CCNC: 11 U/L (ref 10–38)
BASOPHILS # BLD: 0 K/UL (ref 0–0.1)
BASOPHILS NFR BLD: 0 % (ref 0–2)
BILIRUB SERPL-MCNC: 0.7 MG/DL (ref 0.2–1)
BLOOD GROUP ANTIBODIES SERPL: NEGATIVE
BUN SERPL-MCNC: 34 MG/DL (ref 7–18)
BUN/CREAT SERPL: 31 (ref 12–20)
CA-I BLD-MCNC: 9 MG/DL (ref 8.5–10.1)
CHLORIDE SERPL-SCNC: 105 MMOL/L (ref 100–111)
CO2 SERPL-SCNC: 26 MMOL/L (ref 21–32)
CREAT SERPL-MCNC: 1.08 MG/DL (ref 0.6–1.3)
DIFFERENTIAL METHOD BLD: ABNORMAL
EOSINOPHIL # BLD: 0.1 K/UL (ref 0–0.4)
EOSINOPHIL NFR BLD: 1 % (ref 0–5)
ERYTHROCYTE [DISTWIDTH] IN BLOOD BY AUTOMATED COUNT: 17.6 % (ref 11.6–14.5)
GLOBULIN SER CALC-MCNC: 4.9 G/DL (ref 2–4)
GLUCOSE SERPL-MCNC: 116 MG/DL (ref 74–99)
HCT VFR BLD AUTO: 33.3 % (ref 35–45)
HCT VFR BLD AUTO: 40.2 % (ref 35–45)
HGB BLD-MCNC: 10.3 G/DL (ref 12–16)
HGB BLD-MCNC: 12.5 G/DL (ref 12–16)
IMM GRANULOCYTES # BLD AUTO: 0.1 K/UL (ref 0–0.04)
IMM GRANULOCYTES NFR BLD AUTO: 1 % (ref 0–0.5)
INR PPP: 1 (ref 0.8–1.2)
LACTATE SERPL-SCNC: 1.9 MMOL/L (ref 0.4–2)
LIPASE SERPL-CCNC: 119 U/L (ref 73–393)
LYMPHOCYTES # BLD: 1.4 K/UL (ref 0.9–3.6)
LYMPHOCYTES NFR BLD: 11 % (ref 21–52)
MAGNESIUM SERPL-MCNC: 2.1 MG/DL (ref 1.6–2.6)
MCH RBC QN AUTO: 29.5 PG (ref 24–34)
MCH RBC QN AUTO: 29.6 PG (ref 24–34)
MCHC RBC AUTO-ENTMCNC: 30.9 G/DL (ref 31–37)
MCHC RBC AUTO-ENTMCNC: 31.1 G/DL (ref 31–37)
MCV RBC AUTO: 95 FL (ref 78–100)
MONOCYTES # BLD: 0.8 K/UL (ref 0.05–1.2)
MONOCYTES NFR BLD: 6 % (ref 3–10)
NEUTS SEG # BLD: 10.3 K/UL (ref 1.8–8)
NEUTS SEG NFR BLD: 81 % (ref 40–73)
NRBC # BLD: 0 K/UL (ref 0–0.01)
NRBC BLD-RTO: 0 PER 100 WBC
PLATELET # BLD AUTO: 369 K/UL (ref 135–420)
PMV BLD AUTO: 9.3 FL (ref 9.2–11.8)
POTASSIUM SERPL-SCNC: 4.1 MMOL/L (ref 3.5–5.5)
PROT SERPL-MCNC: 8.1 G/DL (ref 6.4–8.2)
PROTHROMBIN TIME: 13.9 SEC (ref 11.5–15.2)
RBC # BLD AUTO: 4.23 M/UL (ref 4.2–5.3)
SODIUM SERPL-SCNC: 138 MMOL/L (ref 136–145)
SPECIMEN EXP DATE BLD: NORMAL
TROPONIN-HIGH SENSITIVITY: 86 NG/L (ref 0–54)
WBC # BLD AUTO: 12.7 K/UL (ref 4.6–13.2)

## 2022-11-09 PROCEDURE — 83735 ASSAY OF MAGNESIUM: CPT

## 2022-11-09 PROCEDURE — C9113 INJ PANTOPRAZOLE SODIUM, VIA: HCPCS | Performed by: INTERNAL MEDICINE

## 2022-11-09 PROCEDURE — 74011000250 HC RX REV CODE- 250: Performed by: INTERNAL MEDICINE

## 2022-11-09 PROCEDURE — G0378 HOSPITAL OBSERVATION PER HR: HCPCS

## 2022-11-09 PROCEDURE — 74011250636 HC RX REV CODE- 250/636: Performed by: NURSE PRACTITIONER

## 2022-11-09 PROCEDURE — 86900 BLOOD TYPING SEROLOGIC ABO: CPT

## 2022-11-09 PROCEDURE — 85018 HEMOGLOBIN: CPT

## 2022-11-09 PROCEDURE — 99285 EMERGENCY DEPT VISIT HI MDM: CPT

## 2022-11-09 PROCEDURE — 85025 COMPLETE CBC W/AUTO DIFF WBC: CPT

## 2022-11-09 PROCEDURE — 74177 CT ABD & PELVIS W/CONTRAST: CPT

## 2022-11-09 PROCEDURE — 96375 TX/PRO/DX INJ NEW DRUG ADDON: CPT

## 2022-11-09 PROCEDURE — 84484 ASSAY OF TROPONIN QUANT: CPT

## 2022-11-09 PROCEDURE — 83605 ASSAY OF LACTIC ACID: CPT

## 2022-11-09 PROCEDURE — 74011000636 HC RX REV CODE- 636: Performed by: INTERNAL MEDICINE

## 2022-11-09 PROCEDURE — 85610 PROTHROMBIN TIME: CPT

## 2022-11-09 PROCEDURE — 96374 THER/PROPH/DIAG INJ IV PUSH: CPT

## 2022-11-09 PROCEDURE — 83690 ASSAY OF LIPASE: CPT

## 2022-11-09 PROCEDURE — 74011000258 HC RX REV CODE- 258: Performed by: INTERNAL MEDICINE

## 2022-11-09 PROCEDURE — 65610000006 HC RM INTENSIVE CARE

## 2022-11-09 PROCEDURE — 74011250636 HC RX REV CODE- 250/636: Performed by: INTERNAL MEDICINE

## 2022-11-09 PROCEDURE — 80053 COMPREHEN METABOLIC PANEL: CPT

## 2022-11-09 RX ORDER — PREDNISONE 10 MG/1
10 TABLET ORAL
COMMUNITY

## 2022-11-09 RX ORDER — ALBUTEROL SULFATE 0.83 MG/ML
2.5 SOLUTION RESPIRATORY (INHALATION)
Status: DISCONTINUED | OUTPATIENT
Start: 2022-11-09 | End: 2022-11-13 | Stop reason: HOSPADM

## 2022-11-09 RX ORDER — CARVEDILOL 12.5 MG/1
25 TABLET ORAL 2 TIMES DAILY WITH MEALS
Status: DISCONTINUED | OUTPATIENT
Start: 2022-11-10 | End: 2022-11-13 | Stop reason: HOSPADM

## 2022-11-09 RX ORDER — FLUTICASONE PROPIONATE 50 MCG
2 SPRAY, SUSPENSION (ML) NASAL
Status: DISCONTINUED | OUTPATIENT
Start: 2022-11-09 | End: 2022-11-13 | Stop reason: HOSPADM

## 2022-11-09 RX ORDER — ONDANSETRON 2 MG/ML
4 INJECTION INTRAMUSCULAR; INTRAVENOUS
Status: COMPLETED | OUTPATIENT
Start: 2022-11-09 | End: 2022-11-09

## 2022-11-09 RX ORDER — SODIUM CHLORIDE 0.9 % (FLUSH) 0.9 %
5-10 SYRINGE (ML) INJECTION
Status: COMPLETED | OUTPATIENT
Start: 2022-11-09 | End: 2022-11-09

## 2022-11-09 RX ORDER — ACETAMINOPHEN 325 MG/1
650 TABLET ORAL
Status: DISCONTINUED | OUTPATIENT
Start: 2022-11-09 | End: 2022-11-13 | Stop reason: HOSPADM

## 2022-11-09 RX ORDER — ONDANSETRON 4 MG/1
4 TABLET, ORALLY DISINTEGRATING ORAL
Status: DISCONTINUED | OUTPATIENT
Start: 2022-11-09 | End: 2022-11-13 | Stop reason: HOSPADM

## 2022-11-09 RX ORDER — SPIRONOLACTONE 25 MG/1
12.5 TABLET ORAL DAILY
Status: DISCONTINUED | OUTPATIENT
Start: 2022-11-10 | End: 2022-11-13 | Stop reason: HOSPADM

## 2022-11-09 RX ORDER — CYCLOSPORINE 0.5 MG/ML
1 EMULSION OPHTHALMIC EVERY 12 HOURS
Status: DISCONTINUED | OUTPATIENT
Start: 2022-11-10 | End: 2022-11-13 | Stop reason: HOSPADM

## 2022-11-09 RX ORDER — SODIUM CHLORIDE 9 MG/ML
50 INJECTION, SOLUTION INTRAVENOUS CONTINUOUS
Status: DISCONTINUED | OUTPATIENT
Start: 2022-11-09 | End: 2022-11-13 | Stop reason: HOSPADM

## 2022-11-09 RX ORDER — LOSARTAN POTASSIUM 25 MG/1
25 TABLET ORAL DAILY
Status: DISCONTINUED | OUTPATIENT
Start: 2022-11-10 | End: 2022-11-13 | Stop reason: HOSPADM

## 2022-11-09 RX ADMIN — Medication 10 ML: at 16:50

## 2022-11-09 RX ADMIN — SODIUM CHLORIDE 80 MG: 9 INJECTION, SOLUTION INTRAMUSCULAR; INTRAVENOUS; SUBCUTANEOUS at 15:36

## 2022-11-09 RX ADMIN — IOPAMIDOL 100 ML: 755 INJECTION, SOLUTION INTRAVENOUS at 16:50

## 2022-11-09 RX ADMIN — SODIUM CHLORIDE 500 ML: 9 INJECTION, SOLUTION INTRAVENOUS at 15:26

## 2022-11-09 RX ADMIN — SODIUM CHLORIDE 50 ML/HR: 9 INJECTION, SOLUTION INTRAVENOUS at 21:49

## 2022-11-09 RX ADMIN — ONDANSETRON 4 MG: 2 INJECTION INTRAMUSCULAR; INTRAVENOUS at 15:39

## 2022-11-09 RX ADMIN — SODIUM CHLORIDE 8 MG/HR: 900 INJECTION INTRAVENOUS at 23:43

## 2022-11-09 RX ADMIN — SODIUM CHLORIDE 8 MG/HR: 900 INJECTION INTRAVENOUS at 20:28

## 2022-11-09 NOTE — ED PROVIDER NOTES
EMERGENCY DEPARTMENT HISTORY AND PHYSICAL EXAM      Date: 11/9/2022  Patient Name: Jazzy Marie    History of Presenting Illness     Chief Complaint   Patient presents with    Abdominal Pain    Vomiting    Vomiting Blood       History Provided By: Patient    HPI: Jazzy Marie, 78 y.o. female h/o arthritis; asthma; COPD; GERD; HTN; thromboembolus; thyroid disease that was admitted 9/1/22 for  diverticulitis that states that states that she ate a ham sandwhich and ensure this am and then began spitting up brown material. Later, she drank a pepsi and then states that she began to vomit blood. She c/o bloating of the abdomen and LLQ and epigastric discomfort. States that she is no longer vomiting blood but is spitting up brown material. Has no h/o GIB. States that se took her prednisone; BP this am. She took tylenol because she felt febrile. No melena. States that she feels weak. There are no other complaints, changes, or physical findings at this time. PCP: Janes Bautista MD    Current Outpatient Medications   Medication Sig Dispense Refill    predniSONE (DELTASONE) 10 mg tablet Take 10 mg by mouth daily (with breakfast).  acetaminophen (TYLENOL) 325 mg tablet Take 325 mg by mouth every four (4) hours as needed.  spironolactone (ALDACTONE) 25 mg tablet Take 12.5 mg by mouth daily.  losartan (COZAAR) 25 mg tablet Take 1 Tablet by mouth daily. 30 Tablet 3    cycloSPORINE (RESTASIS) 0.05 % dpet Administer 1 Drop to both eyes every twelve (12) hours.  carvediloL (COREG) 25 mg tablet Take 1 Tablet by mouth two (2) times daily (with meals).  fluticasone propion-salmeteroL (ADVAIR/WIXELA) 250-50 mcg/dose diskus inhaler Take 1 Puff by inhalation every twelve (12) hours.  albuterol (PROVENTIL VENTOLIN) 2.5 mg /3 mL (0.083 %) nebu Take 2.5 mg by inhalation.  fluticasone propionate (FLONASE) 50 mcg/actuation nasal spray 2 sprays by Both Nostrils route nightly.       triamcinolone acetonide (KENALOG) 0.1 % topical cream  (Patient not taking: Reported on 11/9/2022)      traMADoL (ULTRAM) 50 mg tablet Take 50 mg by mouth every six (6) hours as needed for Pain. (Patient not taking: Reported on 11/9/2022)      amoxicillin-clavulanate (AUGMENTIN) 875-125 mg per tablet Take  by mouth every twelve (12) hours. (Patient not taking: Reported on 11/9/2022)      doxepin (SINEquan) 10 mg capsule Take 10 mg by mouth nightly. (Patient not taking: Reported on 11/9/2022)      montelukast (SINGULAIR) 10 mg tablet Take 10 mg by mouth daily. (Patient not taking: Reported on 11/9/2022)      aspirin 81 mg chewable tablet 1 tablet (Patient not taking: Reported on 11/9/2022)         Past History   Past Medical History:  Past Medical History:   Diagnosis Date    Allergic rhinitis     Arthritis     knee - pending surg    Asthma     Chronic obstructive pulmonary disease (HCC)     mild per pulmonary notes    GERD (gastroesophageal reflux disease)     Hypertension     Thromboembolus ([address]     many years ago    Thyroid disease     cyst on thyroid       Past Surgical History:  Past Surgical History:   Procedure Laterality Date    HX APPENDECTOMY  age 23   [de-identified] HYSTERECTOMY  age 35    [facility] Left 2011    Dr. Cecelia Pardo Left age 23    inguinal       Family History:  No family history on file. Social History:  Social History     Tobacco Use    Smoking status: Former     Packs/day: 1.00     Types: Cigarettes    Smokeless tobacco: Never    Tobacco comments:     smoke for appox 18 months at age 23   Substance Use Topics    Alcohol use: Not Currently     Comment: rarely    Drug use: No       Allergies:   Allergies   Allergen Reactions    Ace Inhibitors Other (comments)     Not sure, was a long time ago    Ceclor [Cefaclor] Hives and Itching    Cephalosporins Rash and Itching    Ketek [Telithromycin] Hives and Itching     Review of Systems   Review of Systems Constitutional:  Positive for fatigue. Negative for chills and unexpected weight change. HENT:  Negative for trouble swallowing. Respiratory:  Negative for cough and shortness of breath. Cardiovascular:  Negative for chest pain and palpitations. Gastrointestinal:  Positive for abdominal pain, nausea and vomiting. Negative for diarrhea. Genitourinary:  Positive for hematuria. Negative for dysuria and flank pain. Musculoskeletal:  Negative for arthralgias and myalgias. Skin:  Negative for rash. Neurological:  Negative for headaches. Psychiatric/Behavioral:  Negative for confusion. Physical Exam   Physical Exam  Vitals and nursing note reviewed. Constitutional:       General: She is not in acute distress. Appearance: She is not ill-appearing or diaphoretic. HENT:      Mouth/Throat:      Mouth: Mucous membranes are moist.   Eyes:      General: No scleral icterus. Pupils: Pupils are equal, round, and reactive to light. Cardiovascular:      Heart sounds: Normal heart sounds. No murmur heard. Pulmonary:      Effort: Pulmonary effort is normal. No respiratory distress. Breath sounds: Normal breath sounds. No wheezing. Abdominal:      General: Abdomen is protuberant. Bowel sounds are normal. There is no distension. There are no signs of injury. Palpations: Abdomen is soft. Tenderness: There is no abdominal tenderness. Skin:     General: Skin is warm and dry. Capillary Refill: Capillary refill takes less than 2 seconds. Neurological:      Mental Status: She is alert and oriented to person, place, and time.    Psychiatric:         Behavior: Behavior normal.       Lab and Diagnostic Study Results   Labs -     Recent Results (from the past 12 hour(s))   CBC WITH AUTOMATED DIFF    Collection Time: 11/09/22  2:40 PM   Result Value Ref Range    WBC 12.7 4.6 - 13.2 K/uL    RBC 4.23 4.20 - 5.30 M/uL    HGB 12.5 12.0 - 16.0 g/dL    HCT 40.2 35.0 - 45.0 %    MCV 95.0 78.0 - 100.0 FL    MCH 29.6 24.0 - 34.0 PG    MCHC 31.1 31.0 - 37.0 g/dL    RDW 17.6 (H) 11.6 - 14.5 %    PLATELET 123 935 - 217 K/uL    MPV 9.3 9.2 - 11.8 FL    NRBC 0.0 0.0  WBC    ABSOLUTE NRBC 0.00 0.00 - 0.01 K/uL    NEUTROPHILS 81 (H) 40 - 73 %    LYMPHOCYTES 11 (L) 21 - 52 %    MONOCYTES 6 3 - 10 %    EOSINOPHILS 1 0 - 5 %    BASOPHILS 0 0 - 2 %    IMMATURE GRANULOCYTES 1 (H) 0 - 0.5 %    ABS. NEUTROPHILS 10.3 (H) 1.8 - 8.0 K/UL    ABS. LYMPHOCYTES 1.4 0.9 - 3.6 K/UL    ABS. MONOCYTES 0.8 0.05 - 1.2 K/UL    ABS. EOSINOPHILS 0.1 0.0 - 0.4 K/UL    ABS. BASOPHILS 0.0 0.0 - 0.1 K/UL    ABS. IMM. GRANS. 0.1 (H) 0.00 - 0.04 K/UL    DF AUTOMATED     TYPE & SCREEN    Collection Time: 11/09/22  2:40 PM   Result Value Ref Range    Crossmatch Expiration 11/12/2022,2359     ABO/Rh(D) O Positive     Antibody screen Negative    PROTHROMBIN TIME + INR    Collection Time: 11/09/22  2:40 PM   Result Value Ref Range    Prothrombin time 13.9 11.5 - 15.2 sec    INR 1.0 0.8 - 1.2     METABOLIC PANEL, COMPREHENSIVE    Collection Time: 11/09/22  2:40 PM   Result Value Ref Range    Sodium 138 136 - 145 mmol/L    Potassium 4.1 3.5 - 5.5 mmol/L    Chloride 105 100 - 111 mmol/L    CO2 26 21 - 32 mmol/L    Anion gap 7 3.0 - 18.0 mmol/L    Glucose 116 (H) 74 - 99 mg/dL    BUN 34 (H) 7 - 18 mg/dL    Creatinine 1.08 0.60 - 1.30 mg/dL    BUN/Creatinine ratio 31 (H) 12 - 20      eGFR 51 (L) >60 ml/min/1.73m2    Calcium 9.0 8.5 - 10.1 mg/dL    Bilirubin, total 0.7 0.2 - 1.0 mg/dL    AST (SGOT) 11 10 - 38 U/L    ALT (SGPT) 14 13 - 56 U/L    Alk.  phosphatase 97 45 - 117 U/L    Protein, total 8.1 6.4 - 8.2 g/dL    Albumin 3.2 (L) 3.4 - 5.0 g/dL    Globulin 4.9 (H) 2.0 - 4.0 g/dL    A-G Ratio 0.7 (L) 0.8 - 1.7     TROPONIN-HIGH SENSITIVITY    Collection Time: 11/09/22  2:40 PM   Result Value Ref Range    Troponin-High Sensitivity 86 (H) 0 - 54 ng/L   LIPASE    Collection Time: 11/09/22  2:40 PM   Result Value Ref Range    Lipase 119 73 - 393 U/L   LACTIC ACID    Collection Time: 11/09/22  2:40 PM   Result Value Ref Range    Lactic acid 1.9 0.4 - 2.0 mmol/L   MAGNESIUM    Collection Time: 11/09/22  2:40 PM   Result Value Ref Range    Magnesium 2.1 1.6 - 2.6 mg/dL       Radiologic Studies -   [unfilled]  CT Results  (Last 48 hours)                 11/09/22 1659  CT ABD PELV W CONT Final result    Impression:          1. Mild, acute to subacute superior endplate compression fracture of L3 which   is new in correlation with the prior examination from September. 2.  Colonic diverticulosis without CT evidence of diverticulitis. 3.  Small pericardial effusion, unchanged. 4.  Additional findings, as described above and without significant interval   change. .        Narrative:  EXAM: CT of the abdomen and pelvis with contrast 11/9/2022. INDICATION: Hematemesis. COMPARISON: CT scan of the abdomen and pelvis from 9/1/2022       TECHNIQUE: Axial CT imaging of the abdomen and pelvis was performed during the   dynamic infusion of 100 cc of Isovue-370. Multiplanar reformats were generated. Dose reduction techniques:  Automated exposure control, mAs and/or kVp   reductions based on patient size, and iterative reconstruction. The specific   techniques utilized on this CT exam have been documented in the patient's   electronic medical record. Digital imaging and communications and medicine (DICOM) format image data are   available to nonaffiliated external healthcare facilities or entities on a   secure, media free, reciprocally searchable basis with patient authorization for   at least a 12 month period after this study. _______________       FINDINGS:       LIVER, BILIARY: Liver and gallbladder are unremarkable. No biliary dilation. PANCREAS: WNL. SPLEEN: WNL. ADRENALS: Unremarkable. KIDNEYS: Exophytic cysts are again noted arising from the right kidney and are   unchanged.   There is no evidence of urolithiasis or hydronephrosis. LYMPH NODES: WNL. PERITONEAL CAVITY AND GASTROINTESTINAL TRACT: No free air or free fluid. No   bowel dilation or wall thickening. Diffuse colonic diverticula are present   throughout the descending and sigmoid portions of the colon. There are no   imaging features to suggest diverticulitis. A small hiatal hernia is noted. A   normal-appearing appendix is identified emanating from the cecal tip and   extending into the right side of the pelvis. VASCULATURE: Atherosclerotic vascular disease again noted. There is a small,   densely calcified right renal artery aneurysm which is unchanged. LOWER CHEST: Small pericardial effusion is again noted. Mild bibasilar   atelectasis or scarring is present. PELVIC VISCERA: The urinary bladder is partially contracted but grossly   unremarkable. The uterus appears to be surgically absent. No abnormal   intrapelvic masses or fluid collections are present. BONES: There has been interval development of a mild superior endplate   compression fracture of L3. Linear areas of lucency are present with sclerosis   subjacent to the superior endplate. Multilevel degenerative disc and   degenerative facet disease are present with grade 1 degenerative anterolisthesis   of L4 on L5. There is associated severe central canal stenosis at the L4-5   level.        OTHER: None.   _______________                 CXR Results  (Last 48 hours)      None            Medical Decision Making and ED Course   Differential Diagnosis & Medical Decision Making Provider Note:   Differential Diagnosis: Constipation, food poisoning, ulcer disease, Gall stones, abdominal aneurysm, mesenteric ischemia, bowel obstruction, biliary disease, pancreatitis, kidney stones, PID, IBD, appendicitis, UTI, renal colic, diverticulitis, colitis, hepatitis, angina, pneumonia, reflux, perforation, ovarian torsion, testicular torsion.      - I am the first and primary provider for this patient. I reviewed the vital signs, available nursing notes, past medical history, past surgical history, family history and social history. The patient's presenting problems have been discussed, and the staff are in agreement with the care plan formulated and outlined with them. I have encouraged them to ask questions as they arise throughout their visit. Vital Signs-Reviewed the patient's vital signs. Patient Vitals for the past 12 hrs:   Temp Pulse Resp BP SpO2   11/09/22 1536 -- 93 22 135/80 98 %   11/09/22 1438 98.6 °F (37 °C) (!) 105 22 (!) 159/106 98 %       EKG interpretation: (Preliminary): EKG Interpreted by me. Shows     ED Course:    7:14 PM  Case discussed with NP Leonard; will admit for serial h/h and monitoring hemodynamically stable  Procedures and Critical Care       Disposition   Disposition: ADMIT    Diagnosis/Clinical Impression     Clinical Impression:   1. Gastrointestinal hemorrhage, unspecified gastrointestinal hemorrhage type        Attestations: Francis Hendrickson MD, am the primary clinician of record. Please note that this dictation was completed with [employer], the [employer] voice recognition software. Quite often unanticipated grammatical, syntax, homophones, and other interpretive errors are inadvertently transcribed by the computer software. Please disregard these errors. Please excuse any errors that have escaped final proofreading. Thank you.

## 2022-11-09 NOTE — Clinical Note
Patient Class[de-identified] OBSERVATION [104]   Type of Bed: Telemetry [19]   Cardiac Monitoring Required?: Yes   Reason for Observation: GI BLEED   Admitting Diagnosis: GI bleed [[id number]]   Admitting Physician: Fifi Black [[id number]]   Attending Physician: Fifi Black [[id number]]

## 2022-11-10 LAB
ANION GAP SERPL CALC-SCNC: 9 MMOL/L (ref 3–18)
BASOPHILS # BLD: 0.1 K/UL (ref 0–0.1)
BASOPHILS NFR BLD: 1 % (ref 0–2)
BUN SERPL-MCNC: 58 MG/DL (ref 7–18)
BUN/CREAT SERPL: 67 (ref 12–20)
CA-I BLD-MCNC: 8.5 MG/DL (ref 8.5–10.1)
CHLORIDE SERPL-SCNC: 109 MMOL/L (ref 100–111)
CO2 SERPL-SCNC: 24 MMOL/L (ref 21–32)
CREAT SERPL-MCNC: 0.86 MG/DL (ref 0.6–1.3)
DIFFERENTIAL METHOD BLD: ABNORMAL
EOSINOPHIL # BLD: 0.1 K/UL (ref 0–0.4)
EOSINOPHIL NFR BLD: 1 % (ref 0–5)
ERYTHROCYTE [DISTWIDTH] IN BLOOD BY AUTOMATED COUNT: 17.6 % (ref 11.6–14.5)
GLUCOSE SERPL-MCNC: 95 MG/DL (ref 74–99)
HCT VFR BLD AUTO: 28.9 % (ref 35–45)
HCT VFR BLD AUTO: 29.2 % (ref 35–45)
HCT VFR BLD AUTO: 30.8 % (ref 35–45)
HCT VFR BLD AUTO: 31.1 % (ref 35–45)
HCT VFR BLD AUTO: 31.6 % (ref 35–45)
HGB BLD-MCNC: 8.7 G/DL (ref 12–16)
HGB BLD-MCNC: 9 G/DL (ref 12–16)
HGB BLD-MCNC: 9.5 G/DL (ref 12–16)
HGB BLD-MCNC: 9.6 G/DL (ref 12–16)
HGB BLD-MCNC: 9.7 G/DL (ref 12–16)
IMM GRANULOCYTES # BLD AUTO: 0.1 K/UL (ref 0–0.04)
IMM GRANULOCYTES NFR BLD AUTO: 1 % (ref 0–0.5)
LYMPHOCYTES # BLD: 2.5 K/UL (ref 0.9–3.6)
LYMPHOCYTES NFR BLD: 24 % (ref 21–52)
MCH RBC QN AUTO: 28.9 PG (ref 24–34)
MCH RBC QN AUTO: 29.2 PG (ref 24–34)
MCH RBC QN AUTO: 29.2 PG (ref 24–34)
MCH RBC QN AUTO: 29.3 PG (ref 24–34)
MCH RBC QN AUTO: 29.6 PG (ref 24–34)
MCHC RBC AUTO-ENTMCNC: 30.1 G/DL (ref 31–37)
MCHC RBC AUTO-ENTMCNC: 30.5 G/DL (ref 31–37)
MCHC RBC AUTO-ENTMCNC: 30.7 G/DL (ref 31–37)
MCHC RBC AUTO-ENTMCNC: 30.8 G/DL (ref 31–37)
MCHC RBC AUTO-ENTMCNC: 31.2 G/DL (ref 31–37)
MCV RBC AUTO: 95.5 FL (ref 78–100)
MONOCYTES # BLD: 1.3 K/UL (ref 0.05–1.2)
MONOCYTES NFR BLD: 13 % (ref 3–10)
NEUTS SEG # BLD: 6.5 K/UL (ref 1.8–8)
NEUTS SEG NFR BLD: 60 % (ref 40–73)
NRBC # BLD: 0 K/UL (ref 0–0.01)
NRBC BLD-RTO: 0 PER 100 WBC
PLATELET # BLD AUTO: 316 K/UL (ref 135–420)
PMV BLD AUTO: 9.6 FL (ref 9.2–11.8)
POTASSIUM SERPL-SCNC: 4.1 MMOL/L (ref 3.5–5.5)
RBC # BLD AUTO: 3.31 M/UL (ref 4.2–5.3)
SODIUM SERPL-SCNC: 142 MMOL/L (ref 136–145)
WBC # BLD AUTO: 10.6 K/UL (ref 4.6–13.2)

## 2022-11-10 PROCEDURE — 74011250636 HC RX REV CODE- 250/636: Performed by: INTERNAL MEDICINE

## 2022-11-10 PROCEDURE — 94640 AIRWAY INHALATION TREATMENT: CPT

## 2022-11-10 PROCEDURE — 85018 HEMOGLOBIN: CPT

## 2022-11-10 PROCEDURE — 74011250637 HC RX REV CODE- 250/637: Performed by: NURSE PRACTITIONER

## 2022-11-10 PROCEDURE — 80048 BASIC METABOLIC PNL TOTAL CA: CPT

## 2022-11-10 PROCEDURE — 65610000006 HC RM INTENSIVE CARE

## 2022-11-10 PROCEDURE — 74011000258 HC RX REV CODE- 258: Performed by: INTERNAL MEDICINE

## 2022-11-10 PROCEDURE — C9113 INJ PANTOPRAZOLE SODIUM, VIA: HCPCS | Performed by: INTERNAL MEDICINE

## 2022-11-10 PROCEDURE — 74011250636 HC RX REV CODE- 250/636: Performed by: NURSE PRACTITIONER

## 2022-11-10 PROCEDURE — 36415 COLL VENOUS BLD VENIPUNCTURE: CPT

## 2022-11-10 PROCEDURE — 94761 N-INVAS EAR/PLS OXIMETRY MLT: CPT

## 2022-11-10 PROCEDURE — 85025 COMPLETE CBC W/AUTO DIFF WBC: CPT

## 2022-11-10 RX ADMIN — SODIUM CHLORIDE 8 MG/HR: 900 INJECTION INTRAVENOUS at 04:50

## 2022-11-10 RX ADMIN — CYCLOSPORINE 1 DROP: 0.5 EMULSION OPHTHALMIC at 09:00

## 2022-11-10 RX ADMIN — MOMETASONE FUROATE AND FORMOTEROL FUMARATE DIHYDRATE 2 PUFF: 200; 5 AEROSOL RESPIRATORY (INHALATION) at 09:50

## 2022-11-10 RX ADMIN — CARVEDILOL 25 MG: 12.5 TABLET, FILM COATED ORAL at 09:40

## 2022-11-10 RX ADMIN — MOMETASONE FUROATE AND FORMOTEROL FUMARATE DIHYDRATE 2 PUFF: 200; 5 AEROSOL RESPIRATORY (INHALATION) at 19:32

## 2022-11-10 RX ADMIN — SODIUM CHLORIDE 8 MG/HR: 900 INJECTION INTRAVENOUS at 15:44

## 2022-11-10 RX ADMIN — FLUTICASONE PROPIONATE 2 SPRAY: 50 SPRAY, METERED NASAL at 21:48

## 2022-11-10 RX ADMIN — SODIUM CHLORIDE 8 MG/HR: 900 INJECTION INTRAVENOUS at 20:37

## 2022-11-10 RX ADMIN — CARVEDILOL 25 MG: 12.5 TABLET, FILM COATED ORAL at 17:16

## 2022-11-10 RX ADMIN — ACETAMINOPHEN 650 MG: 325 TABLET ORAL at 18:41

## 2022-11-10 RX ADMIN — CYCLOSPORINE 1 DROP: 0.5 EMULSION OPHTHALMIC at 21:00

## 2022-11-10 RX ADMIN — SODIUM CHLORIDE 50 ML/HR: 9 INJECTION, SOLUTION INTRAVENOUS at 17:53

## 2022-11-10 RX ADMIN — SODIUM CHLORIDE 8 MG/HR: 900 INJECTION INTRAVENOUS at 09:59

## 2022-11-10 NOTE — PROGRESS NOTES
0700:  Bedside shift change report given to LIBBY Carnes, YOBANY (oncoming nurse) by Alize Saravia. Delmer Pak, YOBANY (offgoing nurse). Report included the following information SBAR, Kardex, Intake/Output, MAR, Recent Results, and Cardiac Rhythm Sinus Rhythm; ST Depression . 0900:  Initial assessment complete, see flowsheet. Patient is A&OX4 this morning, calm and cooperative. She has no c/o pain or s/s of distress at this time. MD rounded on patient. VSS, CBWR.    1149:  Patient has visitors at the bedside and expresses no needs at this time. CBWR.    3862:  Provider placed discharge orders for patient to [facility]. Standby assist patient to [facility] for 1x BM. Patient returned to bed without issue, CBWR.    1538:  Patient's daughter is at the bedside. Patient is resting quietly with her eyes closed at this time. Awaiting a bed at [facility] for transfer. Patient expresses no other needs at this time. 1700:  Patient's sister is at the bedside. Have not yet heard back from the transfer center regarding an open bed at [facility].    1900:  Bedside shift change report given to NAV Pressley (oncoming nurse) by Yanet Venegas RN (offgoing nurse). Report included the following information SBAR, Kardex, Intake/Output, MAR, Recent Results, and Cardiac Rhythm SR/ST Depression .

## 2022-11-10 NOTE — PROGRESS NOTES
TRANSFER - IN REPORT:    Verbal report received from Soheila Muñoz RN(name) on Goree  being received from ED(unit) for routine progression of care      Report consisted of patients Situation, Background, Assessment and   Recommendations(SBAR). Information from the following report(s) SBAR, Kardex, ED Summary, Intake/Output, MAR, Recent Results, Med Rec Status, and Cardiac Rhythm Sinus Tach  was reviewed with the receiving nurse. Opportunity for questions and clarification was provided. Assessment completed upon patients arrival to unit and care assumed. Pt aaox3. Skin w/d. Resp easy and nonlabored. Pt voices no c/o pain or discomfort. CBWR. Pt's daughters with pt.     2200  Pt cleaned of incont urine and purewick applied. 0000 Pt resting quietly with eyes closed. Resp easy and nonlabored. No distress noted. CBWR.     0400 Hourly rounding complete at this time. Pt. Resting quietly with call bell in reach. VSS. Blood pressure (!) 152/81, pulse 95, temperature 98.2 °F (36.8 °C), resp. rate 18, height 5' 2\" (1.575 m), weight 69.6 kg (153 lb 6.4 oz), SpO2 99 %.  in to draw am blood work.     0700 Bedside shift report given to oncoming nurse.

## 2022-11-10 NOTE — DISCHARGE SUMMARY
Discharge Summary       PATIENT ID: Fatoumata Ruiz  MRN: [id number]   YOB: 1940    DATE OF ADMISSION: 11/9/2022  2:27 PM    DATE OF DISCHARGE: 11/10/22    PRIMARY CARE PROVIDER: Jennifer Jennings MD     ATTENDING PHYSICIAN: Maricruz Verduzco MD  DISCHARGING PROVIDER: Maricruz Verduzco MD        CONSULTATIONS: IP CONSULT TO GASTROENTEROLOGY    PROCEDURES/SURGERIES: * No surgery found *    ADMITTING [facility] COURSE:   Fatoumata Ruiz is a 78 y.o. 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 At the time of my evaluation in the ED, patient was observed vomiting small frequent amounts of brown/coffee colored emesis. No chest pain, fevers, chills. No shortness of breath, fatigue or weakness. Patient denies bright red blood in stools, or melena. Patient states she had quit taking aspirin since September. Patient denies any recent EGDs or colonoscopies. Denies alcohol tobacco or illegal drug use. Denies history of liver disease. Daughter reports she is on prednisone for her chronic \"back pain\", which she was recently started couple weeks ago by her PCP. There are no other complaints verbalized. In the ED, CT of the abdomen/pelvis is unremarkable. Hemoglobin is 12, hematocrit is 40. INR is 1. Vital signs are normal, she received Protonix 80 mg IV push and hospitalist was asked to admit. DISCHARGE DIAGNOSES / PLAN:      Assessment & Plan:      #1: Acute GI bleed:  -presents with brown/coffee-ground emesis onset since this morning. admit to inpatient, ICU, monitor on telemetry.  -she is currently hemodynamically stable.  -H&H is 12/40, INR is 1.  -s/p NS bolus and Protonix 80 mg IV in the ED.  -cont Protonix drip.  -gentle hydration with NS at 50 ml/hr  -keep n.p.o. excepts meds  -H/H q 6 hr, type and screen.   -CT abd/pelvis w contrast is unremarkable.  -No aspirin, NSAIDs or anticoagulants. -GI consultation. #2: HFrEF:  -chronic condition, no acute exacerbation.  -Echo sept, 2022 shows an Ef of 34%, severely global hypokinesis, grade 2 diastolic dysfunction. #3: Hypertension:  -chronic, fairly stable.  -cont spironolactone and losartan. #4: COPD (not O2 dependent):  -Chronic condition, no acute exacerbation.  -cont, advair/wixela, fluticasone per home dose. Albuterol prn. #5: L3 compression fracture:  -Incidental finding on today's CT scan  -PT/OT eval and treat. Day of dc  [facility]  Cont ppi drip, hgb has dropped from 13 to 9.5 but now stable for 12 hours at this level, no melena, no further hematemesis since ed. Initiate transfer, we usually have GI coverage on Thursday, but GI is not here today. DIET: npo      No current facility-administered medications on file prior to encounter. Current Outpatient Medications on File Prior to Encounter   Medication Sig Dispense Refill    predniSONE (DELTASONE) 10 mg tablet Take 10 mg by mouth daily (with breakfast). acetaminophen (TYLENOL) 325 mg tablet Take 325 mg by mouth every four (4) hours as needed. spironolactone (ALDACTONE) 25 mg tablet Take 12.5 mg by mouth daily. losartan (COZAAR) 25 mg tablet Take 1 Tablet by mouth daily. 30 Tablet 3    cycloSPORINE (RESTASIS) 0.05 % dpet Administer 1 Drop to both eyes every twelve (12) hours. carvediloL (COREG) 25 mg tablet Take 1 Tablet by mouth two (2) times daily (with meals). fluticasone propion-salmeteroL (ADVAIR/WIXELA) 250-50 mcg/dose diskus inhaler Take 1 Puff by inhalation every twelve (12) hours. albuterol (PROVENTIL VENTOLIN) 2.5 mg /3 mL (0.083 %) nebu Take 2.5 mg by inhalation. fluticasone propionate (FLONASE) 50 mcg/actuation nasal spray 2 sprays by Both Nostrils route nightly.       triamcinolone acetonide (KENALOG) 0.1 % topical cream  (Patient not taking: Reported on 11/9/2022)      traMADoL (ULTRAM) 50 mg tablet Take 50 mg by mouth every six (6) hours as needed for Pain. (Patient not taking: Reported on 11/9/2022)      amoxicillin-clavulanate (AUGMENTIN) 875-125 mg per tablet Take  by mouth every twelve (12) hours. (Patient not taking: Reported on 11/9/2022)      doxepin (SINEquan) 10 mg capsule Take 10 mg by mouth nightly. (Patient not taking: Reported on 11/9/2022)      montelukast (SINGULAIR) 10 mg tablet Take 10 mg by mouth daily. (Patient not taking: Reported on 11/9/2022)      aspirin 81 mg chewable tablet 1 tablet (Patient not taking: Reported on 11/9/2022)             NOTIFY YOUR PHYSICIAN FOR ANY OF THE FOLLOWING:   Fever over 101 degrees for 24 hours. Chest pain, shortness of breath, fever, chills, nausea, vomiting, diarrhea, change in mentation, falling, weakness, bleeding. Severe pain or pain not relieved by medications. Or, any other signs or symptoms that you may have questions about. DISPOSITION: acute care facility    Home With:   OT  PT  HH  RN       Long term SNF/Inpatient Rehab    Independent/assisted living    Hospice    Other:       PHYSICAL EXAMINATION AT DISCHARGE:  General:          Alert, cooperative, no distress, appears stated age. HEENT:           Atraumatic, anicteric sclerae, pink conjunctivae                          No oral ulcers, mucosa moist, throat clear, dentition fair  Neck:               Supple, symmetrical  Lungs:             Clear to auscultation bilaterally. No Wheezing or Rhonchi. No rales. Chest wall:      No tenderness  No Accessory muscle use. Heart:              Regular  rhythm,  No  murmur   No edema  Abdomen:        Soft, non-tender. Not distended. Bowel sounds normal  Extremities:     No cyanosis. No clubbing,                            Skin turgor normal, Capillary refill normal  Skin:                Not pale.   Not Jaundiced  No rashes   Psych: Not anxious or agitated. Neurologic:      Alert, moves all extremities, answers questions appropriately and responds to commands       [address]:  Problem List as of 11/10/2022 Never Reviewed            Codes Class Noted - Resolved    GI bleed ICD-10-CM: K92.2  ICD-9-CM: 578.9  11/9/2022 - Present        Acute GI bleeding ICD-10-CM: K92.2  ICD-9-CM: 578.9  11/9/2022 - Present        NSTEMI (non-ST elevated myocardial infarction) ([facility] ICD-10-CM: I21.4  ICD-9-CM: 410.70  9/3/2022 - Present        Sciatica ICD-10-CM: M54.30  ICD-9-CM: 724.3  9/2/2022 - Present        Diverticulitis ICD-10-CM: K57.92  ICD-9-CM: 562.11  9/2/2022 - Present        Elevated troponin ICD-10-CM: R77.8  ICD-9-CM: 790.6  9/2/2022 - Present        GERD (gastroesophageal reflux disease) ICD-10-CM: K21.9  ICD-9-CM: 530.81  Unknown - Present        Allergic rhinitis ICD-10-CM: J30.9  ICD-9-CM: 477.9  Unknown - Present        Chronic obstructive pulmonary disease ([facility] ICD-10-CM: J44.9  ICD-9-CM: 673  Unknown - Present    Overview Signed 1/29/2015 11:02 AM by Chirag George LPN     mild per pulmonary notes             Arthritis ICD-10-CM: M19.90  ICD-9-CM: 716.90  Unknown - Present    Overview Signed 1/29/2015 11:02 AM by Chirag George LPN     knee - pending surg             Thyroid disease ICD-10-CM: E07.9  ICD-9-CM: 246. 9  Unknown - Present    Overview Signed 1/29/2015 11:02 AM by Chirag George LPN     cyst on thyroid             Asthma ICD-10-CM: J45.909  ICD-9-CM: 493.90  Unknown - Present        Hypertension ICD-10-CM: I10  ICD-9-CM: 401.9  Unknown - Present        Osteoarthrosis, unspecified whether generalized or localized, pelvic region and thigh ICD-10-CM: M16.10  ICD-9-CM: 715.95  10/28/2014 - Present           Greater than 35 minutes were spent with the patient on counseling and coordination of care    Signed:   Lesly Westbrook MD  11/10/2022  12:19 PM

## 2022-11-10 NOTE — ROUTINE PROCESS
TRANSFER - OUT REPORT:    Verbal report given to YOBANY Dee (name) on Joann  being transferred to [address] [place] [address] (unit) for routine progression of care       Report consisted of patients Situation, Background, Assessment and   Recommendations(SBAR). Information from the following report(s) SBAR, ED Summary, and MAR was reviewed with the receiving nurse. Lines:   Peripheral IV 11/09/22 Right Antecubital (Active)   Site Assessment Clean, dry, & intact 11/09/22 1441        Opportunity for questions and clarification was provided.       Patient transported with:   Monitor  Tech

## 2022-11-10 NOTE — PROGRESS NOTES
Care Management Interventions  PCP Verified by CM: Yes  Last Visit to PCP: 09/23/22  Palliative Care Criteria Met (RRAT>21 & CHF Dx)?: No  Transition of Care Consult (CM Consult): Discharge Planning  Physical Therapy Consult: Yes  Occupational Therapy Consult: No  Speech Therapy Consult: No  Support Systems: Child(cal)  Confirm Follow Up Transport: Family  The Plan for Transition of Care is Related to the Following Treatment Goals : Patient centered discharge planning to ensure smooth transition to community and PLOF. Discharge Location  Patient Expects to be Discharged to[de-identified] Home with family assistance      Reason for Admission:   Chart reviewed and pt interviewed. Pt presented to ED for complaints of vomiting blood. Pt observed in the ED to have small amounts of brown/coffee colored emesis. CT unremarkable of abd/pelvis. Hospitalist consulted for admission. RUR Score:    18%              PCP: First and Last name:   Trina Penaloza MD     Name of Practice:    Are you a current patient: Yes/No:    Approximate date of last visit: 9-23-22   Can you participate in a virtual visit if needed:     Do you (patient/family) have any concerns for transition/discharge? Denies               Plan for utilizing home health:   Denies need     Current Advanced Directive/Advance Care Plan:  Full Code      Healthcare Decision Maker:   Click here to complete [address] including selection of the Healthcare Decision Maker Relationship (ie \"Primary\")            Primary Decision MakeRaina Williamson Daughter - [phone]    Transition of Care Plan:      Plan of care includes patient/primary care giver interview to develop discharge plan, consider home health, and follow up appointment to be arranged by nursing staff at discharge. Patient centered discharge planning to ensure smooth transition to community and PLOF. Pt has an apartment but stays with daughter majority of the time. Has a walker to use PRN, denies DME needs as well as HH. Pt POC is to return to daughters home at [place].

## 2022-11-10 NOTE — PROGRESS NOTES
Attempt to evaluate pt this morning, she requests to wait until she sees the GI team. Will return to attempt evaluation tomorrow.   Agusto Rebolledo, PT, DPT

## 2022-11-10 NOTE — PROGRESS NOTES
Problem: Pressure Injury - Risk of  Goal: *Prevention of pressure injury  Description: Document Charli Scale and appropriate interventions in the flowsheet. Outcome: Progressing Towards Goal  Note: Pressure Injury Interventions:  Sensory Interventions: Assess changes in LOC, Keep linens dry and wrinkle-free, Maintain/enhance activity level, Minimize linen layers    Moisture Interventions: Absorbent underpads, Minimize layers, Internal/External urinary devices    Activity Interventions: Increase time out of bed, PT/OT evaluation    Mobility Interventions: HOB 30 degrees or less, PT/OT evaluation    Nutrition Interventions: Discuss nutritional consult with provider    Friction and Shear Interventions: HOB 30 degrees or less, Minimize layers                Problem: Patient Education: Go to Patient Education Activity  Goal: Patient/Family Education  Outcome: Progressing Towards Goal     Problem: Falls - Risk of  Goal: *Absence of Falls  Description: Document Oswaldo Fall Risk and appropriate interventions in the flowsheet.   Outcome: Progressing Towards Goal  Note: Fall Risk Interventions:  Mobility Interventions: Utilize walker, cane, or other assistive device, Strengthening exercises (ROM-active/passive), PT Consult for mobility concerns, PT Consult for assist device competence         Medication Interventions: Patient to call before getting OOB, Teach patient to arise slowly    Elimination Interventions: Bed/chair exit alarm, Call light in reach, Patient to call for help with toileting needs, Toileting schedule/hourly rounds              Problem: Patient Education: Go to Patient Education Activity  Goal: Patient/Family Education  Outcome: Progressing Towards Goal     Problem: Patient Education: Go to Patient Education Activity  Goal: Patient/Family Education  Outcome: Progressing Towards Goal     Problem: Upper and Lower GI Bleed: Day 1  Goal: Off Pathway (Use only if patient is Off Pathway)  Outcome: Progressing Towards Goal  Goal: Activity/Safety  Outcome: Progressing Towards Goal  Goal: Consults, if ordered  Outcome: Progressing Towards Goal  Goal: Diagnostic Test/Procedures  Outcome: Progressing Towards Goal  Goal: Nutrition/Diet  Outcome: Progressing Towards Goal  Goal: Discharge Planning  Outcome: Progressing Towards Goal  Goal: Medications  Outcome: Progressing Towards Goal  Goal: Respiratory  Outcome: Progressing Towards Goal  Goal: Treatments/Interventions/Procedures  Outcome: Progressing Towards Goal  Goal: Psychosocial  Outcome: Progressing Towards Goal  Goal: *Optimal pain control at patient's stated goal  Outcome: Progressing Towards Goal  Goal: *Hemodynamically stable  Outcome: Progressing Towards Goal  Goal: *Demonstrates progressive activity  Outcome: Progressing Towards Goal     Problem: Upper and Lower GI Bleed: Day 2  Goal: Off Pathway (Use only if patient is Off Pathway)  Outcome: Progressing Towards Goal  Goal: Activity/Safety  Outcome: Progressing Towards Goal  Goal: Consults, if ordered  Outcome: Progressing Towards Goal  Goal: Diagnostic Test/Procedures  Outcome: Progressing Towards Goal  Goal: Nutrition/Diet  Outcome: Progressing Towards Goal  Goal: Discharge Planning  Outcome: Progressing Towards Goal  Goal: Medications  Outcome: Progressing Towards Goal  Goal: Respiratory  Outcome: Progressing Towards Goal  Goal: Treatments/Interventions/Procedures  Outcome: Progressing Towards Goal  Goal: Psychosocial  Outcome: Progressing Towards Goal  Goal: *Optimal pain control at patient's stated goal  Outcome: Progressing Towards Goal  Goal: *Hemodynamically stable  Outcome: Progressing Towards Goal  Goal: *Tolerating diet  Outcome: Progressing Towards Goal  Goal: *Demonstrates progressive activity  Outcome: Progressing Towards Goal     Problem: Upper and Lower GI Bleed: Day 3  Goal: Off Pathway (Use only if patient is Off Pathway)  Outcome: Progressing Towards Goal  Goal: Activity/Safety  Outcome: Progressing Towards Goal  Goal: Diagnostic Test/Procedures  Outcome: Progressing Towards Goal  Goal: Nutrition/Diet  Outcome: Progressing Towards Goal  Goal: Discharge Planning  Outcome: Progressing Towards Goal  Goal: Medications  Outcome: Progressing Towards Goal  Goal: Treatments/Interventions/Procedures  Outcome: Progressing Towards Goal  Goal: Psychosocial  Outcome: Progressing Towards Goal     Problem: Upper and Lower GI Bleed:  Discharge Outcomes  Goal: *Hemodynamically stable  Outcome: Progressing Towards Goal  Goal: *Lungs clear or at baseline  Outcome: Progressing Towards Goal  Goal: *Demonstrates independent activity or return to baseline  Outcome: Progressing Towards Goal  Goal: *Pain is controlled to three or less  Outcome: Progressing Towards Goal  Goal: *Verbalizes understanding and describes prescribed diet  Outcome: Progressing Towards Goal  Goal: *Tolerating diet  Outcome: Progressing Towards Goal  Goal: *Verbalizes name, dosage, time, side effects, and number of days to continue medications  Outcome: Progressing Towards Goal  Goal: *Anxiety reduced or absent  Outcome: Progressing Towards Goal  Goal: *Understands and describes signs and symptoms to report to providers(Stroke Metric)  Outcome: Progressing Towards Goal  Goal: *Describes follow-up/return visits to physicians  Outcome: Progressing Towards Goal  Goal: *Describes available resources and support systems  Outcome: Progressing Towards Goal

## 2022-11-10 NOTE — H&P
History and Physical    Subjective:     Bill Hauser is a 78 y.o. 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 At the time of my evaluation in the ED, patient was observed vomiting small frequent amounts of brown/coffee colored emesis. No chest pain, fevers, chills. No shortness of breath, fatigue or weakness. Patient denies bright red blood in stools, or melena. Patient states she had quit taking aspirin since September. Patient denies any recent EGDs or colonoscopies. Denies alcohol tobacco or illegal drug use. Denies history of liver disease. Daughter reports she is on prednisone for her chronic \"back pain\", which she was recently started couple weeks ago by her PCP. There are no other complaints verbalized. In the ED, CT of the abdomen/pelvis is unremarkable. Hemoglobin is 12, hematocrit is 40. INR is 1. Vital signs are normal, she received Protonix 80 mg IV push and hospitalist was asked to admit. Past Medical History:   Diagnosis Date    Allergic rhinitis     Arthritis     knee - pending surg    Asthma     Chronic obstructive pulmonary disease (HCC)     mild per pulmonary notes    GERD (gastroesophageal reflux disease)     Hypertension     Thromboembolus (HCC)     many years ago    Thyroid disease     cyst on thyroid      Past Surgical History:   Procedure Laterality Date    HX APPENDECTOMY  age 23    HX HYSTERECTOMY  age 35    HX KNEE REPLACEMENT Left 2011    Dr. Jean Timmons Left age 23    inguinal     No family history on file.    Social History     Tobacco Use    Smoking status: Former     Packs/day: 1.00     Types: Cigarettes    Smokeless tobacco: Never    Tobacco comments:     smoke for appox 18 months at age 23   Substance Use Topics    Alcohol use: Not Currently     Comment: rarely       Prior to Admission medications    Medication Sig Start Date End Date Taking? Authorizing Provider   predniSONE (DELTASONE) 10 mg tablet Take 10 mg by mouth daily (with breakfast). Yes Deonte, MD Kathy   acetaminophen (TYLENOL) 325 mg tablet Take 325 mg by mouth every four (4) hours as needed. Yes Other, MD Kathy   spironolactone (ALDACTONE) 25 mg tablet Take 12.5 mg by mouth daily. 9/9/22  Yes Kathy Clemons MD   losartan (COZAAR) 25 mg tablet Take 1 Tablet by mouth daily. 9/5/22  Yes Olinda Moss MD   cycloSPORINE (RESTASIS) 0.05 % dpet Administer 1 Drop to both eyes every twelve (12) hours. Yes Provider, Historical   carvediloL (COREG) 25 mg tablet Take 1 Tablet by mouth two (2) times daily (with meals). Yes Other, MD Kathy   fluticasone propion-salmeteroL (ADVAIR/WIXELA) 250-50 mcg/dose diskus inhaler Take 1 Puff by inhalation every twelve (12) hours. Yes Other, MD Kathy   albuterol (PROVENTIL VENTOLIN) 2.5 mg /3 mL (0.083 %) nebu Take 2.5 mg by inhalation. Yes Other, MD Kathy   fluticasone propionate (FLONASE) 50 mcg/actuation nasal spray 2 sprays by Both Nostrils route nightly. Yes Provider, Historical   triamcinolone acetonide (KENALOG) 0.1 % topical cream  7/11/22   Other, MD Kathy   traMADoL (ULTRAM) 50 mg tablet Take 50 mg by mouth every six (6) hours as needed for Pain. Patient not taking: Reported on 11/9/2022    Other, MD Kathy   amoxicillin-clavulanate (AUGMENTIN) 875-125 mg per tablet Take  by mouth every twelve (12) hours. Patient not taking: Reported on 11/9/2022    Deonte, MD Kathy   doxepin (SINEquan) 10 mg capsule Take 10 mg by mouth nightly. Patient not taking: Reported on 11/9/2022    Provider, Historical   montelukast (SINGULAIR) 10 mg tablet Take 10 mg by mouth daily.   Patient not taking: Reported on 11/9/2022    Provider, Historical   aspirin 81 mg chewable tablet 1 tablet  Patient not taking: Reported on 11/9/2022    Other, MD Kathy     Allergies   Allergen Reactions    Ace Inhibitors Other (comments)     Not sure, was a long time ago    Ceclor [Cefaclor] Hives and Itching    Cephalosporins Rash and Itching    Ketek [Telithromycin] Hives and Itching        Review of Systems:  Negative except as mentioned in HPI. Objective:     Vitals:  Visit Vitals  /80   Pulse 93   Temp 98.6 °F (37 °C)   Resp 22   Ht 5' 2\" (1.575 m)   Wt 70.3 kg (155 lb)   SpO2 98%   BMI 28.35 kg/m²       Physical Exam:  General: Not ill or toxic appearing, awake, alert, well oriented x4, cooperative, in no acute distress. Head:  Normocephalic, without obvious abnormality, atraumatic. Eyes: Conjunctivae/corneas clear. Pupils equal, round, reactive to light. Extraocular movements intact. No scleral icterus. Lungs: Clear to auscultation bilaterally, no wheezes, no crackles. Chest wall: No tenderness or deformity. Heart: Regular rate and rhythm, S1, S2 normal, no murmur, click, rub, or gallop. Abdomen: Soft, non-tender, nondistended, bowel sounds active, and no palpable masses. Back:  No spine tenderness to palpation  Extremities:Extremities normal, atraumatic, no cyanosis or peripheral edema. Pulses: 2+ and symmetric all extremities. Skin: Not pale, not jaundiced, no rashes or lesions overall skin color, texture, turgor normal.   Lymph nodes: Cervical nodes normal.  Neurologic: Awake and oriented, x4. No focal neurologic deficits.       Labs:  Recent Results (from the past 24 hour(s))   CBC WITH AUTOMATED DIFF    Collection Time: 11/09/22  2:40 PM   Result Value Ref Range    WBC 12.7 4.6 - 13.2 K/uL    RBC 4.23 4.20 - 5.30 M/uL    HGB 12.5 12.0 - 16.0 g/dL    HCT 40.2 35.0 - 45.0 %    MCV 95.0 78.0 - 100.0 FL    MCH 29.6 24.0 - 34.0 PG    MCHC 31.1 31.0 - 37.0 g/dL    RDW 17.6 (H) 11.6 - 14.5 %    PLATELET 393 101 - 936 K/uL    MPV 9.3 9.2 - 11.8 FL    NRBC 0.0 0.0  WBC    ABSOLUTE NRBC 0.00 0.00 - 0.01 K/uL    NEUTROPHILS 81 (H) 40 - 73 %    LYMPHOCYTES 11 (L) 21 - 52 %    MONOCYTES 6 3 - 10 %    EOSINOPHILS 1 0 - 5 %    BASOPHILS 0 0 - 2 %    IMMATURE GRANULOCYTES 1 (H) 0 - 0.5 %    ABS. NEUTROPHILS 10.3 (H) 1.8 - 8.0 K/UL    ABS. LYMPHOCYTES 1.4 0.9 - 3.6 K/UL    ABS. MONOCYTES 0.8 0.05 - 1.2 K/UL    ABS. EOSINOPHILS 0.1 0.0 - 0.4 K/UL    ABS. BASOPHILS 0.0 0.0 - 0.1 K/UL    ABS. IMM. GRANS. 0.1 (H) 0.00 - 0.04 K/UL    DF AUTOMATED     TYPE & SCREEN    Collection Time: 11/09/22  2:40 PM   Result Value Ref Range    Crossmatch Expiration 11/12/2022,2359     ABO/Rh(D) O Positive     Antibody screen Negative    PROTHROMBIN TIME + INR    Collection Time: 11/09/22  2:40 PM   Result Value Ref Range    Prothrombin time 13.9 11.5 - 15.2 sec    INR 1.0 0.8 - 1.2     METABOLIC PANEL, COMPREHENSIVE    Collection Time: 11/09/22  2:40 PM   Result Value Ref Range    Sodium 138 136 - 145 mmol/L    Potassium 4.1 3.5 - 5.5 mmol/L    Chloride 105 100 - 111 mmol/L    CO2 26 21 - 32 mmol/L    Anion gap 7 3.0 - 18.0 mmol/L    Glucose 116 (H) 74 - 99 mg/dL    BUN 34 (H) 7 - 18 mg/dL    Creatinine 1.08 0.60 - 1.30 mg/dL    BUN/Creatinine ratio 31 (H) 12 - 20      eGFR 51 (L) >60 ml/min/1.73m2    Calcium 9.0 8.5 - 10.1 mg/dL    Bilirubin, total 0.7 0.2 - 1.0 mg/dL    AST (SGOT) 11 10 - 38 U/L    ALT (SGPT) 14 13 - 56 U/L    Alk.  phosphatase 97 45 - 117 U/L    Protein, total 8.1 6.4 - 8.2 g/dL    Albumin 3.2 (L) 3.4 - 5.0 g/dL    Globulin 4.9 (H) 2.0 - 4.0 g/dL    A-G Ratio 0.7 (L) 0.8 - 1.7     TROPONIN-HIGH SENSITIVITY    Collection Time: 11/09/22  2:40 PM   Result Value Ref Range    Troponin-High Sensitivity 86 (H) 0 - 54 ng/L   LIPASE    Collection Time: 11/09/22  2:40 PM   Result Value Ref Range    Lipase 119 73 - 393 U/L   LACTIC ACID    Collection Time: 11/09/22  2:40 PM   Result Value Ref Range    Lactic acid 1.9 0.4 - 2.0 mmol/L   MAGNESIUM    Collection Time: 11/09/22  2:40 PM   Result Value Ref Range    Magnesium 2.1 1.6 - 2.6 mg/dL       Imaging:  CT ABD PELV W CONT    Result Date: 11/9/2022  EXAM: CT of the abdomen and pelvis with contrast 11/9/2022. INDICATION: Hematemesis. COMPARISON: CT scan of the abdomen and pelvis from 9/1/2022 TECHNIQUE: Axial CT imaging of the abdomen and pelvis was performed during the dynamic infusion of 100 cc of Isovue-370. Multiplanar reformats were generated. Dose reduction techniques:  Automated exposure control, mAs and/or kVp reductions based on patient size, and iterative reconstruction. The specific techniques utilized on this CT exam have been documented in the patient's electronic medical record. Digital imaging and communications and medicine (DICOM) format image data are available to nonaffiliated external healthcare facilities or entities on a secure, media free, reciprocally searchable basis with patient authorization for at least a 12 month period after this study. _______________ FINDINGS: LIVER, BILIARY: Liver and gallbladder are unremarkable. No biliary dilation. PANCREAS: WNL. SPLEEN: WNL. ADRENALS: Unremarkable. KIDNEYS: Exophytic cysts are again noted arising from the right kidney and are unchanged. There is no evidence of urolithiasis or hydronephrosis. LYMPH NODES: WNL. PERITONEAL CAVITY AND GASTROINTESTINAL TRACT: No free air or free fluid. No bowel dilation or wall thickening. Diffuse colonic diverticula are present throughout the descending and sigmoid portions of the colon. There are no imaging features to suggest diverticulitis. A small hiatal hernia is noted. A normal-appearing appendix is identified emanating from the cecal tip and extending into the right side of the pelvis. VASCULATURE: Atherosclerotic vascular disease again noted. There is a small, densely calcified right renal artery aneurysm which is unchanged. LOWER CHEST: Small pericardial effusion is again noted. Mild bibasilar atelectasis or scarring is present. PELVIC VISCERA: The urinary bladder is partially contracted but grossly unremarkable. The uterus appears to be surgically absent.   No abnormal intrapelvic masses or fluid collections are present. BONES: There has been interval development of a mild superior endplate compression fracture of L3. Linear areas of lucency are present with sclerosis subjacent to the superior endplate. Multilevel degenerative disc and degenerative facet disease are present with grade 1 degenerative anterolisthesis of L4 on L5. There is associated severe central canal stenosis at the L4-5 level. OTHER: None. _______________     1. Mild, acute to subacute superior endplate compression fracture of L3 which is new in correlation with the prior examination from September. 2.  Colonic diverticulosis without CT evidence of diverticulitis. 3.  Small pericardial effusion, unchanged. 4.  Additional findings, as described above and without significant interval change. .       Assessment & Plan:     #1: Acute GI bleed:  -presents with brown/coffee-ground emesis onset since this morning. admit to inpatient, ICU, monitor on telemetry.  -she is currently hemodynamically stable.  -H&H is 12/40, INR is 1.  -s/p NS bolus and Protonix 80 mg IV in the ED.  -cont Protonix drip.  -gentle hydration with NS at 50 ml/hr  -keep n.p.o. excepts meds  -H/H q 6 hr, type and screen. -CT abd/pelvis w contrast is unremarkable.  -No aspirin, NSAIDs or anticoagulants. -GI consultation. #2: HFrEF:  -chronic condition, no acute exacerbation.  -Echo sept, 2022 shows an Ef of 34%, severely global hypokinesis, grade 2 diastolic dysfunction. #3: Hypertension:  -chronic, fairly stable.  -cont spironolactone and losartan. #4: COPD (not O2 dependent):  -Chronic condition, no acute exacerbation.  -cont, advair/wixela, fluticasone per home dose. Albuterol prn. #5: L3 compression fracture:  -Incidental finding on today's CT scan  -PT/OT eval and treat. VTE prophylaxis: None due to acute GI bleed.   Code status: Full code  Total time spent: 45 minutes  Plan of care discussed with patient, and her 2 daughters at bedside.

## 2022-11-10 NOTE — PROGRESS NOTES
The [facility] formulary substitution for Advair 250/50 1 puff bid  is Dulera 200/5 2 puffs bid.

## 2022-11-10 NOTE — ROUTINE PROCESS
TRANSFER - OUT REPORT:    Verbal report given to Nicklas Shone (name) on Pimento  being transferred to ICU (unit) for routine progression of care       Report consisted of patients Situation, Background, Assessment and   Recommendations(SBAR). Information from the following report(s) SBAR, ED Summary, and MAR was reviewed with the receiving nurse. Lines:   Peripheral IV 11/09/22 Right Antecubital (Active)   Site Assessment Clean, dry, & intact 11/09/22 1441        Opportunity for questions and clarification was provided.       Patient transported with:   Monitor  Tech

## 2022-11-10 NOTE — H&P
INTERIM UPDATE - 1240 EST on 11/10/2022    [facility] called requesting Transfer for an 78-year-old female who presented on 11/09/2022 with coffee-ground emesis. Transfer was initiated because typical Gastroenterological coverage at [facility] would normally be available today; however, Gastroenterologist is apparently not available today for another reason and Transfer to [facility] is initiated for this reason. Patient is noted to have dropped 3 g/dL of Hemoglobin over the course of 12 hours in the ER and then Hemoglobin has remained stable. Patient has not had melena, hematochezia, or coffee-ground emeses in the last 12 hours and has remained hemodynamically stable per [facility].  Patient does not have a GI History and does not have a history of Cirrhosis or Esophageal varices (Patient was not started on IV Octreotide). Patient was started on an IV Pantoprazole gtt. Patient is noted to have received only IV  mL making this drop in Hemoglobin non-dilutional.    Plan: Will admit this Patient to [facility] Unit for Acute Upper GI bleed. Herb Girard ER Physician to contact us again if Patient becomes unstable, exhibits hypotension, or has a drop in Hemoglobin requiring blood transfusion.

## 2022-11-11 LAB
ANION GAP SERPL CALC-SCNC: 9 MMOL/L (ref 3–18)
BUN SERPL-MCNC: 33 MG/DL (ref 7–18)
BUN/CREAT SERPL: 33 (ref 12–20)
CA-I BLD-MCNC: 8.5 MG/DL (ref 8.5–10.1)
CHLORIDE SERPL-SCNC: 115 MMOL/L (ref 100–111)
CO2 SERPL-SCNC: 22 MMOL/L (ref 21–32)
CREAT SERPL-MCNC: 0.99 MG/DL (ref 0.6–1.3)
GLUCOSE SERPL-MCNC: 81 MG/DL (ref 74–99)
HCT VFR BLD AUTO: 27.9 % (ref 35–45)
HCT VFR BLD AUTO: 27.9 % (ref 35–45)
HCT VFR BLD AUTO: 28 % (ref 35–45)
HCT VFR BLD AUTO: 29.2 % (ref 35–45)
HGB BLD-MCNC: 8.4 G/DL (ref 12–16)
HGB BLD-MCNC: 8.5 G/DL (ref 12–16)
HGB BLD-MCNC: 8.6 G/DL (ref 12–16)
HGB BLD-MCNC: 9 G/DL (ref 12–16)
MCH RBC QN AUTO: 29.2 PG (ref 24–34)
MCH RBC QN AUTO: 29.4 PG (ref 24–34)
MCH RBC QN AUTO: 29.9 PG (ref 24–34)
MCH RBC QN AUTO: 30 PG (ref 24–34)
MCHC RBC AUTO-ENTMCNC: 30.1 G/DL (ref 31–37)
MCHC RBC AUTO-ENTMCNC: 30.4 G/DL (ref 31–37)
MCHC RBC AUTO-ENTMCNC: 30.8 G/DL (ref 31–37)
MCHC RBC AUTO-ENTMCNC: 30.8 G/DL (ref 31–37)
POTASSIUM SERPL-SCNC: 3.8 MMOL/L (ref 3.5–5.5)
SODIUM SERPL-SCNC: 146 MMOL/L (ref 136–145)

## 2022-11-11 PROCEDURE — 74011250637 HC RX REV CODE- 250/637: Performed by: INTERNAL MEDICINE

## 2022-11-11 PROCEDURE — 94640 AIRWAY INHALATION TREATMENT: CPT

## 2022-11-11 PROCEDURE — 74011250636 HC RX REV CODE- 250/636: Performed by: INTERNAL MEDICINE

## 2022-11-11 PROCEDURE — C9113 INJ PANTOPRAZOLE SODIUM, VIA: HCPCS | Performed by: INTERNAL MEDICINE

## 2022-11-11 PROCEDURE — 65610000006 HC RM INTENSIVE CARE

## 2022-11-11 PROCEDURE — 94761 N-INVAS EAR/PLS OXIMETRY MLT: CPT

## 2022-11-11 PROCEDURE — 74011000258 HC RX REV CODE- 258: Performed by: INTERNAL MEDICINE

## 2022-11-11 PROCEDURE — 36415 COLL VENOUS BLD VENIPUNCTURE: CPT

## 2022-11-11 PROCEDURE — 85018 HEMOGLOBIN: CPT

## 2022-11-11 PROCEDURE — 80048 BASIC METABOLIC PNL TOTAL CA: CPT

## 2022-11-11 PROCEDURE — 74011250637 HC RX REV CODE- 250/637: Performed by: NURSE PRACTITIONER

## 2022-11-11 PROCEDURE — 74011250636 HC RX REV CODE- 250/636: Performed by: NURSE PRACTITIONER

## 2022-11-11 RX ORDER — ALPRAZOLAM 0.5 MG/1
0.5 TABLET ORAL
Status: DISCONTINUED | OUTPATIENT
Start: 2022-11-11 | End: 2022-11-13 | Stop reason: HOSPADM

## 2022-11-11 RX ADMIN — SODIUM CHLORIDE 125 MG: 9 INJECTION, SOLUTION INTRAVENOUS at 15:25

## 2022-11-11 RX ADMIN — SODIUM CHLORIDE 8 MG/HR: 900 INJECTION INTRAVENOUS at 10:30

## 2022-11-11 RX ADMIN — CARVEDILOL 25 MG: 12.5 TABLET, FILM COATED ORAL at 10:02

## 2022-11-11 RX ADMIN — CYCLOSPORINE 1 DROP: 0.5 EMULSION OPHTHALMIC at 09:00

## 2022-11-11 RX ADMIN — SODIUM CHLORIDE 8 MG/HR: 900 INJECTION INTRAVENOUS at 02:40

## 2022-11-11 RX ADMIN — MOMETASONE FUROATE AND FORMOTEROL FUMARATE DIHYDRATE 2 PUFF: 200; 5 AEROSOL RESPIRATORY (INHALATION) at 20:00

## 2022-11-11 RX ADMIN — MOMETASONE FUROATE AND FORMOTEROL FUMARATE DIHYDRATE 2 PUFF: 200; 5 AEROSOL RESPIRATORY (INHALATION) at 08:04

## 2022-11-11 RX ADMIN — SODIUM CHLORIDE 8 MG/HR: 900 INJECTION INTRAVENOUS at 21:51

## 2022-11-11 RX ADMIN — SODIUM CHLORIDE 8 MG/HR: 900 INJECTION INTRAVENOUS at 15:08

## 2022-11-11 RX ADMIN — ALPRAZOLAM 0.5 MG: 0.5 TABLET ORAL at 13:24

## 2022-11-11 RX ADMIN — SODIUM CHLORIDE 50 ML/HR: 9 INJECTION, SOLUTION INTRAVENOUS at 13:24

## 2022-11-11 RX ADMIN — CARVEDILOL 25 MG: 12.5 TABLET, FILM COATED ORAL at 17:28

## 2022-11-11 RX ADMIN — FLUTICASONE PROPIONATE 2 SPRAY: 50 SPRAY, METERED NASAL at 21:34

## 2022-11-11 RX ADMIN — ALPRAZOLAM 0.5 MG: 0.5 TABLET ORAL at 21:50

## 2022-11-11 NOTE — PROGRESS NOTES
Problem: Pressure Injury - Risk of  Goal: *Prevention of pressure injury  Description: Document Charli Scale and appropriate interventions in the flowsheet. Outcome: Progressing Towards Goal  Note: Pressure Injury Interventions:  Sensory Interventions: Assess changes in LOC, Keep linens dry and wrinkle-free, Maintain/enhance activity level, Minimize linen layers    Moisture Interventions: Minimize layers, Maintain skin hydration (lotion/cream)    Activity Interventions: Increase time out of bed    Mobility Interventions: HOB 30 degrees or less    Nutrition Interventions: Discuss nutritional consult with provider    Friction and Shear Interventions: HOB 30 degrees or less, Minimize layers                Problem: Patient Education: Go to Patient Education Activity  Goal: Patient/Family Education  Outcome: Progressing Towards Goal     Problem: Falls - Risk of  Goal: *Absence of Falls  Description: Document Oswaldo Fall Risk and appropriate interventions in the flowsheet.   Outcome: Progressing Towards Goal  Note: Fall Risk Interventions:  Mobility Interventions: Strengthening exercises (ROM-active/passive)         Medication Interventions: Teach patient to arise slowly    Elimination Interventions: Call light in reach, Toilet paper/wipes in reach              Problem: Patient Education: Go to Patient Education Activity  Goal: Patient/Family Education  Outcome: Progressing Towards Goal     Problem: Patient Education: Go to Patient Education Activity  Goal: Patient/Family Education  Outcome: Progressing Towards Goal     Problem: Upper and Lower GI Bleed: Day 1  Goal: Off Pathway (Use only if patient is Off Pathway)  Outcome: Progressing Towards Goal  Goal: Activity/Safety  Outcome: Progressing Towards Goal  Goal: Consults, if ordered  Outcome: Progressing Towards Goal  Goal: Diagnostic Test/Procedures  Outcome: Progressing Towards Goal  Goal: Nutrition/Diet  Outcome: Progressing Towards Goal  Goal: Discharge Planning  Outcome: Progressing Towards Goal  Goal: Medications  Outcome: Progressing Towards Goal  Goal: Respiratory  Outcome: Progressing Towards Goal  Goal: Treatments/Interventions/Procedures  Outcome: Progressing Towards Goal  Goal: Psychosocial  Outcome: Progressing Towards Goal  Goal: *Optimal pain control at patient's stated goal  Outcome: Progressing Towards Goal  Goal: *Hemodynamically stable  Outcome: Progressing Towards Goal  Goal: *Demonstrates progressive activity  Outcome: Progressing Towards Goal     Problem: Upper and Lower GI Bleed: Day 2  Goal: Off Pathway (Use only if patient is Off Pathway)  Outcome: Progressing Towards Goal  Goal: Activity/Safety  Outcome: Progressing Towards Goal  Goal: Consults, if ordered  Outcome: Progressing Towards Goal  Goal: Diagnostic Test/Procedures  Outcome: Progressing Towards Goal  Goal: Nutrition/Diet  Outcome: Progressing Towards Goal  Goal: Discharge Planning  Outcome: Progressing Towards Goal  Goal: Medications  Outcome: Progressing Towards Goal  Goal: Respiratory  Outcome: Progressing Towards Goal  Goal: Treatments/Interventions/Procedures  Outcome: Progressing Towards Goal  Goal: Psychosocial  Outcome: Progressing Towards Goal  Goal: *Optimal pain control at patient's stated goal  Outcome: Progressing Towards Goal  Goal: *Hemodynamically stable  Outcome: Progressing Towards Goal  Goal: *Tolerating diet  Outcome: Progressing Towards Goal  Goal: *Demonstrates progressive activity  Outcome: Progressing Towards Goal     Problem: Upper and Lower GI Bleed: Day 3  Goal: Off Pathway (Use only if patient is Off Pathway)  Outcome: Progressing Towards Goal  Goal: Activity/Safety  Outcome: Progressing Towards Goal  Goal: Diagnostic Test/Procedures  Outcome: Progressing Towards Goal  Goal: Nutrition/Diet  Outcome: Progressing Towards Goal  Goal: Discharge Planning  Outcome: Progressing Towards Goal  Goal: Medications  Outcome: Progressing Towards Goal  Goal: Treatments/Interventions/Procedures  Outcome: Progressing Towards Goal  Goal: Psychosocial  Outcome: Progressing Towards Goal     Problem: Upper and Lower GI Bleed:  Discharge Outcomes  Goal: *Hemodynamically stable  Outcome: Progressing Towards Goal  Goal: *Lungs clear or at baseline  Outcome: Progressing Towards Goal  Goal: *Demonstrates independent activity or return to baseline  Outcome: Progressing Towards Goal  Goal: *Pain is controlled to three or less  Outcome: Progressing Towards Goal  Goal: *Verbalizes understanding and describes prescribed diet  Outcome: Progressing Towards Goal  Goal: *Tolerating diet  Outcome: Progressing Towards Goal  Goal: *Verbalizes name, dosage, time, side effects, and number of days to continue medications  Outcome: Progressing Towards Goal  Goal: *Anxiety reduced or absent  Outcome: Progressing Towards Goal  Goal: *Understands and describes signs and symptoms to report to providers(Stroke Metric)  Outcome: Progressing Towards Goal  Goal: *Describes follow-up/return visits to physicians  Outcome: Progressing Towards Goal  Goal: *Describes available resources and support systems  Outcome: Progressing Towards Goal

## 2022-11-11 NOTE — PROGRESS NOTES
1900  Bedside shift change report given to NAV Wu RN (oncoming nurse) by Aye Bowers RN (offgoing nurse). Report included the following information SBAR, Kardex, Intake/Output, MAR, Recent Results, Med Rec Status, and Cardiac Rhythm NSR with ST depression. . Pt waiting for a bed to be transferred [facility]. 1930  PM assessment done. See flow sheet. Pt aaox3. Pt voices no c/o pain or discomfort. CBWR.     0000 Pt resting quietly with eyes closed. Resp easy and nonlabored. No distress noted. CBWR.     0200  Pt rang call requesting to get on bsc . Pt assisted on bsc  void but states she was unable to have a bowel movement. 0420  Hourly rounding complete at this time. Pt. Resting quietly with call bell in reach. VSS. Blood pressure (!) 120/51, pulse 72, temperature 98.2 °F (36.8 °C), resp. rate 18, height 5' 2\" (1.575 m), weight 68.7 kg (151 lb 8 oz), SpO2 96 %.    0700  Bedside shift report given to oncclarence nurse.

## 2022-11-11 NOTE — DISCHARGE SUMMARY
Discharge Summary       PATIENT ID: Kanwal Cardozo  MRN: [id number]   YOB: 1940    DATE OF ADMISSION: 11/9/2022  2:27 PM    DATE OF DISCHARGE: 11/11/22    PRIMARY CARE PROVIDER: Sarah Rivas MD     ATTENDING PHYSICIAN: Stewart Sheppard MD  DISCHARGING PROVIDER: Stewart Sheppard MD        CONSULTATIONS: IP CONSULT TO GASTROENTEROLOGY    PROCEDURES/SURGERIES: * No surgery found *    ADMITTING [facility] COURSE:   Kanwal Cardozo is a 78 y.o. 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 At the time of my evaluation in the ED, patient was observed vomiting small frequent amounts of brown/coffee colored emesis. No chest pain, fevers, chills. No shortness of breath, fatigue or weakness. Patient denies bright red blood in stools, or melena. Patient states she had quit taking aspirin since September. Patient denies any recent EGDs or colonoscopies. Denies alcohol tobacco or illegal drug use. Denies history of liver disease. Daughter reports she is on prednisone for her chronic \"back pain\", which she was recently started couple weeks ago by her PCP. There are no other complaints verbalized. In the ED, CT of the abdomen/pelvis is unremarkable. Hemoglobin is 12, hematocrit is 40. INR is 1. Vital signs are normal, she received Protonix 80 mg IV push and hospitalist was asked to admit. DISCHARGE DIAGNOSES / PLAN:      Assessment & Plan:      #1: Acute GI bleed:  -presents with brown/coffee-ground emesis onset since this morning. admit to inpatient, ICU, monitor on telemetry.  -she is currently hemodynamically stable.  -H&H is 12/40, INR is 1.  -s/p NS bolus and Protonix 80 mg IV in the ED.  -cont Protonix drip.  -gentle hydration with NS at 50 ml/hr  -keep n.p.o. excepts meds  -H/H q 6 hr, type and screen.   -CT abd/pelvis w contrast is unremarkable.  -No aspirin, NSAIDs or anticoagulants. -GI consultation. #2: HFrEF:  -chronic condition, no acute exacerbation.  -Echo sept, 2022 shows an Ef of 34%, severely global hypokinesis, grade 2 diastolic dysfunction. #3: Hypertension:  -chronic, fairly stable.  -cont spironolactone and losartan. #4: COPD (not O2 dependent):  -Chronic condition, no acute exacerbation.  -cont, advair/wixela, fluticasone per home dose. Albuterol prn. #5: L3 compression fracture:  -Incidental finding on today's CT scan  -PT/OT eval and treat. Day of dc  Transfer to acute care facliy for access to GI for EGD for upper gi bleed  Cont ppi drip, hgb has dropped from 13 to 9.5 but now stable for 12 hours at this level, no melena, no further hematemesis since ed. Initiate transfer, we usually have GI coverage on Thursday, but GI is not here today. Awaiting transfer to facility with GI  Declined by Elvis Finnegan, as it is not urgent and can wait until Monday  Will give iv iron, cont ppi, allow her to eat. DIET: npo      No current facility-administered medications on file prior to encounter. Current Outpatient Medications on File Prior to Encounter   Medication Sig Dispense Refill    predniSONE (DELTASONE) 10 mg tablet Take 10 mg by mouth daily (with breakfast). acetaminophen (TYLENOL) 325 mg tablet Take 325 mg by mouth every four (4) hours as needed. spironolactone (ALDACTONE) 25 mg tablet Take 12.5 mg by mouth daily. losartan (COZAAR) 25 mg tablet Take 1 Tablet by mouth daily. 30 Tablet 3    cycloSPORINE (RESTASIS) 0.05 % dpet Administer 1 Drop to both eyes every twelve (12) hours. carvediloL (COREG) 25 mg tablet Take 1 Tablet by mouth two (2) times daily (with meals). fluticasone propion-salmeteroL (ADVAIR/WIXELA) 250-50 mcg/dose diskus inhaler Take 1 Puff by inhalation every twelve (12) hours.       albuterol (PROVENTIL VENTOLIN) 2.5 mg /3 mL (0.083 %) nebu Take 2.5 mg by inhalation. fluticasone propionate (FLONASE) 50 mcg/actuation nasal spray 2 sprays by Both Nostrils route nightly. triamcinolone acetonide (KENALOG) 0.1 % topical cream  (Patient not taking: Reported on 11/9/2022)      traMADoL (ULTRAM) 50 mg tablet Take 50 mg by mouth every six (6) hours as needed for Pain. (Patient not taking: Reported on 11/9/2022)      doxepin (SINEquan) 10 mg capsule Take 10 mg by mouth nightly. (Patient not taking: Reported on 11/9/2022)      montelukast (SINGULAIR) 10 mg tablet Take 10 mg by mouth daily. (Patient not taking: Reported on 11/9/2022)      aspirin 81 mg chewable tablet 1 tablet (Patient not taking: Reported on 11/9/2022)             NOTIFY YOUR PHYSICIAN FOR ANY OF THE FOLLOWING:   Fever over 101 degrees for 24 hours. Chest pain, shortness of breath, fever, chills, nausea, vomiting, diarrhea, change in mentation, falling, weakness, bleeding. Severe pain or pain not relieved by medications. Or, any other signs or symptoms that you may have questions about. DISPOSITION: acute care facility    Home With:   OT  PT  HH  RN       Long term SNF/Inpatient Rehab    Independent/assisted living    Hospice    Other:       PHYSICAL EXAMINATION AT DISCHARGE:  General:          Alert, cooperative, no distress, appears stated age. HEENT:           Atraumatic, anicteric sclerae, pink conjunctivae                          No oral ulcers, mucosa moist, throat clear, dentition fair  Neck:               Supple, symmetrical  Lungs:             Clear to auscultation bilaterally. No Wheezing or Rhonchi. No rales. Chest wall:      No tenderness  No Accessory muscle use. Heart:              Regular  rhythm,  No  murmur   No edema  Abdomen:        Soft, non-tender. Not distended. Bowel sounds normal  Extremities:     No cyanosis. No clubbing,                            Skin turgor normal, Capillary refill normal  Skin:                Not pale.   Not Jaundiced  No rashes Psych:             Not anxious or agitated. Neurologic:      Alert, moves all extremities, answers questions appropriately and responds to commands       [address]:  Problem List as of 11/11/2022 Never Reviewed            Codes Class Noted - Resolved    GI bleed ICD-10-CM: K92.2  ICD-9-CM: 578.9  11/9/2022 - Present        Acute GI bleeding ICD-10-CM: K92.2  ICD-9-CM: 578.9  11/9/2022 - Present        NSTEMI (non-ST elevated myocardial infarction) ([facility] ICD-10-CM: I21.4  ICD-9-CM: 410.70  9/3/2022 - Present        Sciatica ICD-10-CM: M54.30  ICD-9-CM: 724.3  9/2/2022 - Present        Diverticulitis ICD-10-CM: K57.92  ICD-9-CM: 562.11  9/2/2022 - Present        Elevated troponin ICD-10-CM: R77.8  ICD-9-CM: 790.6  9/2/2022 - Present        GERD (gastroesophageal reflux disease) ICD-10-CM: K21.9  ICD-9-CM: 530.81  Unknown - Present        Allergic rhinitis ICD-10-CM: J30.9  ICD-9-CM: 477.9  Unknown - Present        Chronic obstructive pulmonary disease ([facility] ICD-10-CM: J44.9  ICD-9-CM: 639  Unknown - Present    Overview Signed 1/29/2015 11:02 AM by Tamara Anderson LPN     mild per pulmonary notes             Arthritis ICD-10-CM: M19.90  ICD-9-CM: 716.90  Unknown - Present    Overview Signed 1/29/2015 11:02 AM by Tamara Anderson LPN     knee - pending surg             Thyroid disease ICD-10-CM: E07.9  ICD-9-CM: 246. 9  Unknown - Present    Overview Signed 1/29/2015 11:02 AM by Tamara Anderson LPN     cyst on thyroid             Asthma ICD-10-CM: J45.909  ICD-9-CM: 493.90  Unknown - Present        Hypertension ICD-10-CM: I10  ICD-9-CM: 401.9  Unknown - Present        Osteoarthrosis, unspecified whether generalized or localized, pelvic region and thigh ICD-10-CM: M16.10  ICD-9-CM: 715.95  10/28/2014 - Present           Greater than 35 minutes were spent with the patient on counseling and coordination of care    Signed:   Carlo Keyes MD  11/11/2022  12:19 PM

## 2022-11-11 NOTE — PROGRESS NOTES
0700:  Bedside shift change report given to LIBBY Dexter RN (oncoming nurse) by Luca Perkins. Ana Grigsby, YOBANY (offgoing nurse). Report included the following information SBAR, Kardex, Intake/Output, MAR, Recent Results, and Cardiac Rhythm NSR .     0730:  Initial assessment complete, see flowsheet. Patient is A&OX4 this morning, expressing some anxiety about being in the hospital and awaiting her EGD. She is cooperative with staff, but would like to be allowed to eat something today if she does not get transferred soon. CBWR.    0900:  Morning medications administered without issue. MD rounded on patient, stating she could be possibly transfer to [facility] upon acceptance. 1100:  Patient resting quietly with her eyes closed at this time. CBWR.    1300:  Patient expressing anxiety. Xanax given per order, see MAR.    1500:  Patient states relief from anxiety after medication administration. [facility] denied acceptance of the patient due to non-urgent status and said it could be postponed until Monday. Diet ordered so that patient could eat tonight. Patient and daughter informed at bedside. 1700:  Patient is resting quietly with her eyes closed at this time. 1900:  Bedside shift change report given to LIZBETH Baker RN (oncoming nurse) by Aaron Leblanc RN (offgoing nurse). Report included the following information SBAR, Kardex, Intake/Output, MAR, Recent Results, and Cardiac Rhythm NSR .

## 2022-11-11 NOTE — PROGRESS NOTES
Orders were received for P.T. Pt currently waiting for transfer to another facility due to GI bleed, discussed with pt and she is agreeable to d/c of order until GI issues are resolved.    Agusto Rebolledo, PT, DPT

## 2022-11-12 LAB
ANION GAP SERPL CALC-SCNC: 7 MMOL/L (ref 3–18)
BASOPHILS # BLD: 0.1 K/UL (ref 0–0.1)
BASOPHILS NFR BLD: 1 % (ref 0–2)
BUN SERPL-MCNC: 16 MG/DL (ref 7–18)
BUN/CREAT SERPL: 17 (ref 12–20)
CA-I BLD-MCNC: 8.2 MG/DL (ref 8.5–10.1)
CHLORIDE SERPL-SCNC: 114 MMOL/L (ref 100–111)
CO2 SERPL-SCNC: 22 MMOL/L (ref 21–32)
CREAT SERPL-MCNC: 0.95 MG/DL (ref 0.6–1.3)
DIFFERENTIAL METHOD BLD: ABNORMAL
EOSINOPHIL # BLD: 0.2 K/UL (ref 0–0.4)
EOSINOPHIL NFR BLD: 2 % (ref 0–5)
ERYTHROCYTE [DISTWIDTH] IN BLOOD BY AUTOMATED COUNT: 18 % (ref 11.6–14.5)
GLUCOSE SERPL-MCNC: 98 MG/DL (ref 74–99)
HCT VFR BLD AUTO: 25.4 % (ref 35–45)
HCT VFR BLD AUTO: 27.4 % (ref 35–45)
HGB BLD-MCNC: 7.8 G/DL (ref 12–16)
HGB BLD-MCNC: 8.4 G/DL (ref 12–16)
IMM GRANULOCYTES # BLD AUTO: 0.1 K/UL (ref 0–0.04)
IMM GRANULOCYTES NFR BLD AUTO: 1 % (ref 0–0.5)
LYMPHOCYTES # BLD: 1.9 K/UL (ref 0.9–3.6)
LYMPHOCYTES NFR BLD: 19 % (ref 21–52)
MCH RBC QN AUTO: 29.7 PG (ref 24–34)
MCH RBC QN AUTO: 29.8 PG (ref 24–34)
MCHC RBC AUTO-ENTMCNC: 30.7 G/DL (ref 31–37)
MCHC RBC AUTO-ENTMCNC: 30.7 G/DL (ref 31–37)
MCV RBC AUTO: 96.6 FL (ref 78–100)
MONOCYTES # BLD: 1.2 K/UL (ref 0.05–1.2)
MONOCYTES NFR BLD: 12 % (ref 3–10)
NEUTS SEG # BLD: 6.8 K/UL (ref 1.8–8)
NEUTS SEG NFR BLD: 65 % (ref 40–73)
NRBC # BLD: 0 K/UL (ref 0–0.01)
NRBC BLD-RTO: 0 PER 100 WBC
PLATELET # BLD AUTO: 294 K/UL (ref 135–420)
PMV BLD AUTO: 10.1 FL (ref 9.2–11.8)
POTASSIUM SERPL-SCNC: 3.4 MMOL/L (ref 3.5–5.5)
RBC # BLD AUTO: 2.63 M/UL (ref 4.2–5.3)
SODIUM SERPL-SCNC: 143 MMOL/L (ref 136–145)
WBC # BLD AUTO: 10.2 K/UL (ref 4.6–13.2)

## 2022-11-12 PROCEDURE — 94761 N-INVAS EAR/PLS OXIMETRY MLT: CPT

## 2022-11-12 PROCEDURE — 74011250637 HC RX REV CODE- 250/637: Performed by: INTERNAL MEDICINE

## 2022-11-12 PROCEDURE — 85018 HEMOGLOBIN: CPT

## 2022-11-12 PROCEDURE — 74011000258 HC RX REV CODE- 258: Performed by: INTERNAL MEDICINE

## 2022-11-12 PROCEDURE — 74011250636 HC RX REV CODE- 250/636: Performed by: NURSE PRACTITIONER

## 2022-11-12 PROCEDURE — 85025 COMPLETE CBC W/AUTO DIFF WBC: CPT

## 2022-11-12 PROCEDURE — 65610000006 HC RM INTENSIVE CARE

## 2022-11-12 PROCEDURE — 80048 BASIC METABOLIC PNL TOTAL CA: CPT

## 2022-11-12 PROCEDURE — 36415 COLL VENOUS BLD VENIPUNCTURE: CPT

## 2022-11-12 PROCEDURE — C9113 INJ PANTOPRAZOLE SODIUM, VIA: HCPCS | Performed by: INTERNAL MEDICINE

## 2022-11-12 PROCEDURE — 74011250636 HC RX REV CODE- 250/636: Performed by: INTERNAL MEDICINE

## 2022-11-12 PROCEDURE — 94640 AIRWAY INHALATION TREATMENT: CPT

## 2022-11-12 PROCEDURE — 74011250637 HC RX REV CODE- 250/637: Performed by: NURSE PRACTITIONER

## 2022-11-12 RX ADMIN — SODIUM CHLORIDE 8 MG/HR: 900 INJECTION INTRAVENOUS at 14:17

## 2022-11-12 RX ADMIN — MOMETASONE FUROATE AND FORMOTEROL FUMARATE DIHYDRATE 2 PUFF: 200; 5 AEROSOL RESPIRATORY (INHALATION) at 19:47

## 2022-11-12 RX ADMIN — SODIUM CHLORIDE 8 MG/HR: 900 INJECTION INTRAVENOUS at 05:38

## 2022-11-12 RX ADMIN — MOMETASONE FUROATE AND FORMOTEROL FUMARATE DIHYDRATE 2 PUFF: 200; 5 AEROSOL RESPIRATORY (INHALATION) at 08:18

## 2022-11-12 RX ADMIN — ALPRAZOLAM 0.5 MG: 0.5 TABLET ORAL at 22:39

## 2022-11-12 RX ADMIN — SODIUM CHLORIDE 50 ML/HR: 9 INJECTION, SOLUTION INTRAVENOUS at 08:57

## 2022-11-12 RX ADMIN — FLUTICASONE PROPIONATE 2 SPRAY: 50 SPRAY, METERED NASAL at 22:36

## 2022-11-12 RX ADMIN — CARVEDILOL 25 MG: 12.5 TABLET, FILM COATED ORAL at 09:03

## 2022-11-12 RX ADMIN — CARVEDILOL 25 MG: 12.5 TABLET, FILM COATED ORAL at 16:39

## 2022-11-12 RX ADMIN — SODIUM CHLORIDE 8 MG/HR: 900 INJECTION INTRAVENOUS at 07:35

## 2022-11-12 RX ADMIN — SODIUM CHLORIDE 8 MG/HR: 900 INJECTION INTRAVENOUS at 08:54

## 2022-11-12 NOTE — ROUTINE PROCESS
Bedside shift change report given to LIZBETH Baker RN (oncoming nurse) by Antionette Gutierrez RN (offgoing nurse). Report included the following information SBAR, Intake/Output, MAR, Recent Results, Med Rec Status, and Quality Measures. 1930  Shift assessment completed. Pt A&Ox4 and denies pain or any GI bleeding. No nausea. VSS. SR with a 1st degree AVB on telemetry. Lungs clear in all lobes and bowel sounds present in all quadrants. Pt denies needs. CBWR. Brijeshstephanie Raangela 2010  Pt OOB independently and had a very small formed BM and urinated. Pt able to get back to bed independently and denies needs. 2145 2200 medications administered except for her Restasis. Pt states that is at home and can have family bring it in tomorrow. Pt does have OTC eye drops in her purse. 2400  2400 VSS. SR with a 1st degree block on telemetry. 0200  Pt lying in bed with her eyes closed. No sign of discomfort. Respirations equal and unlabored. CBWR.     0400  New #20 gauge PIV placed in L FA with good blood return. Blood drawn for AM labs. Oral care and hair care performed by pt.     0545  Pt OOB to the [facility] independently to urinate and have a BM. No Hematochezia noted. 4353  Bedside shift change report given to RONAK Gregory RN (oncoming nurse) by LIZBETH Baker RN (offgoing nurse). Report included the following information SBAR, Intake/Output, MAR, Recent Results, Med Rec Status, and Quality Measures. Lorraine Bonner from the transfer center called and stated there are no beds available at [facility] at this time.

## 2022-11-12 NOTE — PROGRESS NOTES
Problem: Pressure Injury - Risk of  Goal: *Prevention of pressure injury  Description: Document Charli Scale and appropriate interventions in the flowsheet. Outcome: Progressing Towards Goal  Note: Pressure Injury Interventions:  Sensory Interventions: Assess changes in LOC, Check visual cues for pain    Moisture Interventions: Minimize layers, Maintain skin hydration (lotion/cream)    Activity Interventions: Increase time out of bed    Mobility Interventions: HOB 30 degrees or less    Nutrition Interventions: Offer support with meals,snacks and hydration    Friction and Shear Interventions: HOB 30 degrees or less, Minimize layers                Problem: Patient Education: Go to Patient Education Activity  Goal: Patient/Family Education  Outcome: Progressing Towards Goal     Problem: Falls - Risk of  Goal: *Absence of Falls  Description: Document Oswaldo Fall Risk and appropriate interventions in the flowsheet.   Outcome: Progressing Towards Goal  Note: Fall Risk Interventions:  Mobility Interventions: Patient to call before getting OOB         Medication Interventions: Teach patient to arise slowly, Patient to call before getting OOB    Elimination Interventions: Call light in reach, Patient to call for help with toileting needs, Toilet paper/wipes in reach, Toileting schedule/hourly rounds              Problem: Patient Education: Go to Patient Education Activity  Goal: Patient/Family Education  Outcome: Progressing Towards Goal     Problem: Patient Education: Go to Patient Education Activity  Goal: Patient/Family Education  Outcome: Progressing Towards Goal     Problem: Upper and Lower GI Bleed: Day 1  Goal: Off Pathway (Use only if patient is Off Pathway)  Outcome: Progressing Towards Goal  Goal: Activity/Safety  Outcome: Progressing Towards Goal  Goal: Consults, if ordered  Outcome: Progressing Towards Goal  Goal: Diagnostic Test/Procedures  Outcome: Progressing Towards Goal  Goal: Nutrition/Diet  Outcome: Progressing Towards Goal  Goal: Discharge Planning  Outcome: Progressing Towards Goal  Goal: Medications  Outcome: Progressing Towards Goal  Goal: Respiratory  Outcome: Progressing Towards Goal  Goal: Treatments/Interventions/Procedures  Outcome: Progressing Towards Goal  Goal: Psychosocial  Outcome: Progressing Towards Goal  Goal: *Optimal pain control at patient's stated goal  Outcome: Progressing Towards Goal  Goal: *Hemodynamically stable  Outcome: Progressing Towards Goal  Goal: *Demonstrates progressive activity  Outcome: Progressing Towards Goal     Problem: Upper and Lower GI Bleed: Day 2  Goal: Off Pathway (Use only if patient is Off Pathway)  Outcome: Progressing Towards Goal  Goal: Activity/Safety  Outcome: Progressing Towards Goal  Goal: Consults, if ordered  Outcome: Progressing Towards Goal  Goal: Diagnostic Test/Procedures  Outcome: Progressing Towards Goal  Goal: Nutrition/Diet  Outcome: Progressing Towards Goal  Goal: Discharge Planning  Outcome: Progressing Towards Goal  Goal: Medications  Outcome: Progressing Towards Goal  Goal: Respiratory  Outcome: Progressing Towards Goal  Goal: Treatments/Interventions/Procedures  Outcome: Progressing Towards Goal  Goal: Psychosocial  Outcome: Progressing Towards Goal  Goal: *Optimal pain control at patient's stated goal  Outcome: Progressing Towards Goal  Goal: *Hemodynamically stable  Outcome: Progressing Towards Goal  Goal: *Tolerating diet  Outcome: Progressing Towards Goal  Goal: *Demonstrates progressive activity  Outcome: Progressing Towards Goal     Problem: Upper and Lower GI Bleed: Day 3  Goal: Off Pathway (Use only if patient is Off Pathway)  Outcome: Progressing Towards Goal  Goal: Activity/Safety  Outcome: Progressing Towards Goal  Goal: Diagnostic Test/Procedures  Outcome: Progressing Towards Goal  Goal: Nutrition/Diet  Outcome: Progressing Towards Goal  Goal: Discharge Planning  Outcome: Progressing Towards Goal  Goal: Medications  Outcome: Progressing Towards Goal  Goal: Treatments/Interventions/Procedures  Outcome: Progressing Towards Goal  Goal: Psychosocial  Outcome: Progressing Towards Goal     Problem: Upper and Lower GI Bleed:  Discharge Outcomes  Goal: *Hemodynamically stable  Outcome: Progressing Towards Goal  Goal: *Lungs clear or at baseline  Outcome: Progressing Towards Goal  Goal: *Demonstrates independent activity or return to baseline  Outcome: Progressing Towards Goal  Goal: *Pain is controlled to three or less  Outcome: Progressing Towards Goal  Goal: *Verbalizes understanding and describes prescribed diet  Outcome: Progressing Towards Goal  Goal: *Tolerating diet  Outcome: Progressing Towards Goal  Goal: *Verbalizes name, dosage, time, side effects, and number of days to continue medications  Outcome: Progressing Towards Goal  Goal: *Anxiety reduced or absent  Outcome: Progressing Towards Goal  Goal: *Understands and describes signs and symptoms to report to providers(Stroke Metric)  Outcome: Progressing Towards Goal  Goal: *Describes follow-up/return visits to physicians  Outcome: Progressing Towards Goal  Goal: *Describes available resources and support systems  Outcome: Progressing Towards Goal

## 2022-11-12 NOTE — DISCHARGE SUMMARY
Discharge Summary       PATIENT ID: Dasha Navarro  MRN: [id number]   YOB: 1940    DATE OF ADMISSION: 11/9/2022  2:27 PM    DATE OF DISCHARGE: 11/12/22    PRIMARY CARE PROVIDER: Leanna Koo MD     ATTENDING PHYSICIAN: Eliz Carvalho MD  DISCHARGING PROVIDER: Eliz Carvalho MD        CONSULTATIONS: IP CONSULT TO GASTROENTEROLOGY    PROCEDURES/SURGERIES: * No surgery found *    ADMITTING [facility] COURSE:   Dasha Navarro is a 78 y.o. 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 At the time of my evaluation in the ED, patient was observed vomiting small frequent amounts of brown/coffee colored emesis. No chest pain, fevers, chills. No shortness of breath, fatigue or weakness. Patient denies bright red blood in stools, or melena. Patient states she had quit taking aspirin since September. Patient denies any recent EGDs or colonoscopies. Denies alcohol tobacco or illegal drug use. Denies history of liver disease. Daughter reports she is on prednisone for her chronic \"back pain\", which she was recently started couple weeks ago by her PCP. There are no other complaints verbalized. In the ED, CT of the abdomen/pelvis is unremarkable. Hemoglobin is 12, hematocrit is 40. INR is 1. Vital signs are normal, she received Protonix 80 mg IV push and hospitalist was asked to admit. DISCHARGE DIAGNOSES / PLAN:      Assessment & Plan:      #1: Acute GI bleed:  -presents with brown/coffee-ground emesis onset since this morning. admit to inpatient, ICU, monitor on telemetry.  -she is currently hemodynamically stable.  -H&H is 12/40, INR is 1.  -s/p NS bolus and Protonix 80 mg IV in the ED.  -cont Protonix drip.  -gentle hydration with NS at 50 ml/hr  -keep n.p.o. excepts meds  -H/H q 6 hr, type and screen.   -CT abd/pelvis w contrast is unremarkable.  -No aspirin, NSAIDs or anticoagulants. -GI consultation. #2: HFrEF:  -chronic condition, no acute exacerbation.  -Echo sept, 2022 shows an Ef of 34%, severely global hypokinesis, grade 2 diastolic dysfunction. #3: Hypertension:  -chronic, fairly stable.  -cont spironolactone and losartan. #4: COPD (not O2 dependent):  -Chronic condition, no acute exacerbation.  -cont, advair/wixela, fluticasone per home dose. Albuterol prn. #5: L3 compression fracture:  -Incidental finding on today's CT scan  -PT/OT eval and treat. Day of dc  Transfer to acute care facliy for access to GI for EGD for upper gi bleed  Cont ppi drip, hgb has dropped from 13 to 9.5 but now stable for 12 hours at this level, no melena, no further hematemesis since ed. Initiate transfer, we usually have GI coverage on Thursday, but GI is not here today. Awaiting transfer to facility with GI  Declined by Nicholas Cheema, as it is not urgent and can wait until Monday  Will give iv iron, cont ppi, allow her to eat. 11/12- much better spirits today, still no bed, h/h trends down slowly,change h/h to q12h    DIET: npo      No current facility-administered medications on file prior to encounter. Current Outpatient Medications on File Prior to Encounter   Medication Sig Dispense Refill    predniSONE (DELTASONE) 10 mg tablet Take 10 mg by mouth daily (with breakfast). acetaminophen (TYLENOL) 325 mg tablet Take 325 mg by mouth every four (4) hours as needed. spironolactone (ALDACTONE) 25 mg tablet Take 12.5 mg by mouth daily. losartan (COZAAR) 25 mg tablet Take 1 Tablet by mouth daily. 30 Tablet 3    cycloSPORINE (RESTASIS) 0.05 % dpet Administer 1 Drop to both eyes every twelve (12) hours. carvediloL (COREG) 25 mg tablet Take 1 Tablet by mouth two (2) times daily (with meals).       fluticasone propion-salmeteroL (ADVAIR/WIXELA) 250-50 mcg/dose diskus inhaler Take 1 Puff by inhalation every twelve (12) hours. albuterol (PROVENTIL VENTOLIN) 2.5 mg /3 mL (0.083 %) nebu Take 2.5 mg by inhalation. fluticasone propionate (FLONASE) 50 mcg/actuation nasal spray 2 sprays by Both Nostrils route nightly. triamcinolone acetonide (KENALOG) 0.1 % topical cream  (Patient not taking: Reported on 11/9/2022)      traMADoL (ULTRAM) 50 mg tablet Take 50 mg by mouth every six (6) hours as needed for Pain. (Patient not taking: Reported on 11/9/2022)      doxepin (SINEquan) 10 mg capsule Take 10 mg by mouth nightly. (Patient not taking: Reported on 11/9/2022)      montelukast (SINGULAIR) 10 mg tablet Take 10 mg by mouth daily. (Patient not taking: Reported on 11/9/2022)      aspirin 81 mg chewable tablet 1 tablet (Patient not taking: Reported on 11/9/2022)             NOTIFY YOUR PHYSICIAN FOR ANY OF THE FOLLOWING:   Fever over 101 degrees for 24 hours. Chest pain, shortness of breath, fever, chills, nausea, vomiting, diarrhea, change in mentation, falling, weakness, bleeding. Severe pain or pain not relieved by medications. Or, any other signs or symptoms that you may have questions about. DISPOSITION: acute care facility    Home With:   OT  PT  HH  RN       Long term SNF/Inpatient Rehab    Independent/assisted living    Hospice    Other:       PHYSICAL EXAMINATION AT DISCHARGE:  General:          Alert, cooperative, no distress, appears stated age. HEENT:           Atraumatic, anicteric sclerae, pink conjunctivae                          No oral ulcers, mucosa moist, throat clear, dentition fair  Neck:               Supple, symmetrical  Lungs:             Clear to auscultation bilaterally. No Wheezing or Rhonchi. No rales. Chest wall:      No tenderness  No Accessory muscle use. Heart:              Regular  rhythm,  No  murmur   No edema  Abdomen:        Soft, non-tender. Not distended. Bowel sounds normal  Extremities:     No cyanosis.   No clubbing,                            Skin turgor normal, Capillary refill normal  Skin:                Not pale. Not Jaundiced  No rashes   Psych:             Not anxious or agitated. Neurologic:      Alert, moves all extremities, answers questions appropriately and responds to commands       [address]:  Problem List as of 11/12/2022 Never Reviewed            Codes Class Noted - Resolved    GI bleed ICD-10-CM: K92.2  ICD-9-CM: 578.9  11/9/2022 - Present        Acute GI bleeding ICD-10-CM: K92.2  ICD-9-CM: 578.9  11/9/2022 - Present        NSTEMI (non-ST elevated myocardial infarction) ([facility] ICD-10-CM: I21.4  ICD-9-CM: 410.70  9/3/2022 - Present        Sciatica ICD-10-CM: M54.30  ICD-9-CM: 724.3  9/2/2022 - Present        Diverticulitis ICD-10-CM: K57.92  ICD-9-CM: 562.11  9/2/2022 - Present        Elevated troponin ICD-10-CM: R77.8  ICD-9-CM: 790.6  9/2/2022 - Present        GERD (gastroesophageal reflux disease) ICD-10-CM: K21.9  ICD-9-CM: 530.81  Unknown - Present        Allergic rhinitis ICD-10-CM: J30.9  ICD-9-CM: 477.9  Unknown - Present        Chronic obstructive pulmonary disease ([facility] ICD-10-CM: J44.9  ICD-9-CM: 018  Unknown - Present    Overview Signed 1/29/2015 11:02 AM by Patricia Gillespie LPN     mild per pulmonary notes             Arthritis ICD-10-CM: M19.90  ICD-9-CM: 716.90  Unknown - Present    Overview Signed 1/29/2015 11:02 AM by Patricia Gillespie LPN     knee - pending surg             Thyroid disease ICD-10-CM: E07.9  ICD-9-CM: 246. 9  Unknown - Present    Overview Signed 1/29/2015 11:02 AM by Patricia Gillespie LPN     cyst on thyroid             Asthma ICD-10-CM: J45.909  ICD-9-CM: 493.90  Unknown - Present        Hypertension ICD-10-CM: I10  ICD-9-CM: 401.9  Unknown - Present        Osteoarthrosis, unspecified whether generalized or localized, pelvic region and thigh ICD-10-CM: M16.10  ICD-9-CM: 715.95  10/28/2014 - Present           Greater than 35 minutes were spent with the patient on counseling and coordination of care    Signed:   Lorraine Bravo MD  11/12/2022  12:19 PM

## 2022-11-13 ENCOUNTER — ANESTHESIA EVENT (OUTPATIENT)
Dept: ENDOSCOPY | Age: 82
End: 2022-11-13
Payer: MEDICARE

## 2022-11-13 ENCOUNTER — HOSPITAL ENCOUNTER (OUTPATIENT)
Age: 82
Setting detail: OBSERVATION
Discharge: HOME | End: 2022-11-13
Attending: INTERNAL MEDICINE | Admitting: STUDENT IN AN ORGANIZED HEALTH CARE EDUCATION/TRAINING PROGRAM
Payer: MEDICARE

## 2022-11-13 ENCOUNTER — ANESTHESIA (OUTPATIENT)
Dept: ENDOSCOPY | Age: 82
End: 2022-11-13
Payer: MEDICARE

## 2022-11-13 VITALS
RESPIRATION RATE: 18 BRPM | BODY MASS INDEX: 27.8 KG/M2 | DIASTOLIC BLOOD PRESSURE: 62 MMHG | WEIGHT: 152 LBS | SYSTOLIC BLOOD PRESSURE: 110 MMHG | HEART RATE: 80 BPM | OXYGEN SATURATION: 96 % | TEMPERATURE: 98.5 F

## 2022-11-13 VITALS
HEART RATE: 88 BPM | BODY MASS INDEX: 28.8 KG/M2 | DIASTOLIC BLOOD PRESSURE: 82 MMHG | HEIGHT: 62 IN | WEIGHT: 156.5 LBS | SYSTOLIC BLOOD PRESSURE: 145 MMHG | OXYGEN SATURATION: 98 % | TEMPERATURE: 98.2 F | RESPIRATION RATE: 18 BRPM

## 2022-11-13 PROBLEM — I50.20 HEART FAILURE WITH REDUCED EJECTION FRACTION (HCC): Status: ACTIVE | Noted: 2022-11-13

## 2022-11-13 LAB
ABO + RH BLD: NORMAL
ANION GAP SERPL CALC-SCNC: 6 MMOL/L (ref 3–18)
ATRIAL RATE: 77 BPM
BASOPHILS # BLD: 0.1 K/UL (ref 0–0.1)
BASOPHILS NFR BLD: 1 % (ref 0–2)
BLOOD GROUP ANTIBODIES SERPL: NORMAL
BUN SERPL-MCNC: 9 MG/DL (ref 7–18)
BUN/CREAT SERPL: 9 (ref 12–20)
CALCIUM SERPL-MCNC: 8.3 MG/DL (ref 8.5–10.1)
CALCULATED P AXIS, ECG09: 54 DEGREES
CALCULATED R AXIS, ECG10: 28 DEGREES
CALCULATED T AXIS, ECG11: -124 DEGREES
CHLORIDE SERPL-SCNC: 111 MMOL/L (ref 100–111)
CO2 SERPL-SCNC: 23 MMOL/L (ref 21–32)
COVID-19 RAPID TEST, COVR: NOT DETECTED
CREAT SERPL-MCNC: 0.97 MG/DL (ref 0.6–1.3)
DIAGNOSIS, 93000: NORMAL
DIFFERENTIAL METHOD BLD: ABNORMAL
EOSINOPHIL # BLD: 0.1 K/UL (ref 0–0.4)
EOSINOPHIL NFR BLD: 1 % (ref 0–5)
ERYTHROCYTE [DISTWIDTH] IN BLOOD BY AUTOMATED COUNT: 18.3 % (ref 11.6–14.5)
GLUCOSE SERPL-MCNC: 121 MG/DL (ref 74–99)
HCT VFR BLD AUTO: 25.6 % (ref 35–45)
HGB BLD-MCNC: 7.9 G/DL (ref 12–16)
IMM GRANULOCYTES # BLD AUTO: 0.1 K/UL (ref 0–0.04)
IMM GRANULOCYTES NFR BLD AUTO: 1 % (ref 0–0.5)
LYMPHOCYTES # BLD: 1.1 K/UL (ref 0.9–3.6)
LYMPHOCYTES NFR BLD: 10 % (ref 21–52)
MAGNESIUM SERPL-MCNC: 1.9 MG/DL (ref 1.6–2.6)
MCH RBC QN AUTO: 29.2 PG (ref 24–34)
MCHC RBC AUTO-ENTMCNC: 30.9 G/DL (ref 31–37)
MCV RBC AUTO: 94.5 FL (ref 78–100)
MONOCYTES # BLD: 0.5 K/UL (ref 0.05–1.2)
MONOCYTES NFR BLD: 5 % (ref 3–10)
NEUTS SEG # BLD: 8.9 K/UL (ref 1.8–8)
NEUTS SEG NFR BLD: 83 % (ref 40–73)
NRBC # BLD: 0 K/UL (ref 0–0.01)
NRBC BLD-RTO: 0 PER 100 WBC
P-R INTERVAL, ECG05: 224 MS
PLATELET # BLD AUTO: 286 K/UL (ref 135–420)
PMV BLD AUTO: 9.8 FL (ref 9.2–11.8)
POTASSIUM SERPL-SCNC: 3.4 MMOL/L (ref 3.5–5.5)
Q-T INTERVAL, ECG07: 388 MS
QRS DURATION, ECG06: 82 MS
QTC CALCULATION (BEZET), ECG08: 439 MS
RBC # BLD AUTO: 2.71 M/UL (ref 4.2–5.3)
SODIUM SERPL-SCNC: 140 MMOL/L (ref 136–145)
SPECIMEN EXP DATE BLD: NORMAL
VENTRICULAR RATE, ECG03: 77 BPM
WBC # BLD AUTO: 10.7 K/UL (ref 4.6–13.2)

## 2022-11-13 PROCEDURE — 74011000250 HC RX REV CODE- 250: Performed by: NURSE ANESTHETIST, CERTIFIED REGISTERED

## 2022-11-13 PROCEDURE — 76060000032 HC ANESTHESIA 0.5 TO 1 HR: Performed by: INTERNAL MEDICINE

## 2022-11-13 PROCEDURE — 94761 N-INVAS EAR/PLS OXIMETRY MLT: CPT

## 2022-11-13 PROCEDURE — 99236 HOSP IP/OBS SAME DATE HI 85: CPT | Performed by: INTERNAL MEDICINE

## 2022-11-13 PROCEDURE — 00731 ANES UPR GI NDSC PX NOS: CPT | Performed by: ANESTHESIOLOGY

## 2022-11-13 PROCEDURE — G0378 HOSPITAL OBSERVATION PER HR: HCPCS

## 2022-11-13 PROCEDURE — 36415 COLL VENOUS BLD VENIPUNCTURE: CPT

## 2022-11-13 PROCEDURE — 74011250637 HC RX REV CODE- 250/637: Performed by: INTERNAL MEDICINE

## 2022-11-13 PROCEDURE — 85025 COMPLETE CBC W/AUTO DIFF WBC: CPT

## 2022-11-13 PROCEDURE — 65660000004 HC RM CVT STEPDOWN

## 2022-11-13 PROCEDURE — 00731 ANES UPR GI NDSC PX NOS: CPT | Performed by: NURSE ANESTHETIST, CERTIFIED REGISTERED

## 2022-11-13 PROCEDURE — 93005 ELECTROCARDIOGRAM TRACING: CPT

## 2022-11-13 PROCEDURE — 88305 TISSUE EXAM BY PATHOLOGIST: CPT

## 2022-11-13 PROCEDURE — 86900 BLOOD TYPING SEROLOGIC ABO: CPT

## 2022-11-13 PROCEDURE — 74011250636 HC RX REV CODE- 250/636: Performed by: NURSE ANESTHETIST, CERTIFIED REGISTERED

## 2022-11-13 PROCEDURE — 2709999900 HC NON-CHARGEABLE SUPPLY: Performed by: INTERNAL MEDICINE

## 2022-11-13 PROCEDURE — 80048 BASIC METABOLIC PNL TOTAL CA: CPT

## 2022-11-13 PROCEDURE — 74011250637 HC RX REV CODE- 250/637: Performed by: STUDENT IN AN ORGANIZED HEALTH CARE EDUCATION/TRAINING PROGRAM

## 2022-11-13 PROCEDURE — 99100 ANES PT EXTEME AGE<1 YR&>70: CPT | Performed by: ANESTHESIOLOGY

## 2022-11-13 PROCEDURE — C9113 INJ PANTOPRAZOLE SODIUM, VIA: HCPCS | Performed by: STUDENT IN AN ORGANIZED HEALTH CARE EDUCATION/TRAINING PROGRAM

## 2022-11-13 PROCEDURE — 87635 SARS-COV-2 COVID-19 AMP PRB: CPT

## 2022-11-13 PROCEDURE — 74011250636 HC RX REV CODE- 250/636: Performed by: STUDENT IN AN ORGANIZED HEALTH CARE EDUCATION/TRAINING PROGRAM

## 2022-11-13 PROCEDURE — 74011000250 HC RX REV CODE- 250: Performed by: STUDENT IN AN ORGANIZED HEALTH CARE EDUCATION/TRAINING PROGRAM

## 2022-11-13 PROCEDURE — 96374 THER/PROPH/DIAG INJ IV PUSH: CPT

## 2022-11-13 PROCEDURE — 77030008565 HC TBNG SUC IRR ERBE -B: Performed by: INTERNAL MEDICINE

## 2022-11-13 PROCEDURE — 83735 ASSAY OF MAGNESIUM: CPT

## 2022-11-13 PROCEDURE — 2709999900 HC NON-CHARGEABLE SUPPLY

## 2022-11-13 PROCEDURE — 77030019988 HC FCPS ENDOSC DISP BSC -B: Performed by: INTERNAL MEDICINE

## 2022-11-13 PROCEDURE — 76040000007: Performed by: INTERNAL MEDICINE

## 2022-11-13 PROCEDURE — 74011636637 HC RX REV CODE- 636/637: Performed by: STUDENT IN AN ORGANIZED HEALTH CARE EDUCATION/TRAINING PROGRAM

## 2022-11-13 PROCEDURE — 99100 ANES PT EXTEME AGE<1 YR&>70: CPT | Performed by: NURSE ANESTHETIST, CERTIFIED REGISTERED

## 2022-11-13 RX ORDER — CARVEDILOL 25 MG/1
25 TABLET ORAL 2 TIMES DAILY WITH MEALS
Status: DISCONTINUED | OUTPATIENT
Start: 2022-11-13 | End: 2022-11-13 | Stop reason: HOSPADM

## 2022-11-13 RX ORDER — ACETAMINOPHEN 325 MG/1
650 TABLET ORAL
Status: DISCONTINUED | OUTPATIENT
Start: 2022-11-13 | End: 2022-11-13 | Stop reason: HOSPADM

## 2022-11-13 RX ORDER — LIDOCAINE HYDROCHLORIDE 20 MG/ML
INJECTION, SOLUTION EPIDURAL; INFILTRATION; INTRACAUDAL; PERINEURAL AS NEEDED
Status: DISCONTINUED | OUTPATIENT
Start: 2022-11-13 | End: 2022-11-13 | Stop reason: HOSPADM

## 2022-11-13 RX ORDER — PANTOPRAZOLE SODIUM 40 MG/1
40 TABLET, DELAYED RELEASE ORAL
Qty: 30 TABLET | Refills: 2 | Status: SHIPPED | OUTPATIENT
Start: 2022-11-14

## 2022-11-13 RX ORDER — DIPHENHYDRAMINE HYDROCHLORIDE 50 MG/ML
12.5 INJECTION, SOLUTION INTRAMUSCULAR; INTRAVENOUS
Status: CANCELLED | OUTPATIENT
Start: 2022-11-13

## 2022-11-13 RX ORDER — BUDESONIDE AND FORMOTEROL FUMARATE DIHYDRATE 80; 4.5 UG/1; UG/1
2 AEROSOL RESPIRATORY (INHALATION)
Status: DISCONTINUED | OUTPATIENT
Start: 2022-11-13 | End: 2022-11-13

## 2022-11-13 RX ORDER — LOSARTAN POTASSIUM 25 MG/1
25 TABLET ORAL DAILY
Status: DISCONTINUED | OUTPATIENT
Start: 2022-11-13 | End: 2022-11-13 | Stop reason: HOSPADM

## 2022-11-13 RX ORDER — SODIUM CHLORIDE, SODIUM LACTATE, POTASSIUM CHLORIDE, CALCIUM CHLORIDE 600; 310; 30; 20 MG/100ML; MG/100ML; MG/100ML; MG/100ML
75 INJECTION, SOLUTION INTRAVENOUS CONTINUOUS
Status: CANCELLED | OUTPATIENT
Start: 2022-11-13

## 2022-11-13 RX ORDER — ONDANSETRON 2 MG/ML
4 INJECTION INTRAMUSCULAR; INTRAVENOUS
Status: DISCONTINUED | OUTPATIENT
Start: 2022-11-13 | End: 2022-11-13 | Stop reason: HOSPADM

## 2022-11-13 RX ORDER — CYCLOSPORINE 0.5 MG/ML
1 EMULSION OPHTHALMIC EVERY 12 HOURS
Status: DISCONTINUED | OUTPATIENT
Start: 2022-11-13 | End: 2022-11-13 | Stop reason: HOSPADM

## 2022-11-13 RX ORDER — ALBUTEROL SULFATE 0.83 MG/ML
2.5 SOLUTION RESPIRATORY (INHALATION)
Status: DISCONTINUED | OUTPATIENT
Start: 2022-11-13 | End: 2022-11-13 | Stop reason: HOSPADM

## 2022-11-13 RX ORDER — ARFORMOTEROL TARTRATE 15 UG/2ML
15 SOLUTION RESPIRATORY (INHALATION)
Status: DISCONTINUED | OUTPATIENT
Start: 2022-11-13 | End: 2022-11-13 | Stop reason: HOSPADM

## 2022-11-13 RX ORDER — PANTOPRAZOLE SODIUM 40 MG/1
40 TABLET, DELAYED RELEASE ORAL
Status: DISCONTINUED | OUTPATIENT
Start: 2022-11-14 | End: 2022-11-13 | Stop reason: HOSPADM

## 2022-11-13 RX ORDER — BUDESONIDE 0.25 MG/2ML
250 INHALANT ORAL
Status: DISCONTINUED | OUTPATIENT
Start: 2022-11-13 | End: 2022-11-13 | Stop reason: HOSPADM

## 2022-11-13 RX ORDER — SPIRONOLACTONE 25 MG/1
12.5 TABLET ORAL DAILY
Status: DISCONTINUED | OUTPATIENT
Start: 2022-11-13 | End: 2022-11-13 | Stop reason: HOSPADM

## 2022-11-13 RX ORDER — FLUTICASONE PROPIONATE 50 MCG
2 SPRAY, SUSPENSION (ML) NASAL
Status: DISCONTINUED | OUTPATIENT
Start: 2022-11-13 | End: 2022-11-13 | Stop reason: HOSPADM

## 2022-11-13 RX ORDER — FENTANYL CITRATE 50 UG/ML
25 INJECTION, SOLUTION INTRAMUSCULAR; INTRAVENOUS AS NEEDED
Status: CANCELLED | OUTPATIENT
Start: 2022-11-13

## 2022-11-13 RX ORDER — SODIUM CHLORIDE 0.9 % (FLUSH) 0.9 %
5-40 SYRINGE (ML) INJECTION AS NEEDED
Status: DISCONTINUED | OUTPATIENT
Start: 2022-11-13 | End: 2022-11-13 | Stop reason: HOSPADM

## 2022-11-13 RX ORDER — ONDANSETRON 2 MG/ML
4 INJECTION INTRAMUSCULAR; INTRAVENOUS ONCE
Status: CANCELLED | OUTPATIENT
Start: 2022-11-13 | End: 2022-11-13

## 2022-11-13 RX ORDER — PROPOFOL 10 MG/ML
INJECTION, EMULSION INTRAVENOUS AS NEEDED
Status: DISCONTINUED | OUTPATIENT
Start: 2022-11-13 | End: 2022-11-13 | Stop reason: HOSPADM

## 2022-11-13 RX ORDER — PREDNISONE 10 MG/1
10 TABLET ORAL
Status: DISCONTINUED | OUTPATIENT
Start: 2022-11-13 | End: 2022-11-13 | Stop reason: HOSPADM

## 2022-11-13 RX ORDER — NALBUPHINE HYDROCHLORIDE 10 MG/ML
5 INJECTION, SOLUTION INTRAMUSCULAR; INTRAVENOUS; SUBCUTANEOUS
Status: CANCELLED | OUTPATIENT
Start: 2022-11-13

## 2022-11-13 RX ORDER — SODIUM CHLORIDE 0.9 % (FLUSH) 0.9 %
5-40 SYRINGE (ML) INJECTION EVERY 8 HOURS
Status: DISCONTINUED | OUTPATIENT
Start: 2022-11-13 | End: 2022-11-13 | Stop reason: SDUPTHER

## 2022-11-13 RX ORDER — INSULIN LISPRO 100 [IU]/ML
INJECTION, SOLUTION INTRAVENOUS; SUBCUTANEOUS ONCE
Status: CANCELLED | OUTPATIENT
Start: 2022-11-13 | End: 2022-11-13

## 2022-11-13 RX ORDER — POLYETHYLENE GLYCOL 3350 17 G/17G
17 POWDER, FOR SOLUTION ORAL DAILY PRN
Status: DISCONTINUED | OUTPATIENT
Start: 2022-11-13 | End: 2022-11-13 | Stop reason: HOSPADM

## 2022-11-13 RX ORDER — ONDANSETRON 8 MG/1
4 TABLET, ORALLY DISINTEGRATING ORAL
Status: DISCONTINUED | OUTPATIENT
Start: 2022-11-13 | End: 2022-11-13 | Stop reason: HOSPADM

## 2022-11-13 RX ORDER — MAGNESIUM SULFATE 100 %
4 CRYSTALS MISCELLANEOUS AS NEEDED
Status: CANCELLED | OUTPATIENT
Start: 2022-11-13

## 2022-11-13 RX ORDER — SODIUM CHLORIDE 0.9 % (FLUSH) 0.9 %
5-40 SYRINGE (ML) INJECTION EVERY 8 HOURS
Status: DISCONTINUED | OUTPATIENT
Start: 2022-11-13 | End: 2022-11-13 | Stop reason: HOSPADM

## 2022-11-13 RX ORDER — SODIUM CHLORIDE 0.9 % (FLUSH) 0.9 %
5-40 SYRINGE (ML) INJECTION AS NEEDED
Status: CANCELLED | OUTPATIENT
Start: 2022-11-13

## 2022-11-13 RX ORDER — POTASSIUM CHLORIDE 20 MEQ/1
40 TABLET, EXTENDED RELEASE ORAL
Status: COMPLETED | OUTPATIENT
Start: 2022-11-13 | End: 2022-11-13

## 2022-11-13 RX ORDER — SODIUM CHLORIDE 0.9 % (FLUSH) 0.9 %
5-40 SYRINGE (ML) INJECTION EVERY 8 HOURS
Status: CANCELLED | OUTPATIENT
Start: 2022-11-13

## 2022-11-13 RX ADMIN — SODIUM CHLORIDE, PRESERVATIVE FREE 10 ML: 5 INJECTION INTRAVENOUS at 15:46

## 2022-11-13 RX ADMIN — SODIUM CHLORIDE, PRESERVATIVE FREE 10 ML: 5 INJECTION INTRAVENOUS at 09:07

## 2022-11-13 RX ADMIN — CARVEDILOL 25 MG: 25 TABLET, FILM COATED ORAL at 16:00

## 2022-11-13 RX ADMIN — LIDOCAINE HYDROCHLORIDE 20 MG: 20 INJECTION, SOLUTION EPIDURAL; INFILTRATION; INTRACAUDAL; PERINEURAL at 09:48

## 2022-11-13 RX ADMIN — POTASSIUM CHLORIDE 40 MEQ: 1500 TABLET, EXTENDED RELEASE ORAL at 15:46

## 2022-11-13 RX ADMIN — PREDNISONE 10 MG: 10 TABLET ORAL at 08:58

## 2022-11-13 RX ADMIN — SODIUM CHLORIDE 40 MG: 9 INJECTION, SOLUTION INTRAMUSCULAR; INTRAVENOUS; SUBCUTANEOUS at 08:58

## 2022-11-13 RX ADMIN — CARVEDILOL 25 MG: 25 TABLET, FILM COATED ORAL at 08:58

## 2022-11-13 RX ADMIN — PROPOFOL 70 MG: 10 INJECTION, EMULSION INTRAVENOUS at 09:48

## 2022-11-13 RX ADMIN — SODIUM CHLORIDE, PRESERVATIVE FREE 10 ML: 5 INJECTION INTRAVENOUS at 10:51

## 2022-11-13 RX ADMIN — CYCLOSPORINE 1 DROP: 0.5 EMULSION OPHTHALMIC at 10:49

## 2022-11-13 NOTE — ANESTHESIA PREPROCEDURE EVALUATION
Relevant Problems   RESPIRATORY SYSTEM   (+) Asthma   (+) Chronic obstructive pulmonary disease (HCC)      CARDIOVASCULAR   (+) Hypertension   (+) NSTEMI (non-ST elevated myocardial infarction) (HCC)      GASTROINTESTINAL   (+) GERD (gastroesophageal reflux disease)      ENDOCRINE   (+) Arthritis   (+) Osteoarthrosis, unspecified whether generalized or localized, pelvic region and thigh       Anesthetic History   No history of anesthetic complications            Review of Systems / Medical History  Patient summary reviewed, nursing notes reviewed and pertinent labs reviewed    Pulmonary    COPD        Asthma     Comments: Former smoker   Neuro/Psych   Within defined limits           Cardiovascular    Hypertension      CHF    CAD    Exercise tolerance: <4 METS     GI/Hepatic/Renal     GERD           Endo/Other      Hypothyroidism  Arthritis     Other Findings              Physical Exam    Airway  Mallampati: II  TM Distance: 4 - 6 cm  Neck ROM: normal range of motion   Mouth opening: Normal     Cardiovascular  Regular rate and rhythm,  S1 and S2 normal,  no murmur, click, rub, or gallop  Rhythm: regular  Rate: normal         Dental  No notable dental hx       Pulmonary  Breath sounds clear to auscultation               Abdominal  GI exam deferred       Other Findings            Anesthetic Plan    ASA: 4  Anesthesia type: MAC          Induction: Intravenous  Anesthetic plan and risks discussed with: Patient

## 2022-11-13 NOTE — CONSULTS
[facility]    Name:  Yvette Awad  MR#:   [id number]  :  1940  ACCOUNT #:  [de-identified]  DATE OF SERVICE:  2022    REASON FOR CONSULTATION:  Advise opinion regarding bleeding. HISTORY OF PRESENT ILLNESS:  The patient is an 66-year-old female who has been transferred from [facility] for bleeding. She says on Wednesday, she threw up blood. One episode, she also had dark-colored stool. She does take aspirin every other day. Has not had any further bleeding today or last 3 days. No melena. Described some left-sided abdominal pain in the lower quadrant, localized, from sometime. PAST MEDICAL HISTORY:  Remarkable for COPD, GERD. FAMILY HISTORY AND SOCIAL HISTORY:  No GI malignancy in the family. MEDICATIONS:  Have been listed in the chart, reviewed by me. ALLERGIES:  TO ACE INHIBITORS, CEPHALOSPORINS. REVIEW OF SYSTEMS:  No visual or auditory disturbances. No neck pain, chest pain, PND, orthopnea, headache, dizziness, polydipsia, polyuria, icterus, rash or itching. PHYSICAL EXAMINATION:  GENERAL:  The patient is a fairly built female. VITAL SIGNS:  Afebrile, has stable vitals. HEENT:  Head:  Normocephalic, atraumatic. Eyes:  No icterus. NECK:  Supple. LUNGS:  Clear to auscultation. HEART:  Sounds are normal.  ABDOMEN:  Mildly tender in the lower part. No peritoneal signs. EXTREMITIES:  Unremarkable. NEUROLOGIC:  Communicates well. No mood swings. LABORATORIES:  Reviewed. ASSESSMENT AND PLAN:  1. Upper gastrointestinal bleed, unclear cause. We will perform upper endoscopy to decide further prognosis as well as hospital course. Since she has not had bleeding from few days, more than likely peptic ulcer disease must have healed. 2.  Diverticulosis. 3.  Chronic obstructive pulmonary disease. 4.  Anemia. 5.  Hypertension.       MD JAM Munoz/TONEY_SMITHA/AUNDREA_[email or web address]  D:  2022 9:30  T:  2022 14:41  JOB #:  [id number]  CC: Audrey Lemus MD

## 2022-11-13 NOTE — PROGRESS NOTES
Received report from off going nurse at bedside pt up on bedside comode,no problem noted. Assessment once pt went to bed. Denies any problem at this time.

## 2022-11-13 NOTE — PROGRESS NOTES
Pharmacy Note - Therapeutic Interchange    Pulmicort Respules® (budesonide) + Arformoterol 15mcg/2ml Nebulization Solution were therapeutically interchanged for Symbicort® (budesonide/formoterol) per the P&T Committee approved Therapeutic Interchanges Policy. Approved dose conversion listed in table below.     Medication Ordered Preferred Medication Dispensed   Advair Diskus® (fluticasone/salmeterol) 100/50 mcg BID       Pulmicort Respules® (budesonide) 0.25mg/2ml BID  +  Arformoterol 15mcg/2ml BID   Advair HFA® (fluticasone/salmeterol ) 45/21mcg BID    Fluticasone/salmeterol (Airduo Respiclick) 28/69TEH 1 puff BID    Breo Ellipta® (fluticasone/vilanterol) 100/25 mcg daily    Dulera® (mometasone/formoterol) 100/5 mcg 2 puffs BID    Symbicort® (budesonide/formoterol) 80/4.5 mcg BID        Sim Davey PHARMD, Pharmacist  11/13/2022 7:23 AM

## 2022-11-13 NOTE — ROUTINE PROCESS
Bedside shift change report given to LIZBETH Baker RN (oncoming nurse) by XX (offgoing nurse). Report included the following information SBAR, Intake/Output, MAR, Recent Results, Med Rec Status, and Quality Measures. 2100  Pt OOB to the [facility] independently to urinate and have a very small BM. Shift assessment completed at this time due to the priority of another pt. Pt A&Ox4 and denies pain. Lungs clear in all lobes and bowel sounds present. CBWR.     2300  Pt out of bed to the [facility] to urinate and have a smear BM. Pt denies pain or needs. 2200 medication administered. Pt stated she will not have her Restasis until Monday when the family can  from the pharmacy. 0100  Pt lying in bed with eyes closed. No distress or discomfort noted. CBWR.     0330  Called report to John Hdz RN. Report # [phone]. Room assignment is : 8147, step down. Unknown ETA at this time due to another pt leaving first. Will call to advise when pt leaves the unit. Accepting physician is Dr Guera Alcala per Art Jc at the transfer center. Jose Manuel 43 in unit to  pt.     0445  Spoke with Wander Medina and advised her that her mother has left the unit. Room and phone number was given.

## 2022-11-13 NOTE — PROCEDURES
[facility], [address]     Endoscopic Gastroduodenoscopy Procedure Note    Oqruidea White  1940  [id number]    Indication:  Gastrointestinal hemorrhage, unspecified     : Nikolay Funez MD    Referring Provider:  Luna Coyle MD    Anesthesia/Sedation:  MAC anesthesia Propofol      Procedure Details     After infomed consent was obtained for the procedure, with all risks and benefits of procedure explained the patient was taken to the endoscopy suite and placed in the left lateral decubitus position. Following sequential administration of sedation as per above, the endoscope was inserted into the mouth and advanced under direct vision to third portion of the duodenum. A careful inspection was made as the gastroscope was withdrawn, including a retroflexed view of the proximal stomach; findings and interventions are described below. Findings:   Esophagus:Hiatal hernia noted small. Stomach: superficial erosion with healing ulcers. Biopsies taken. No bleeding  Duodenum/jejunum: normal    Therapies:  biopsy of stomach antrum    Specimens:   ID Type Source Tests Collected by Time Destination   1 : antrum bx's Preservative Gastric  Stefany MD Nathan 11/13/2022 2310 Pathology              Complications:   None; patient tolerated the procedure well. EBL:  None. Impression:  Small  hiatal hernia, antrum bx's r/o gastritis, No bleeding seen. Probably ulcer which has healed. Very low likely bush of bleeding. Recommendations:  -Regular diet. , -Follow up with me., -No NSAIDS, -Unclear if aspirin would be of benefit to patient at her age. Can go home after lunch. Will sign off for now.  Call us as needed    Nikolay Funez MD  11/13/2022  9:55 AM

## 2022-11-13 NOTE — DISCHARGE INSTRUCTIONS
Ashlie Bernabe. You came in with a suspected GI bleed. You received a endoscopy which did NOT find a actively bleeding lesion. Your hemoglobin is 7.9 on discharge, please follow up with your doctor within the week and get your labs checked by 11/16. You also have a compression fracture likely due to osteoporosis, you may be a candidate for a bisphosphonate, please talk to your doctor. Per our discussion we have decided to continue blood pressure medications but please discontinue it if you feel light headed or see low blood pressures on your monitor.

## 2022-11-13 NOTE — PERIOP NOTES
TRANSFER - OUT REPORT:    Verbal report given to [facility], RN(name) on [place]  being transferred to 2303(unit) for routine progression of care       Report consisted of patients Situation, Background, Assessment and   Recommendations(SBAR). Information from the following report(s) Procedure Summary was reviewed with the receiving nurse. Lines:   Peripheral IV 11/12/22 Left;Posterior Forearm (Active)   Site Assessment Clean, dry, & intact 11/13/22 0932   Phlebitis Assessment 0 11/13/22 0932   Infiltration Assessment 0 11/13/22 0932   Dressing Status Clean, dry, & intact 11/13/22 0932   Dressing Type Transparent 11/13/22 0932   Hub Color/Line Status Infusing 11/13/22 0932   Action Taken Open ports on tubing capped 11/13/22 0850   Alcohol Cap Used Yes 11/13/22 0850        Opportunity for questions and clarification was provided.       Patient transported with:   Registered Nurse

## 2022-11-13 NOTE — PROGRESS NOTES
0330: [facility] [place] [facility]". Unknown ETA at the moment. RN said she'll call back when pt leaves their unit.     0500: YOBANY Reagan of [facility] called and informed this RN that pt is on her way to [facility].     0550: Pt arrived at the unit via stretcher. Admission assessment done. V/S obtained. T: 98. 9;HR: 85; SpO2: 96%; BP: 145/79. Pt A/Ox4. No complaints of pain or SOB. Dual skin assessment done with YOBANY Gonzalez. No pressure injuries or wound noted. 1 IV site noted on Right FA; flushed; patent. Bathed pt. Gown changed, and electrodes changed. Bedside commode and walker available at the bedside. No other needs expressed. Call light within reach. Wound Prevention Checklist    Patient: Mei Almonte (29 y.o. female)  Date: 11/13/2022  Diagnosis: GI bleed [K92.2] GI bleed    Precautions:         []  Heel prevention boots placed on patient    []  Patient turned q2h during shift    []  Lift team ordered    []  Patient on Sebastian bed/Specialty bed    []  Each Wound is documented during shift (Stage, Color, drainage, odor, measurements, and dressings)    [x]  Dual skin check done with Sami Almaguer RN     8892: MD notified of pt's arrival to the unit. 0630: Pt OOB used the bedside commode. No BM noted at this time. 0444: Blood consent obtained by Dr. Vilma Morales MD    0730: Bedside shift change report given to Luigi Weir 1841 (oncoming nurse) by Julian Hannah (offgoing nurse). Report included the following information SBAR, Kardex, Intake/Output, MAR, Recent Results, and Cardiac Rhythm NSR with 1st degree AV block .

## 2022-11-13 NOTE — ANESTHESIA POSTPROCEDURE EVALUATION
Procedure(s):  ESOPHAGOGASTRODUODENOSCOPY (EGD) with bx's.     MAC    Anesthesia Post Evaluation      Multimodal analgesia: multimodal analgesia used between 6 hours prior to anesthesia start to PACU discharge  Patient location during evaluation: bedside  Patient participation: complete - patient participated  Level of consciousness: awake  Pain management: adequate  Airway patency: patent  Anesthetic complications: no  Cardiovascular status: stable  Respiratory status: acceptable  Hydration status: acceptable  Post anesthesia nausea and vomiting:  controlled  Final Post Anesthesia Temperature Assessment:  Normothermia (36.0-37.5 degrees C)      INITIAL Post-op Vital signs:   Vitals Value Taken Time   /74 11/13/22 1109   Temp 36.9 °C (98.5 °F) 11/13/22 1109   Pulse 72 11/13/22 1109   Resp 18 11/13/22 1109   SpO2 98 % 11/13/22 1109

## 2022-11-13 NOTE — PROGRESS NOTES
Problem: Pressure Injury - Risk of  Goal: *Prevention of pressure injury  Description: Document Charli Scale and appropriate interventions in the flowsheet.   Outcome: Resolved/Met     Problem: Patient Education: Go to Patient Education Activity  Goal: Patient/Family Education  Outcome: Resolved/Met

## 2022-11-13 NOTE — ROUTINE PROCESS
Discharge order noted. PIV removed. Patient received discharged instructions and verbalized understanding. Patient transported via wheelchair to front of [facility] hospital entrance. Patient transported home by daughter.      KIZZY RichmondN, RN

## 2022-11-13 NOTE — PROGRESS NOTES
1013 Received report from [address] [facility], RN (Endo Nurse). Per [address] [facility], RN - Upper endoscopy completed, no active bleeding noted. Patient can have regular diet and discharge to home after lunch. 1313 Patient consumed 100% of lunch.     Raysa Osborn, KIZZYN, RN

## 2022-11-13 NOTE — H&P
History & Physical    Patient: Hung Mayes MRN: [id number]  CSN: [id number]    YOB: 1940  Age: 78 y.o. Sex: female      DOA: 11/13/2022  CC:*From [place] for GI consult, GI bleed    PCP: Divya Rocha MD       HPI:     Hung Mayes is a 78 y.o. female with past medical history significant for HFrEF EF of 34%, hypertension who was transfer from [facility] for management of GI bleed. Patient was admitted to the on 11/9 for hematemesis. Patient was not on aspirin or anticoagulation, she stopped taking aspirin back in September. She has not received any blood transfusion, her last hemoglobin there was 8.4. She was on Protonix drip prior to her transfer here    On evaluation patient feels well she has not had recurrence of hematemesis. Her only complaint is tenderness in her left quadrant. She reported some stools that were black. She had a small bowel movement that was reported as brown earlier today. Review of Systems  GENERAL: No fever, No chill, No malaise   HEENT: No change in vision, no ear ache, tinnitus, no sore throat or sinus congestion. NECK: No pain or stiffness. PULMONARY: No shortness of breath, no cough or wheeze. Cardiovascular: No chest pain/pressure. no pnd / orthopnea  GASTROINTESTINAL: Lower quadrant pain  GENITOURINARY: No urinary frequency, No urgency or pain with urination. MUSCULOSKELETAL: No joint or muscle pain, no back pain, no recent trauma. DERMATOLOGIC: No rash, no itching, no lesions. HEMATOLOGICAL: No easy bruising or bleeding.    NEUROLOGIC: No headache, No seizures, No generalized weakness       Past Medical History:   Diagnosis Date    Allergic rhinitis     Arthritis     knee - pending surg    Asthma     Chronic obstructive pulmonary disease (HCC)     mild per pulmonary notes    GERD (gastroesophageal reflux disease)     Hypertension     Thromboembolus ([address]     many years ago    Thyroid disease     cyst on thyroid       Past Surgical History:   Procedure Laterality Date    HX APPENDECTOMY  age 23    HX HYSTERECTOMY  age 35    HX KNEE REPLACEMENT Left 2011    Dr. Oniel Nunn Left age 23    inguinal       No family history on file. Social History     Socioeconomic History    Marital status:    Tobacco Use    Smoking status: Former     Packs/day: 1.00     Types: Cigarettes    Smokeless tobacco: Never    Tobacco comments:     smoke for appox 18 months at age 23   Substance and Sexual Activity    Alcohol use: Not Currently     Comment: rarely    Drug use: No    Sexual activity: Not Currently       Prior to Admission medications    Medication Sig Start Date End Date Taking? Authorizing Provider   predniSONE (DELTASONE) 10 mg tablet Take 10 mg by mouth daily (with breakfast). Yes Other, MD Kathy   acetaminophen (TYLENOL) 325 mg tablet Take 325 mg by mouth every four (4) hours as needed. Yes Other, MD Kathy   spironolactone (ALDACTONE) 25 mg tablet Take 12.5 mg by mouth daily. 9/9/22  Yes Kathy Clemons MD   losartan (COZAAR) 25 mg tablet Take 1 Tablet by mouth daily. 9/5/22  Yes Meaghan Sanchez MD   cycloSPORINE (RESTASIS) 0.05 % dpet Administer 1 Drop to both eyes every twelve (12) hours. Yes Provider, Historical   carvediloL (COREG) 25 mg tablet Take 1 Tablet by mouth two (2) times daily (with meals). Yes Other, MD Kathy   fluticasone propion-salmeteroL (ADVAIR/WIXELA) 250-50 mcg/dose diskus inhaler Take 1 Puff by inhalation every twelve (12) hours. Yes Other, MD Kathy   albuterol (PROVENTIL VENTOLIN) 2.5 mg /3 mL (0.083 %) nebu Take 2.5 mg by inhalation. Yes Other, MD Kathy   fluticasone propionate (FLONASE) 50 mcg/actuation nasal spray 2 sprays by Both Nostrils route nightly. Yes Provider, Historical   triamcinolone acetonide (KENALOG) 0.1 % topical cream  7/11/22   Other, MD Kathy   traMADoL (ULTRAM) 50 mg tablet Take 50 mg by mouth every six (6) hours as needed for Pain.   Patient not taking: No sig reported    Other, MD Kathy   doxepin (SINEquan) 10 mg capsule Take 10 mg by mouth nightly. Patient not taking: No sig reported    Provider, Historical   montelukast (SINGULAIR) 10 mg tablet Take 10 mg by mouth daily. Patient not taking: No sig reported    Provider, Historical   aspirin 81 mg chewable tablet 1 tablet  Patient not taking: No sig reported    Other, MD Kathy       Allergies   Allergen Reactions    Ace Inhibitors Other (comments)     Not sure, was a long time ago    Ceclor [Cefaclor] Hives and Itching    Cephalosporins Rash and Itching    Ketek [Telithromycin] Hives and Itching            Physical Exam:      Visit Vitals  BP (!) 145/79 (BP 1 Location: Right upper arm, BP Patient Position: At rest)   Pulse 85   Temp 98.9 °F (37.2 °C)   Resp 18   SpO2 96%       Physical Exam:  General:  Alert, cooperative, no distress, appears stated age   HEENT: Normocephalic, atraumatic. Conjunctivae/corneas clear. PERRL. No drainage or sinus tenderness. Supple neck, trachea midline, no adenopathy. Lungs:   Clear to auscultation bilaterally. Heart:  Regular rate and rhythm, S1, S2 normal, no murmur. Palpable distal pulses   Abdomen: Soft, tender in LLQ, not distended. Bowel sounds normal.    Extremities: No edema. Normal cap refill   Pulses: 2+ and symmetric all extremities. Skin: Warm, dry.  No rashes or lesions   Neurologic: AAOx3, No focal motor or sensory deficit   Psych:            Normal affect, insight and judgement  Additional:     Lab/Data Review:  Labs: Results:       Chemistry Recent Labs     11/12/22  0350 11/11/22  0230   GLU 98 81    146*   K 3.4* 3.8   * 115*   CO2 22 22   BUN 16 33*   CREA 0.95 0.99   CA 8.2* 8.5   AGAP 7 9   BUCR 17 33*      CBC w/Diff Recent Labs     11/12/22  1728 11/12/22  0350 11/11/22  2129   WBC  --  10.2  --    RBC  --  2.63*  --    HGB 8.4* 7.8* 9.0*   HCT 27.4* 25.4* 29.2*   PLT  --  294  --    GRANS  --  65  --    LYMPH  --  19*  --    EOS --  2  --       Coagulation No results for input(s): PTP, INR, APTT, INREXT in the last 72 hours. Iron/Ferritin No results for input(s): IRON in the last 72 hours. No lab exists for component: TIBCCALC   BNP No results for input(s): BNPP in the last 72 hours. Cardiac Enzymes No results for input(s): CPK, CKND1, EDGAR in the last 72 hours. No lab exists for component: CKRMB, TROIP   Liver Enzymes No results for input(s): TP, ALB, TBIL, AP in the last 72 hours. No lab exists for component: SGOT, GPT, DBIL   Thyroid Studies No results found for: T4, T3U, TSH, TSHEXT         Imaging Reviewed:  CT from 11/9/2022:  IMPRESSION     1. Mild, acute to subacute superior endplate compression fracture of L3 which  is new in correlation with the prior examination from September. 2.  Colonic diverticulosis without CT evidence of diverticulitis. 3.  Small pericardial effusion, unchanged. 4.  Additional findings, as described above and without significant interval change. .       Assessment:   Principal Problem:    GI bleed (11/9/2022)    Active Problems:    Chronic obstructive pulmonary disease (HCC)       Overview: mild per pulmonary notes      Thyroid disease       Overview: cyst on thyroid      Asthma       Hypertension       Heart failure with reduced ejection fraction ([facility] (11/13/2022)      Plan:   Admit to stepdown unit. Dynamically stable. -Protonix 40 twice daily  -GI consult  -N.p.o.  -H&H every 6hr  -Type and screen ordered  -Blood transfusion consent reviewed and signed with patient    HFrEF: EF of 34% timbre 2022.   Patient euvolemic on exam  -Obtain EKG    Continue other home chronic occasions; temporarily holding her antihypertensive with the exception of carvedilol      Risk of deterioration:  [x]Low    []Moderate  []High     Prophylaxis: Contraindicated, patient admitted for GI bleed     Disposition:  [x]Home w/ Family   []HH PT,OT,RN   []SNF/LTC   []SAH/Rehab     Discussed Code Status: [x]Full Code      []DNR           Point of contact: Daughter Waynard Kanner [phone]  ___________________________________________________     Reason for admission, current diagnosis (or pending work-up), treatment plan including consultations reviewed with:     [x]Patient   []Family    []ED Care Manager  []ED Doc   []Specialist :  Total Time Coordinating Admission:   40  minutes    [x]Total Critical Care Time:   30 mins    [address] [place] [address],   11/13/2022, 7:05 AM

## 2022-11-14 NOTE — DISCHARGE SUMMARY
Discharge Summary    Patient: Hung Mayes MRN: [id number]  CSN: [id number]    YOB: 1940  Age: 78 y.o. Sex: female    DOA: 11/13/2022 LOS:  LOS: 1 day   Discharge Date: 11/13/2022     Admission Diagnosis: GI bleed [K92.2]    Discharge Diagnosis:    Problem List as of 11/13/2022 Date Reviewed: 11/13/2022            Codes Class Noted - Resolved    Heart failure with reduced ejection fraction ([facility] ICD-10-CM: I50.20  ICD-9-CM: 428.20  11/13/2022 - Present        * (Principal) GI bleed ICD-10-CM: K92.2  ICD-9-CM: 578.9  11/9/2022 - Present        Acute GI bleeding ICD-10-CM: K92.2  ICD-9-CM: 578.9  11/9/2022 - Present        NSTEMI (non-ST elevated myocardial infarction) ([facility] ICD-10-CM: I21.4  ICD-9-CM: 410.70  9/3/2022 - Present        Sciatica ICD-10-CM: M54.30  ICD-9-CM: 724.3  9/2/2022 - Present        Diverticulitis ICD-10-CM: K57.92  ICD-9-CM: 562.11  9/2/2022 - Present        Elevated troponin ICD-10-CM: R77.8  ICD-9-CM: 790.6  9/2/2022 - Present        GERD (gastroesophageal reflux disease) ICD-10-CM: K21.9  ICD-9-CM: 530.81  Unknown - Present        Allergic rhinitis ICD-10-CM: J30.9  ICD-9-CM: 477.9  Unknown - Present        Chronic obstructive pulmonary disease ([facility] ICD-10-CM: J44.9  ICD-9-CM: 995  Unknown - Present    Overview Signed 1/29/2015 11:02 AM by Dina Cano LPN     mild per pulmonary notes             Arthritis ICD-10-CM: M19.90  ICD-9-CM: 716.90  Unknown - Present    Overview Signed 1/29/2015 11:02 AM by Dina Cnao LPN     knee - pending surg             Thyroid disease ICD-10-CM: E07.9  ICD-9-CM: 246. 9  Unknown - Present    Overview Signed 1/29/2015 11:02 AM by Dina Cano LPN     cyst on thyroid             Asthma ICD-10-CM: J45.909  ICD-9-CM: 493.90  Unknown - Present        Hypertension ICD-10-CM: I10  ICD-9-CM: 401.9  Unknown - Present        Osteoarthrosis, unspecified whether generalized or localized, pelvic region and thigh ICD-10-CM: M16.10  ICD-9-CM: 715.95  10/28/2014 - Present           Discharge Condition: Stable    PHYSICAL EXAM  Visit Vitals  /62 (BP 1 Location: Right upper arm, BP Patient Position: At rest)   Pulse 80   Temp 98.5 °F (36.9 °C)   Resp 18   Wt 68.9 kg (152 lb)   SpO2 96%   Breastfeeding No   BMI 27.80 kg/m²       General: Alert, cooperative, no acute distress    HEENT: NC, Atraumatic. PERRLA, EOMI. Anicteric sclerae. Lungs:  CTA Bilaterally. No Wheezing/Rhonchi/Rales. Heart:  Regular  rhythm,  No murmur, No Rubs, No Gallops  Abdomen: Soft, Non distended, Non tender. +Bowel sounds, no HSM  Extremities: No c/c/e  Psych:   Good insight. Not anxious or agitated. Neurologic:  CN 2-12 grossly intact, oriented X 3. No acute neurological                                 Deficits,     Hospital Course: Patient is an 78year old female with a pmh of CHF, COPD, HTN, HLD who presented on 11/09/22 for coffee ground emesis. Patient initially at [facility] but GI was unavailable and the patient was transferred to [facility]. Patient was notably not taking aspirin since September. A CT abdomen/pelvis in the ED at [place] [address] was unremarkable. Pateint was put on a Protonix drip. She was also noted to have an incidental L# compression fracture on the Ct. The patient had not had a recent fall. The patient arrived at [facility] and had a EGD performed by GI, they noted a small hiatal hernia and superficial erosions with healing ulcers. No intervention was performed. Patient was then discharged on a PPI and instructed not to take aspirin. The patient's hemoglobin on discharge was 7.9. The patient was instructed to follow up with her primary doctor to get labs drawn this upcoming week and to discuss osteoporosis and her compression fracture. Consults: GI    Significant Diagnostic Studies:   EGD  Findings:   Esophagus:Hiatal hernia noted small. Stomach: superficial erosion with healing ulcers. Biopsies taken.  No bleeding  Duodenum/jejunum: normal    Discharge Medications:   Cannot display discharge medications since this patient is not currently admitted.       Activity: activity as tolerated    Diet: Cardiac Diet    Wound Care: None needed    Follow-up: with PCP, Elie Johnson MD in 7-10days    Minutes spent on discharge: >30 minutes spent coordinating this discharge (review instructions/follow-up, prescriptions, preparing report for sign off)

## 2023-01-15 ENCOUNTER — APPOINTMENT (OUTPATIENT)
Dept: GENERAL RADIOLOGY | Age: 83
End: 2023-01-15
Attending: EMERGENCY MEDICINE
Payer: MEDICARE

## 2023-01-15 ENCOUNTER — HOSPITAL ENCOUNTER (EMERGENCY)
Age: 83
Discharge: HOME | End: 2023-01-15
Attending: EMERGENCY MEDICINE
Payer: MEDICARE

## 2023-01-15 VITALS
WEIGHT: 150 LBS | HEIGHT: 62 IN | TEMPERATURE: 98.1 F | RESPIRATION RATE: 18 BRPM | SYSTOLIC BLOOD PRESSURE: 138 MMHG | BODY MASS INDEX: 27.6 KG/M2 | DIASTOLIC BLOOD PRESSURE: 87 MMHG | HEART RATE: 89 BPM | OXYGEN SATURATION: 97 %

## 2023-01-15 DIAGNOSIS — S93.401A SPRAIN OF RIGHT ANKLE, UNSPECIFIED LIGAMENT, INITIAL ENCOUNTER: Primary | ICD-10-CM

## 2023-01-15 PROCEDURE — 99283 EMERGENCY DEPT VISIT LOW MDM: CPT

## 2023-01-15 PROCEDURE — 73610 X-RAY EXAM OF ANKLE: CPT

## 2023-01-15 PROCEDURE — 74011250637 HC RX REV CODE- 250/637: Performed by: EMERGENCY MEDICINE

## 2023-01-15 RX ORDER — ACETAMINOPHEN 500 MG
500 TABLET ORAL
Status: COMPLETED | OUTPATIENT
Start: 2023-01-15 | End: 2023-01-15

## 2023-01-15 RX ADMIN — ACETAMINOPHEN 500 MG: 500 TABLET ORAL at 11:26

## 2023-01-15 NOTE — ED PROVIDER NOTES
[facility] EMERGENCY DEPT  EMERGENCY DEPARTMENT HISTORY AND PHYSICAL EXAM      Date: 1/15/2023  Patient Name: Wendy Castillo  MRN: [id number]  [place]: 1940  Date of evaluation: 1/15/2023  Provider: Bk Hunt MD   Note Started: 10:12 AM 1/15/23    HISTORY OF PRESENT ILLNESS     Chief Complaint   Patient presents with    Ankle Pain       History Provided By: Patient    HPI: Wendy Castillo, 78 y.o. female PMHx HTN, Knee replacement, COPD, GERD presents with fall and right ankle pain. Sttaes she tripped and fell 10 days ago, hit her left leg but her right ankle has been swelling and painful. It is acute, moderate and does not radiate. She denies head, neck, hip or other injury. She has been using an ankle brace and also called Orthopedist Dr Camilla Chen and was given an appointment for 10 days. She has increased pain with weight bearing. PAST MEDICAL HISTORY   Past Medical History:  Past Medical History:   Diagnosis Date    Allergic rhinitis     Arthritis     knee - pending surg    Asthma     Chronic obstructive pulmonary disease (HCC)     mild per pulmonary notes    GERD (gastroesophageal reflux disease)     Hypertension     Thromboembolus ([address]     many years ago    Thyroid disease     cyst on thyroid       Past Surgical History:  Past Surgical History:   Procedure Laterality Date    HX APPENDECTOMY  age 23    HX HYSTERECTOMY  age 35    HX KNEE REPLACEMENT Left 2011    Dr. Helene Holstein Left age 23    inguinal       Family History:  History reviewed. No pertinent family history. Social History:  Social History     Tobacco Use    Smoking status: Former     Packs/day: 1.00     Types: Cigarettes    Smokeless tobacco: Never    Tobacco comments:     smoke for appox 18 months at age 23   Substance Use Topics    Alcohol use: Not Currently     Comment: rarely    Drug use: No       Allergies:   Allergies   Allergen Reactions    Ace Inhibitors Other (comments)     Not sure, was a long time ago    Ceclor [Cefaclor] Hives and Itching    Cephalosporins Rash and Itching    Ketek [Telithromycin] Hives and Itching       PCP: Rosita Villalta MD    Current Meds:   Previous Medications    ACETAMINOPHEN (TYLENOL) 325 MG TABLET    Take 325 mg by mouth every four (4) hours as needed. ALBUTEROL (PROVENTIL VENTOLIN) 2.5 MG /3 ML (0.083 %) NEBU    Take 2.5 mg by inhalation. CARVEDILOL (COREG) 25 MG TABLET    Take 1 Tablet by mouth two (2) times daily (with meals). FLUTICASONE PROPION-SALMETEROL (ADVAIR/WIXELA) 250-50 MCG/DOSE DISKUS INHALER    Take 1 Puff by inhalation every twelve (12) hours. FLUTICASONE PROPIONATE (FLONASE) 50 MCG/ACTUATION NASAL SPRAY    2 sprays by Both Nostrils route nightly. LOSARTAN (COZAAR) 25 MG TABLET    Take 1 Tablet by mouth daily. PANTOPRAZOLE (PROTONIX) 40 MG TABLET    Take 1 Tablet by mouth Daily (before breakfast). PREDNISONE (DELTASONE) 10 MG TABLET    Take 10 mg by mouth daily (with breakfast). SPIRONOLACTONE (ALDACTONE) 25 MG TABLET    Take 12.5 mg by mouth daily. REVIEW OF SYSTEMS   Review of Systems   Constitutional: Negative. HENT: Negative. Respiratory: Negative. Cardiovascular: Negative. Gastrointestinal: Negative. Genitourinary: Negative. Musculoskeletal:         Right ankle pain   Neurological: Negative. All other systems reviewed and are negative. Positives and Pertinent negatives as per HPI. PHYSICAL EXAM     ED Triage Vitals [01/15/23 1006]   ED Encounter Vitals Group      /87      Pulse (Heart Rate) 89      Resp Rate 18      Temp 98.1 °F (36.7 °C)      Temp src       O2 Sat (%) 97 %      Weight 150 lb      Height 5' 2\"      Physical Exam  Vitals and nursing note reviewed. Constitutional:       Appearance: Normal appearance. HENT:      Head: Normocephalic and atraumatic. Eyes:      Extraocular Movements: Extraocular movements intact. Pupils: Pupils are equal, round, and reactive to light. Cardiovascular:      Rate and Rhythm: Normal rate and regular rhythm. Pulmonary:      Effort: Pulmonary effort is normal.      Breath sounds: Normal breath sounds. Abdominal:      Palpations: Abdomen is soft. Tenderness: There is no abdominal tenderness. Musculoskeletal:      Cervical back: Normal range of motion and neck supple. Comments: Tender swelling right ankle. D Pedis normal   Neurological:      General: No focal deficit present. Mental Status: She is alert and oriented to person, place, and time. SCREENINGS               No data recorded        LAB, EKG AND DIAGNOSTIC RESULTS   Labs:  No results found for this or any previous visit (from the past 12 hour(s)). EKG: Initial EKG interpreted by me. Shows     Radiologic Studies:  Non-plain film images such as CT, Ultrasound and MRI are read by the radiologist. Plain radiographic images are visualized and preliminarily interpreted by the ED Provider with the below findings:    *    Interpretation per the Radiologist below, if available at the time of this note:  XR ANKLE RT MIN 3 V    Result Date: 1/15/2023  EXAM: 3 views right ankle CLINICAL INDICATION/HISTORY: Right ankle pain after fall COMPARISON: None. _______________ FINDINGS: Decreased bone mineral density with no fracture or dislocation identified. Degenerative changes in the midfoot. Posterior and inferior calcaneal spurs. Soft tissue swelling around the ankle. Atherosclerotic calcifications noted. _______________     1. Soft tissue swelling with no acute fracture or dislocation identified. PROCEDURES   Unless otherwise noted below, none.   Performed by: Michael Godwin MD   Procedures      CRITICAL CARE TIME       ED COURSE and DIFFERENTIAL DIAGNOSIS/MDM   Vitals:    Vitals:    01/15/23 1006   BP: 138/87   Pulse: 89   Resp: 18   Temp: 98.1 °F (36.7 °C)   SpO2: 97%   Weight: 68 kg (150 lb)   Height: 5' 2\" (1.575 m)        Patient was given the following medications:  Medications   acetaminophen (TYLENOL) tablet 500 mg (500 mg Oral Given 1/15/23 1126)       CONSULTS: (Who and What was discussed)  None     Chronic Conditions: Arthritis  Social Determinants affecting Dx or Tx: None  Counseling:      Records Reviewed (source and summary of external notes): Nursing Notes    CC/HPI Summary, DDx, ED Course, and Reassessment: Patient with history of fall and twisting right ankle 10 days ago who presented with right ankle painful swelling. Physical exam showed tenderness around ankle with normal D Pedis but painful ROM. Differentials include ankle sprain, fracture and dislocation. Xrays are negative for fracture and dislocation. Patient was given a splint and sent to PCP and Ortho for follow up. Disposition Considerations (Tests not done, Shared Decision Making, Pt Expectation of Test or Treatment.): Patient in agreement with findings and follow up plan     FINAL IMPRESSION     1. Sprain of right ankle, unspecified ligament, initial encounter          DISPOSITION/PLAN   Discharged    Discharge Note: The patient is stable for discharge home. The signs, symptoms, diagnosis, and discharge instructions have been discussed, understanding conveyed, and agreed upon. The patient is to follow up as recommended or return to ER should their symptoms worsen. PATIENT REFERRED TO:  Follow-up Information       Follow up With Specialties Details Why Contact Info    Mignon Willams MD Internal Medicine Physician In 3 days  [address]  [phone]                DISCHARGE MEDICATIONS:  Current Discharge Medication List            DISCONTINUED MEDICATIONS:  Current Discharge Medication List          I am the Primary Clinician of Record: Kitty Oh MD (electronically signed)    (Please note that parts of this dictation were completed with voice recognition software.  Quite often unanticipated grammatical, syntax, homophones, and other interpretive errors are inadvertently transcribed by the computer software. Please disregards these errors.  Please excuse any errors that have escaped final proofreading.)

## 2023-01-15 NOTE — ED TRIAGE NOTES
About one week ago she feel. She had no pain or discomfort after the fall then 4 days ago she started to get pain and swelling in her right ankle. Pain has been getting worse.  Tried using creams, soaks in hot water and an ankle brace without improvement

## 2023-01-17 ENCOUNTER — OFFICE VISIT (OUTPATIENT)
Dept: ORTHOPEDIC SURGERY | Age: 83
End: 2023-01-17
Payer: MEDICARE

## 2023-01-17 VITALS — HEIGHT: 62 IN | WEIGHT: 151 LBS | BODY MASS INDEX: 27.79 KG/M2

## 2023-01-17 DIAGNOSIS — M25.571 RIGHT ANKLE PAIN, UNSPECIFIED CHRONICITY: ICD-10-CM

## 2023-01-17 DIAGNOSIS — S93.401A SPRAIN OF RIGHT ANKLE, UNSPECIFIED LIGAMENT, INITIAL ENCOUNTER: Primary | ICD-10-CM

## 2023-01-17 RX ORDER — ALPRAZOLAM 0.25 MG/1
TABLET ORAL
COMMUNITY
Start: 2022-12-19

## 2023-01-17 RX ORDER — CELECOXIB 200 MG/1
CAPSULE ORAL
COMMUNITY
Start: 2022-10-31

## 2023-01-17 RX ORDER — AMLODIPINE BESYLATE 5 MG/1
TABLET ORAL
COMMUNITY
Start: 2022-12-29

## 2023-01-17 RX ORDER — ALBUTEROL SULFATE 90 UG/1
AEROSOL, METERED RESPIRATORY (INHALATION)
COMMUNITY
Start: 2022-12-29

## 2023-01-17 NOTE — PATIENT INSTRUCTIONS
Ankle Sprain: Care Instructions  Overview     An ankle sprain can happen when you twist your ankle. The ligaments that support the ankle can get stretched and torn. Often the ankle is swollen and painful. Ankle sprains may take from several weeks to several months to heal. Usually, the more pain and swelling you have, the more severe your ankle sprain is and the longer it will take to heal. You can heal faster and regain strength in your ankle with good home treatment. It is very important to give your ankle time to heal completely, so that you do not easily hurt your ankle again. Follow-up care is a key part of your treatment and safety. Be sure to make and go to all appointments, and call your doctor if you are having problems. It's also a good idea to know your test results and keep a list of the medicines you take. How can you care for yourself at home? Prop up your foot on pillows as much as possible for the next 3 days. Try to keep your ankle above the level of your heart. This will help reduce the swelling. Follow your doctor's directions for wearing a splint or elastic bandage. Wrapping the ankle may help reduce or prevent swelling. Your doctor may give you a splint, a brace, an air stirrup, or another form of ankle support to protect your ankle until it is healed. Wear it as directed while your ankle is healing. Do not remove it unless your doctor tells you to. After your ankle has healed, ask your doctor whether you should wear the brace when you exercise. Put ice or cold packs on your injured ankle for 10 to 20 minutes at a time. Try to do this every 1 to 2 hours for the next 3 days (when you are awake) or until the swelling goes down. Put a thin cloth between the ice and your skin. You may need to use crutches until you can walk without pain. If you do use crutches, try to bear some weight on your injured ankle if you can do so without pain.  This helps the ankle heal.  Take pain medicines exactly as directed. If the doctor gave you a prescription medicine for pain, take it as prescribed. If you are not taking a prescription pain medicine, ask your doctor if you can take an over-the-counter medicine. If you have been given ankle exercises to do at home, do them exactly as instructed. These can promote healing and help prevent lasting weakness. When should you call for help? Call your doctor now or seek immediate medical care if:    Your pain is getting worse. Your swelling is getting worse. Your splint feels too tight or you are unable to loosen it. Watch closely for changes in your health, and be sure to contact your doctor if:    You are not getting better after 1 week. Where can you learn more? Go to http://www.gray.com/  Enter D806 in the search box to learn more about \"Ankle Sprain: Care Instructions. \"  Current as of: March 9, 2022               Content Version: 13.4  © 2006-2022 [employer]. Care instructions adapted under license by [employer] (which disclaims liability or warranty for this information). If you have questions about a medical condition or this instruction, always ask your healthcare professional. [place] [address] any warranty or liability for your use of this information.

## 2023-01-17 NOTE — LETTER
1/18/2023    Patient: Josh Roman   YOB: 1940   Date of Visit: 1/17/2023     Cass Canales MD  [address] [address]  Via Fax: [phone]    Dear Cass Canales MD,      Thank you for referring Ms. Josh Roman to [address] [place] [address] for evaluation. My notes for this consultation are attached. If you have questions, please do not hesitate to call me. I look forward to following your patient along with you.       Sincerely,    Sky Reid MD

## 2023-01-17 NOTE — LETTER
RX/DWO/POD  DOS: 1/17/2023  Destiny Bernabe          1940   [facility]#[id number]                        Rommel Rico                                                                                           [facility]# [id number]  Wrist                     Foot/Ankle                         Knee                Wrist Splint            Cam Boot                         Knee Immobilizer    Thumb Spica         Lace Up Ankle Strap       J Sleeve                                  Night Time Splint             T- Scope ROM Brace          Short Runner                                                                           OA Brace   SHOULDER    Sling    Sling w/ Abduction Pillow    ELBOW    Elbow T-Scope ROM Brace    Back    Back Brace    CRUTCHES    Left    Right    __________ Size              IDC 10 Code:____________    I hereby acknowledge receipt of the above listed equipment. I acknowledge that the equipment is in good working order. I have received instructions on safe and proper use of the equipment, including cleaning and maintenance requirements, to my complete satisfaction. I also understand that this product is not able to be returned and is non refundable. I hereby request that payment of authorized Medicare/ third party insurance benefits be made on my behalf of [address] [place] [address] for assigned claims for any authorized equipment/product furnished by [facility].  Having read the foregoing terms and conditions of the agreement on this page , I do hereby agree to be bound thereby.       _______________________________________         ____________________________  Patient/Legal Alireza Craven            Representative Name

## 2023-01-17 NOTE — PROGRESS NOTES
Name: Damien Quintero    : 1940     Service Dept: [address] [place] [address] Medicine    Chief Complaint   Patient presents with    Ankle Pain        Visit Vitals  Ht 5' 2\" (1.575 m)   Wt 151 lb (68.5 kg)   BMI 27.62 kg/m²        Allergies   Allergen Reactions    Ace Inhibitors Other (comments)     Not sure, was a long time ago    Ceclor [Cefaclor] Hives and Itching    Cephalosporins Rash and Itching    Ketek [Telithromycin] Hives and Itching        Current Outpatient Medications   Medication Sig Dispense Refill    ALPRAZolam (XANAX) 0.25 mg tablet       amLODIPine (NORVASC) 5 mg tablet       celecoxib (CELEBREX) 200 mg capsule       albuterol (PROVENTIL HFA, VENTOLIN HFA, PROAIR HFA) 90 mcg/actuation inhaler       pantoprazole (PROTONIX) 40 mg tablet Take 1 Tablet by mouth Daily (before breakfast). 30 Tablet 2    predniSONE (DELTASONE) 10 mg tablet Take 10 mg by mouth daily (with breakfast). acetaminophen (TYLENOL) 325 mg tablet Take 325 mg by mouth every four (4) hours as needed. spironolactone (ALDACTONE) 25 mg tablet Take 12.5 mg by mouth daily. losartan (COZAAR) 25 mg tablet Take 1 Tablet by mouth daily. 30 Tablet 3    carvediloL (COREG) 25 mg tablet Take 1 Tablet by mouth two (2) times daily (with meals). fluticasone propion-salmeteroL (ADVAIR/WIXELA) 250-50 mcg/dose diskus inhaler Take 1 Puff by inhalation every twelve (12) hours. fluticasone propionate (FLONASE) 50 mcg/actuation nasal spray 2 sprays by Both Nostrils route nightly.         Patient Active Problem List   Diagnosis Code    Osteoarthrosis, unspecified whether generalized or localized, pelvic region and thigh M16.10    GERD (gastroesophageal reflux disease) K21.9    Allergic rhinitis J30.9    Chronic obstructive pulmonary disease ([address] J44.9    Arthritis M19.90    Thyroid disease E07.9    Asthma J45.909    Hypertension I10    Sciatica M54.30    Diverticulitis K57.92    Elevated troponin R77.8 NSTEMI (non-ST elevated myocardial infarction) ([facility] I21.4    GI bleed K92.2    Acute GI bleeding K92.2    Heart failure with reduced ejection fraction (HCC) I50.20      History reviewed. No pertinent family history. Social History     Socioeconomic History    Marital status:    Tobacco Use    Smoking status: Former     Packs/day: 1.00     Types: Cigarettes    Smokeless tobacco: Never    Tobacco comments:     smoke for appox 18 months at age 23   Substance and Sexual Activity    Alcohol use: Not Currently     Comment: rarely    Drug use: No    Sexual activity: Not Currently      Past Surgical History:   Procedure Laterality Date    HX APPENDECTOMY  age 23    [de-identified] HYSTERECTOMY  age 35    HX KNEE REPLACEMENT Left 2011    Dr. Jones Patterson Left age 23    inguinal      Past Medical History:   Diagnosis Date    Allergic rhinitis     Arthritis     knee - pending surg    Asthma     Chronic obstructive pulmonary disease ([facility]     mild per pulmonary notes    GERD (gastroesophageal reflux disease)     Hypertension     Thromboembolus ([facility]     many years ago    Thyroid disease     cyst on thyroid        I have reviewed and agree with [address] [place] [address] Nw and ROS and intake form in chart and the record furthermore I have reviewed prior medical record(s) regarding this patients care during this appointment. Review of Systems:   Patient is a pleasant appearing individual, appropriately dressed, well hydrated, well nourished, who is alert, appropriately oriented for age, and in no acute distress with a wheelchair bound gait and normal affect who does not appear to be in any significant pain. Physical Exam:  Right Ankle-Point tenderness to palpation lateral aspect on ankle, Decreased range of motion with flexion and extension, No gross instability, Weakness with plantar flexion, No skin abrasions, Positive for swelling, Grossly neurovascularly intact.     Left Ankle- No point tenderness, Full range of motion, No instability, No Weakness, No, skin lesions, No swelling, Grossly neurovascularly intact. Please note that a DME was provided from our office and fitted to an appropriate size. DME provided will help decrease soft tissue swelling, assist with stabilization, and decrease pain with immobilization. Encounter Diagnoses     ICD-10-CM ICD-9-CM   1. Sprain of right ankle, unspecified ligament, initial encounter  S93.401A 845.00   2. Right ankle pain, unspecified chronicity  M25.571 719.47       HPI:  The patient is here with a chief complaint of right ankle pain, sharp throbbing pain, progressively getting worse. Pain is 10/10. X-rays of the right ankle are unremarkable. Assessment/Plan:  1. Right ankle sprain that has not gotten better. Plan will be for Cam boot, physical therapy. We will see her back in 3 to 4 weeks for routine followup and go from there. As part of continued conservative pain management options the patient was advised to utilize Tylenol or OTC NSAIDS as long as it is not medically contraindicated. Return to Office: Follow-up and Dispositions    Return in about 4 weeks (around 2/14/2023). Scribed by Kaylin Carrillo LPN as dictated by [facility] [place] ALBERT Klein MD.  Documentation, performed by, True and Accepted Hemanth Klein MD

## 2023-02-12 ENCOUNTER — HOSPITAL ENCOUNTER (EMERGENCY)
Age: 83
Discharge: HOME | End: 2023-02-12
Attending: FAMILY MEDICINE
Payer: MEDICARE

## 2023-02-12 VITALS
TEMPERATURE: 97.7 F | OXYGEN SATURATION: 98 % | DIASTOLIC BLOOD PRESSURE: 89 MMHG | BODY MASS INDEX: 27.79 KG/M2 | SYSTOLIC BLOOD PRESSURE: 148 MMHG | HEART RATE: 99 BPM | RESPIRATION RATE: 16 BRPM | WEIGHT: 151 LBS | HEIGHT: 62 IN

## 2023-02-12 DIAGNOSIS — G51.0 BELL'S PALSY: Primary | ICD-10-CM

## 2023-02-12 DIAGNOSIS — N39.0 URINARY TRACT INFECTION IN FEMALE: ICD-10-CM

## 2023-02-12 LAB
APPEARANCE UR: CLEAR
BACTERIA URNS QL MICRO: ABNORMAL /HPF
BILIRUB UR QL: NEGATIVE
COLOR UR: YELLOW
EPITH CASTS URNS QL MICRO: ABNORMAL /LPF (ref 0–20)
GLUCOSE UR STRIP.AUTO-MCNC: NEGATIVE MG/DL
HGB UR QL STRIP: NEGATIVE
KETONES UR QL STRIP.AUTO: ABNORMAL MG/DL
LEUKOCYTE ESTERASE UR QL STRIP.AUTO: ABNORMAL
MUCOUS THREADS URNS QL MICRO: NEGATIVE /LPF
NITRITE UR QL STRIP.AUTO: NEGATIVE
PH UR STRIP: 7.5 (ref 5–8)
PROT UR STRIP-MCNC: 30 MG/DL
RBC #/AREA URNS HPF: ABNORMAL /HPF (ref 0–2)
SP GR UR REFRACTOMETRY: 1.02 (ref 1–1.03)
UROBILINOGEN UR QL STRIP.AUTO: 1 EU/DL (ref 0.2–1)
WBC URNS QL MICRO: ABNORMAL /HPF (ref 0–4)

## 2023-02-12 PROCEDURE — 87086 URINE CULTURE/COLONY COUNT: CPT

## 2023-02-12 PROCEDURE — 99283 EMERGENCY DEPT VISIT LOW MDM: CPT

## 2023-02-12 PROCEDURE — 81001 URINALYSIS AUTO W/SCOPE: CPT

## 2023-02-12 RX ORDER — GRANULES FOR ORAL 3 G/1
3 POWDER ORAL ONCE
Qty: 1 EACH | Refills: 0 | Status: SHIPPED | OUTPATIENT
Start: 2023-02-12 | End: 2023-02-12 | Stop reason: SDUPTHER

## 2023-02-12 RX ORDER — GRANULES FOR ORAL 3 G/1
3 POWDER ORAL ONCE
Qty: 1 EACH | Refills: 1 | Status: SHIPPED | OUTPATIENT
Start: 2023-02-12 | End: 2023-02-12

## 2023-02-12 ASSESSMENT — LIFESTYLE VARIABLES
HOW OFTEN DO YOU HAVE A DRINK CONTAINING ALCOHOL: NEVER
HOW MANY STANDARD DRINKS CONTAINING ALCOHOL DO YOU HAVE ON A TYPICAL DAY: PATIENT DOES NOT DRINK

## 2023-02-12 ASSESSMENT — ENCOUNTER SYMPTOMS: COUGH: 0

## 2023-02-12 NOTE — ED PROVIDER NOTES
[facility] EMERGENCY DEPT  EMERGENCY DEPARTMENT ENCOUNTER      Pt Name: Odell Vigil  MRN: [id number]  [place] 1940  Date of evaluation: 2/12/2023  Provider: Nadine Elise DO    CHIEF COMPLAINT     Droop of the face  Chief Complaint   Patient presents with    Facial Droop         HISTORY OF PRESENT ILLNESS   (Location/Symptom, Timing/Onset, Context/Setting, Quality, Duration, Modifying Factors, Severity)  Note limiting factors. Odell Vigil is a 78 y.o. female who presents to the emergency department     Patient is brought in by family members who state that they noticed some facial droop yesterday. Has not gotten any better. Patient states when she drinks she drools. Has not taken anything for this. Per the family she is on numerous medications. Patient denies any chest pains or shortness of breath has had a decreased appetite. .  Does admit to urinating quite frequently. The history is provided by the patient and a relative. No  was used. Nursing Notes were reviewed. REVIEW OF SYSTEMS    (2-9 systems for level 4, 10 or more for level 5)     Review of Systems   Constitutional:  Positive for appetite change. Negative for fever. HENT:  Positive for drooling. Positive for left facial droop   Respiratory:  Negative for cough. Cardiovascular:  Negative for chest pain. Genitourinary:  Positive for frequency. All other systems reviewed and are negative. Except as noted above the remainder of the review of systems was reviewed and negative.        PAST MEDICAL HISTORY     Past Medical History:   Diagnosis Date    Allergic rhinitis     Arthritis     knee - pending surg    Asthma     Chronic obstructive pulmonary disease ([address]     mild per pulmonary notes    GERD (gastroesophageal reflux disease)     Hypertension     Thromboembolus ([facility]     many years ago    Thyroid disease     cyst on thyroid         SURGICAL HISTORY       Past Surgical History:   Procedure Laterality Date APPENDECTOMY  age 23    HYSTERECTOMY (CERVIX STATUS UNKNOWN)  age 35    NH UNLISTED PROCEDURE ABDOMEN PERITONEUM & OMENTUM Left age 23    inguinal    TOTAL KNEE ARTHROPLASTY Left 2011    Dr. Yolanda Mortimer       Previous Medications    ACETAMINOPHEN (TYLENOL) 325 MG TABLET    Take 325 mg by mouth every 4 hours as needed    ALBUTEROL (PROVENTIL) (2.5 MG/3ML) 0.083% NEBULIZER SOLUTION    Inhale 2.5 mg into the lungs    CARVEDILOL (COREG) 25 MG TABLET    Take 1 tablet by mouth 2 times daily (with meals)    FLUTICASONE (FLONASE) 50 MCG/ACT NASAL SPRAY    2 sprays by Nasal route    LOSARTAN (COZAAR) 25 MG TABLET    Take 25 mg by mouth daily    PANTOPRAZOLE (PROTONIX) 40 MG TABLET    Take 40 mg by mouth every morning (before breakfast)    PREDNISONE (DELTASONE) 10 MG TABLET    Take 10 mg by mouth daily (with breakfast)    SPIRONOLACTONE (ALDACTONE) 25 MG TABLET    Take 12.5 mg by mouth daily       ALLERGIES     Ace inhibitors, Cefaclor, Telithromycin, and Cephalosporins    FAMILY HISTORY     History reviewed. No pertinent family history. SOCIAL HISTORY       Social History     Socioeconomic History    Marital status:      Spouse name: None    Number of children: None    Years of education: None    Highest education level: None   Tobacco Use    Smoking status: Former     Packs/day: 1.00     Types: Cigarettes    Smokeless tobacco: Never   Substance and Sexual Activity    Alcohol use: Not Currently    Drug use: No       SCREENINGS                               CIWA Assessment  BP: (!) 148/89  Heart Rate: 99                 PHYSICAL EXAM    (up to 7 for level 4, 8 or more for level 5)     ED Triage Vitals [02/12/23 1154]   BP Temp Temp Source Heart Rate Resp SpO2 Height Weight   (!) 148/89 97.7 °F (36.5 °C) Oral 99 16 98 % 5' 2\" (1.575 m) 151 lb (68.5 kg)       Physical Exam  Constitutional:       General: She is not in acute distress.      Appearance: She is not ill-appearing, toxic-appearing or diaphoretic. Comments: Obvious left facial droop however not in any distress does not look toxic   HENT:      Head: Normocephalic and atraumatic. Comments: Decreased muscle tone appreciated on the left forehead when compared to the right     Right Ear: Tympanic membrane, ear canal and external ear normal.      Left Ear: Tympanic membrane, ear canal and external ear normal.      Nose: Nose normal.      Mouth/Throat:      Mouth: Mucous membranes are moist.   Eyes:      Extraocular Movements: Extraocular movements intact. Pupils: Pupils are equal, round, and reactive to light. Comments: Left eyelid closes completely but has a lag time when compared to the right   Cardiovascular:      Rate and Rhythm: Normal rate and regular rhythm. Pulses: Normal pulses. Pulmonary:      Effort: Pulmonary effort is normal.   Abdominal:      General: Abdomen is flat. Palpations: Abdomen is soft. Musculoskeletal:         General: Normal range of motion. Cervical back: Normal range of motion and neck supple. Neurological:      Mental Status: She is alert. Comments: Cranial nerve VII slightly sluggish on the left, facial droop appreciated on the left side consistent with Bell's palsy no horizontal or vertical nystagmus is appreciated.        DIAGNOSTIC RESULTS     EKG: All EKG's are interpreted by the Emergency Department Physician who either signs or Co-signs this chart in the absence of a cardiologist.      RADIOLOGY:   Non-plain film images such as CT, Ultrasound and MRI are read by the radiologist. Plain radiographic images are visualized and preliminarily interpreted by the emergency physician with the below findings:      Interpretation per the Radiologist below, if available at the time of this note:    No orders to display         ED BEDSIDE ULTRASOUND:   Performed by ED Physician - none    LABS:  Labs Reviewed   CULTURE, URINE   URINALYSIS WITH MICROSCOPIC       All other labs were within normal range or not returned as of this dictation. EMERGENCY DEPARTMENT COURSE and DIFFERENTIAL DIAGNOSIS/MDM:   Vitals:    Vitals:    02/12/23 1154   BP: (!) 148/89   Pulse: 99   Resp: 16   Temp: 97.7 °F (36.5 °C)   TempSrc: Oral   SpO2: 98%   Weight: 151 lb (68.5 kg)   Height: 5' 2\" (1.575 m)        Medical Decision Making  At this point I do not believe that this is a stroke given that it started yesterday consistent with a Bell's palsy. I do believe that its somewhat mild this was explained to the family members. However I will get a urine on her. Currently afebrile. Offered some steroids as well as antivirals. Family does not want any further medicine given the fact that she is on large amounts now    Amount and/or Complexity of Data Reviewed  Labs: ordered. Risk  Prescription drug management. Urine shows trace of ketones trace leukocyte esterase, +2 bacteria few epithelial cells. REASSESSMENT      Patient stable at this time    CRITICAL CARE TIME   Total Critical Care time was  minutes, excluding separately reportable procedures. There was a high probability of clinically significant/life threatening deterioration in the patient's condition which required my urgent intervention. CONSULTS:  None    PROCEDURES:  Unless otherwise noted below, none     Procedures        FINAL IMPRESSION    No diagnosis found. DISPOSITION/PLAN   DISPOSITION    Discussion with the patient as well as the family I believe that this is Bell's palsy. Her urine may be contaminated. Given her age I think a one-time dose of fosfomycin is not going to hurt anybody. We will send her urine off for culture. I do not believe that this is related to a central lesion. Which is extremely rare. I do believe that this is a mild case of Bell's palsy as she is able to close her eyelids. And she has some forehead involvement.   PATIENT REFERRED TO:  No follow-up provider specified. DISCHARGE MEDICATIONS:  New Prescriptions    No medications on file     Controlled Substances Monitoring:     No flowsheet data found.     (Please note that portions of this note were completed with a voice recognition program.  Efforts were made to edit the dictations but occasionally words are mis-transcribed.)    Lucy Esquivel DO (electronically signed)  Attending Emergency Physician           Lucy Esquivel DO  02/12/23 5632

## 2023-02-12 NOTE — DISCHARGE INSTRUCTIONS
As we spoke, I will write a prescription for some fosfomycin a one-time dose. We will get this sent to the [facility] here in [place]. This is Bell's palsy. I given you some information regarding this Bell's palsy. This is usually nothing more than time. May resolve itself in the next few days in the next few weeks or even the next few months. If she is not able to close her eye then she must tape this eye shut. Follow-up with a primary care doctor return here to the emergency department if is any questions or concerns.

## 2023-02-13 LAB
BACTERIA SPEC CULT: NORMAL
Lab: NORMAL

## 2023-03-31 ENCOUNTER — APPOINTMENT (OUTPATIENT)
Age: 83
End: 2023-03-31
Payer: MEDICARE

## 2023-03-31 ENCOUNTER — HOSPITAL ENCOUNTER (OUTPATIENT)
Age: 83
Setting detail: OBSERVATION
Discharge: ANOTHER HOSPITAL | End: 2023-03-31
Attending: EMERGENCY MEDICINE | Admitting: INTERNAL MEDICINE
Payer: MEDICARE

## 2023-03-31 VITALS
BODY MASS INDEX: 26.31 KG/M2 | WEIGHT: 143 LBS | OXYGEN SATURATION: 98 % | SYSTOLIC BLOOD PRESSURE: 129 MMHG | HEIGHT: 62 IN | RESPIRATION RATE: 18 BRPM | HEART RATE: 93 BPM | TEMPERATURE: 98.8 F | DIASTOLIC BLOOD PRESSURE: 72 MMHG

## 2023-03-31 DIAGNOSIS — K92.2 LOWER GI BLEED: Primary | ICD-10-CM

## 2023-03-31 DIAGNOSIS — I21.4 NSTEMI (NON-ST ELEVATED MYOCARDIAL INFARCTION) (HCC): ICD-10-CM

## 2023-03-31 LAB
ABO + RH BLD: NORMAL
ALBUMIN SERPL-MCNC: 2 G/DL (ref 3.4–5)
ALBUMIN/GLOB SERPL: 0.5 (ref 0.8–1.7)
ALP SERPL-CCNC: 73 U/L (ref 45–117)
ALT SERPL-CCNC: 10 U/L (ref 13–56)
ANION GAP SERPL CALC-SCNC: 9 MMOL/L (ref 3–18)
AST SERPL W P-5'-P-CCNC: 8 U/L (ref 10–38)
BASOPHILS # BLD: 0.1 K/UL (ref 0–0.1)
BASOPHILS # BLD: 0.1 K/UL (ref 0–0.1)
BASOPHILS NFR BLD: 1 % (ref 0–2)
BASOPHILS NFR BLD: 1 % (ref 0–2)
BILIRUB SERPL-MCNC: 0.5 MG/DL (ref 0.2–1)
BLD PROD TYP BPU: NORMAL
BLOOD BANK DISPENSE STATUS: NORMAL
BLOOD GROUP ANTIBODIES SERPL: NEGATIVE
BPU ID: NORMAL
BUN SERPL-MCNC: 7 MG/DL (ref 7–18)
BUN/CREAT SERPL: 8 (ref 12–20)
CA-I BLD-MCNC: 8.5 MG/DL (ref 8.5–10.1)
CHLORIDE SERPL-SCNC: 108 MMOL/L (ref 100–111)
CO2 SERPL-SCNC: 25 MMOL/L (ref 21–32)
CREAT SERPL-MCNC: 0.88 MG/DL (ref 0.6–1.3)
CROSSMATCH RESULT: NORMAL
DIFFERENTIAL METHOD BLD: ABNORMAL
DIFFERENTIAL METHOD BLD: ABNORMAL
EKG ATRIAL RATE: 84 BPM
EKG DIAGNOSIS: NORMAL
EKG P AXIS: -42 DEGREES
EKG P-R INTERVAL: 199 MS
EKG Q-T INTERVAL: 406 MS
EKG QRS DURATION: 86 MS
EKG QTC CALCULATION (BAZETT): 480 MS
EKG R AXIS: 39 DEGREES
EKG T AXIS: 240 DEGREES
EKG VENTRICULAR RATE: 84 BPM
EOSINOPHIL # BLD: 0.2 K/UL (ref 0–0.4)
EOSINOPHIL # BLD: 0.3 K/UL (ref 0–0.4)
EOSINOPHIL NFR BLD: 2 % (ref 0–5)
EOSINOPHIL NFR BLD: 3 % (ref 0–5)
ERYTHROCYTE [DISTWIDTH] IN BLOOD BY AUTOMATED COUNT: 21.3 % (ref 11.6–14.5)
ERYTHROCYTE [DISTWIDTH] IN BLOOD BY AUTOMATED COUNT: 21.3 % (ref 11.6–14.5)
GLOBULIN SER CALC-MCNC: 4.4 G/DL (ref 2–4)
GLUCOSE SERPL-MCNC: 131 MG/DL (ref 74–99)
HCT VFR BLD AUTO: 24 % (ref 35–45)
HCT VFR BLD AUTO: 25.6 % (ref 35–45)
HCT VFR BLD AUTO: 26.4 % (ref 35–45)
HGB BLD-MCNC: 7.4 G/DL (ref 12–16)
HGB BLD-MCNC: 7.8 G/DL (ref 12–16)
HGB BLD-MCNC: 7.9 G/DL (ref 12–16)
IMM GRANULOCYTES # BLD AUTO: 0 K/UL (ref 0–0.04)
IMM GRANULOCYTES # BLD AUTO: 0.1 K/UL (ref 0–0.04)
IMM GRANULOCYTES NFR BLD AUTO: 0 % (ref 0–0.5)
IMM GRANULOCYTES NFR BLD AUTO: 1 % (ref 0–0.5)
INR PPP: 1.3 (ref 0.8–1.2)
LACTATE SERPL-SCNC: 1.3 MMOL/L (ref 0.4–2)
LIPASE SERPL-CCNC: 77 U/L (ref 73–393)
LYMPHOCYTES # BLD: 1.8 K/UL (ref 0.9–3.6)
LYMPHOCYTES # BLD: 1.9 K/UL (ref 0.9–3.6)
LYMPHOCYTES NFR BLD: 16 % (ref 21–52)
LYMPHOCYTES NFR BLD: 16 % (ref 21–52)
MCH RBC QN AUTO: 24.7 PG (ref 24–34)
MCH RBC QN AUTO: 25.9 PG (ref 24–34)
MCH RBC QN AUTO: 26.2 PG (ref 24–34)
MCHC RBC AUTO-ENTMCNC: 29.5 G/DL (ref 31–37)
MCHC RBC AUTO-ENTMCNC: 30.8 G/DL (ref 31–37)
MCHC RBC AUTO-ENTMCNC: 30.9 G/DL (ref 31–37)
MCV RBC AUTO: 83.5 FL (ref 78–100)
MCV RBC AUTO: 83.9 FL (ref 78–100)
MONOCYTES # BLD: 0.7 K/UL (ref 0.05–1.2)
MONOCYTES # BLD: 0.8 K/UL (ref 0.05–1.2)
MONOCYTES NFR BLD: 6 % (ref 3–10)
MONOCYTES NFR BLD: 7 % (ref 3–10)
NEUTS SEG # BLD: 8.1 K/UL (ref 1.8–8)
NEUTS SEG # BLD: 9.2 K/UL (ref 1.8–8)
NEUTS SEG NFR BLD: 73 % (ref 40–73)
NEUTS SEG NFR BLD: 74 % (ref 40–73)
NRBC # BLD: 0 K/UL (ref 0–0.01)
NRBC # BLD: 0 K/UL (ref 0–0.01)
NRBC BLD-RTO: 0 PER 100 WBC
NRBC BLD-RTO: 0 PER 100 WBC
PLATELET # BLD AUTO: 340 K/UL (ref 135–420)
PLATELET # BLD AUTO: 396 K/UL (ref 135–420)
PMV BLD AUTO: 9.1 FL (ref 9.2–11.8)
PMV BLD AUTO: 9.1 FL (ref 9.2–11.8)
POTASSIUM SERPL-SCNC: 3.3 MMOL/L (ref 3.5–5.5)
PROT SERPL-MCNC: 6.4 G/DL (ref 6.4–8.2)
PROTHROMBIN TIME: 16.3 SEC (ref 11.5–15.2)
RBC # BLD AUTO: 2.86 M/UL (ref 4.2–5.3)
RBC # BLD AUTO: 3.16 M/UL (ref 4.2–5.3)
SODIUM SERPL-SCNC: 142 MMOL/L (ref 136–145)
SPECIMEN EXP DATE BLD: NORMAL
TRANSFUSION STATUS PATIENT QL: NORMAL
TROPONIN I SERPL HS-MCNC: 138 NG/L (ref 0–54)
TROPONIN I SERPL HS-MCNC: 150 NG/L (ref 0–54)
UNIT DIVISION: 0
WBC # BLD AUTO: 11 K/UL (ref 4.6–13.2)
WBC # BLD AUTO: 12.3 K/UL (ref 4.6–13.2)

## 2023-03-31 PROCEDURE — P9016 RBC LEUKOCYTES REDUCED: HCPCS

## 2023-03-31 PROCEDURE — G0378 HOSPITAL OBSERVATION PER HR: HCPCS

## 2023-03-31 PROCEDURE — 85025 COMPLETE CBC W/AUTO DIFF WBC: CPT

## 2023-03-31 PROCEDURE — 6360000002 HC RX W HCPCS: Performed by: EMERGENCY MEDICINE

## 2023-03-31 PROCEDURE — 96374 THER/PROPH/DIAG INJ IV PUSH: CPT

## 2023-03-31 PROCEDURE — 93005 ELECTROCARDIOGRAM TRACING: CPT | Performed by: EMERGENCY MEDICINE

## 2023-03-31 PROCEDURE — 74174 CTA ABD&PLVS W/CONTRAST: CPT

## 2023-03-31 PROCEDURE — 6360000004 HC RX CONTRAST MEDICATION: Performed by: EMERGENCY MEDICINE

## 2023-03-31 PROCEDURE — 86900 BLOOD TYPING SEROLOGIC ABO: CPT

## 2023-03-31 PROCEDURE — C9113 INJ PANTOPRAZOLE SODIUM, VIA: HCPCS | Performed by: EMERGENCY MEDICINE

## 2023-03-31 PROCEDURE — 36415 COLL VENOUS BLD VENIPUNCTURE: CPT

## 2023-03-31 PROCEDURE — 99285 EMERGENCY DEPT VISIT HI MDM: CPT

## 2023-03-31 PROCEDURE — 85610 PROTHROMBIN TIME: CPT

## 2023-03-31 PROCEDURE — 83690 ASSAY OF LIPASE: CPT

## 2023-03-31 PROCEDURE — 80053 COMPREHEN METABOLIC PANEL: CPT

## 2023-03-31 PROCEDURE — 85014 HEMATOCRIT: CPT

## 2023-03-31 PROCEDURE — 83605 ASSAY OF LACTIC ACID: CPT

## 2023-03-31 PROCEDURE — 84484 ASSAY OF TROPONIN QUANT: CPT

## 2023-03-31 RX ORDER — PANTOPRAZOLE SODIUM 40 MG/10ML
40 INJECTION, POWDER, LYOPHILIZED, FOR SOLUTION INTRAVENOUS DAILY
Status: DISCONTINUED | OUTPATIENT
Start: 2023-03-31 | End: 2023-03-31

## 2023-03-31 RX ORDER — SODIUM CHLORIDE 9 MG/ML
INJECTION, SOLUTION INTRAVENOUS PRN
Status: DISCONTINUED | OUTPATIENT
Start: 2023-03-31 | End: 2023-04-01 | Stop reason: HOSPADM

## 2023-03-31 RX ORDER — PANTOPRAZOLE SODIUM 40 MG/10ML
40 INJECTION, POWDER, LYOPHILIZED, FOR SOLUTION INTRAVENOUS 2 TIMES DAILY
Status: DISCONTINUED | OUTPATIENT
Start: 2023-03-31 | End: 2023-03-31

## 2023-03-31 RX ORDER — SODIUM CHLORIDE 9 MG/ML
INJECTION, SOLUTION INTRAVENOUS CONTINUOUS
Status: DISCONTINUED | OUTPATIENT
Start: 2023-03-31 | End: 2023-03-31

## 2023-03-31 RX ORDER — AMLODIPINE BESYLATE 5 MG/1
5 TABLET ORAL DAILY
Status: ON HOLD | COMMUNITY
End: 2023-04-04 | Stop reason: HOSPADM

## 2023-03-31 RX ORDER — ALPRAZOLAM 0.25 MG/1
0.25 TABLET ORAL 3 TIMES DAILY PRN
Status: ON HOLD | COMMUNITY
End: 2023-04-01 | Stop reason: DRUGHIGH

## 2023-03-31 RX ORDER — MIDAZOLAM HYDROCHLORIDE 1 MG/ML
1 INJECTION INTRAMUSCULAR; INTRAVENOUS
Status: DISCONTINUED | OUTPATIENT
Start: 2023-03-31 | End: 2023-03-31

## 2023-03-31 RX ORDER — MONTELUKAST SODIUM 10 MG/1
10 TABLET ORAL NIGHTLY
COMMUNITY

## 2023-03-31 RX ORDER — COLCHICINE 0.6 MG/1
0.6 TABLET ORAL DAILY
COMMUNITY

## 2023-03-31 RX ORDER — DOXYCYCLINE HYCLATE 50 MG/1
324 CAPSULE, GELATIN COATED ORAL
Status: ON HOLD | COMMUNITY
End: 2023-04-04 | Stop reason: SDUPTHER

## 2023-03-31 RX ADMIN — PANTOPRAZOLE SODIUM 40 MG: 40 INJECTION, POWDER, FOR SOLUTION INTRAVENOUS at 15:11

## 2023-03-31 RX ADMIN — IOPAMIDOL 96 ML: 755 INJECTION, SOLUTION INTRAVENOUS at 13:50

## 2023-03-31 ASSESSMENT — PAIN - FUNCTIONAL ASSESSMENT
PAIN_FUNCTIONAL_ASSESSMENT: NONE - DENIES PAIN

## 2023-03-31 ASSESSMENT — LIFESTYLE VARIABLES
HOW MANY STANDARD DRINKS CONTAINING ALCOHOL DO YOU HAVE ON A TYPICAL DAY: PATIENT DOES NOT DRINK
HOW OFTEN DO YOU HAVE A DRINK CONTAINING ALCOHOL: NEVER

## 2023-03-31 NOTE — ED PROVIDER NOTES
[facility] EMERGENCY DEPT  EMERGENCY DEPARTMENT ENCOUNTER      Pt Name: Stephanie Potter  MRN: [id number]  [place] 1940  Date of evaluation: 3/31/2023  Provider: Galina Vidal MD    [address] [place] [address]       Chief Complaint   Patient presents with    Rectal Bleeding         HISTORY OF PRESENT ILLNESS   (Location/Symptom, Timing/Onset, Context/Setting, Quality, Duration, Modifying Factors, Severity)  Note limiting factors. Stephanie Potter is a 78 y.o. female with past medical history significant for hypertension, high cholesterol who presents to the emergency department for painless bright red blood per rectum since last night. Gradual onset and intermittent episodes. Not on current antiplatelet therapy after a semirecent perforated gastric ulcer this past fall 2022. No clear aggravating factors. No change to the character or severity. The history is provided by the patient, a relative and medical records. Nursing Notes were reviewed. REVIEW OF SYSTEMS    (2-9 systems for level 4, 10 or more for level 5)     Review of Systems   All other systems reviewed and are negative. Except as noted above the remainder of the review of systems was reviewed and negative.        PAST MEDICAL HISTORY     Past Medical History:   Diagnosis Date    Allergic rhinitis     Arthritis     knee - pending surg    Asthma     CHF (congestive heart failure) ([address]     Chronic obstructive pulmonary disease (HCC)     mild per pulmonary notes    Diverticulitis large intestine     GERD (gastroesophageal reflux disease)     Hypertension     Thromboembolus ([address]     many years ago    Thyroid disease     cyst on thyroid         SURGICAL HISTORY       Past Surgical History:   Procedure Laterality Date    APPENDECTOMY  age 23    HYSTERECTOMY (CERVIX STATUS UNKNOWN)  age 35    NH UNLISTED PROCEDURE ABDOMEN PERITONEUM & OMENTUM Left age 23    inguinal    TOTAL KNEE ARTHROPLASTY Left 2011    Dr. Miles Bonilla       Previous Medications ACETAMINOPHEN (TYLENOL) 325 MG TABLET    Take 325 mg by mouth every 4 hours as needed    ALBUTEROL (PROVENTIL) (2.5 MG/3ML) 0.083% NEBULIZER SOLUTION    Inhale 2.5 mg into the lungs    ALPRAZOLAM (XANAX) 0.25 MG TABLET    Take 0.25 mg by mouth 3 times daily as needed for Anxiety. AMLODIPINE (NORVASC) 5 MG TABLET    Take 5 mg by mouth daily    CARVEDILOL (COREG) 25 MG TABLET    Take 1 tablet by mouth 2 times daily (with meals)    COLCHICINE (COLCRYS) 0.6 MG TABLET    Take 0.6 mg by mouth daily    FERROUS GLUCONATE (FERGON) 324 (38 FE) MG TABLET    Take 324 mg by mouth daily (with breakfast)    FLUTICASONE (FLONASE) 50 MCG/ACT NASAL SPRAY    2 sprays by Nasal route    LOSARTAN (COZAAR) 25 MG TABLET    Take 25 mg by mouth daily    MONTELUKAST (SINGULAIR) 10 MG TABLET    Take 10 mg by mouth nightly    PANTOPRAZOLE (PROTONIX) 40 MG TABLET    Take 40 mg by mouth every morning (before breakfast)    PREDNISONE (DELTASONE) 10 MG TABLET    Take 10 mg by mouth daily (with breakfast)    SPIRONOLACTONE (ALDACTONE) 25 MG TABLET    Take 12.5 mg by mouth daily       ALLERGIES     Ace inhibitors, Cefaclor, Telithromycin, and Cephalosporins    FAMILY HISTORY     History reviewed. No pertinent family history. SOCIAL HISTORY       Social History     Socioeconomic History    Marital status:       Spouse name: None    Number of children: None    Years of education: None    Highest education level: None   Tobacco Use    Smoking status: Former     Packs/day: 1.00     Types: Cigarettes    Smokeless tobacco: Never   Substance and Sexual Activity    Alcohol use: Not Currently    Drug use: No    Sexual activity: Not Currently       SCREENINGS         [place] Coma Scale  Eye Opening: Spontaneous  Best Verbal Response: Oriented  Best Motor Response: Obeys commands  [place] Coma Scale Score: 15                     CIWA Assessment  BP: 118/69  Heart Rate: 89                 PHYSICAL EXAM    (up to 7 for level 4, 8 or more for

## 2023-03-31 NOTE — PROGRESS NOTES
[phone]      Brief note based on chart review, full consult to follow    Pt currently at [facility] ED awaiting transfer to [facility]. Consulted GI for GI bleed per rectum with bright red blood and clots, large volume. Will follow pt once at [facility] to evaluate for possible colonoscopy, will stratify risk/benefit given pt's age and overall medical stability. Hgb 7.8 (baseline appears to be 8-9)  BUN/Creat ratio 8  INR 1.3    No anticoagulation hx seen on chart review    CT today: Sigmoid colonic wall thickening and colonic wall thickening at the level of the hepatic flexure with extensive colonic diverticulosis. - Infectious/inflammatory  - Colitis is a differential consideration. 11/2022 EGD: small HH, superficial erosion with healing ulcers in stomach, no bleeding, H. Pylori neg. ROBERTH NagyC    [facility]  [email or web address]  Phone: [phone]

## 2023-03-31 NOTE — PROGRESS NOTES
INTERIM UPDATE - 1500 EST on 3/31/2023    [facility] ER Physician calls requesting admission for an 78-year-old female who presents with complaint of Rectal Bleeding with Bright Red Blood Per Rectum this AM with clots. Patient had a previous episode in 11/2022 with last Hgb 7.9 g/dL---today Patient is 7.8 g/dL with gina blood in BM witnessed by [facility] ER Physician. Patient's SBP was noted to have fallen 30 mm Hg and DBP 10 mm Hg between the only two Blood Pressure available for this Clinician to review. Patient reportedly has a history of PUD that has bled. Patient is on no anticoagulation at this time. Patient does report fatigue today. Additionally, additional Troponin is elevated at 150 ng/L with no previously history of Elevated Troponins. This is thought due to Demand Ischemia. [facility] does not have Gastroenterological services available until Tuesday (4/04/2023), which necessitated this Transfer. [facility] ER Physician has reportedly already spoken to Gastroenterological services at [facility]. Plan:  Recommended that Patient be given 1 unit PRBCs with H&H check 1-2 hours after completion of infusion with checks q6hrs if Transfer takes longer than 8 hours. Further recommended that Patient's Troponins are checked at this times as well to trend. Recommended that Patient be administered IV PPI therapy due to history of bleeding PUD, despite clinical picture of lower GI Bleed. Will admit Patient to [facility] Unit for management of Symptomatic Anemia 2°/2 (Lower?) GI Bleed.

## 2023-03-31 NOTE — PROGRESS NOTES
At the time of my evaluation of the patient in the ED, a bed is ready for her at [facility], she will be transferred to [facility] under  hospitalist Dr. Milagros Saenz.

## 2023-04-01 ENCOUNTER — HOSPITAL ENCOUNTER (INPATIENT)
Facility: HOSPITAL | Age: 83
LOS: 3 days | Discharge: HOME | DRG: 378 | End: 2023-04-04
Attending: INTERNAL MEDICINE | Admitting: STUDENT IN AN ORGANIZED HEALTH CARE EDUCATION/TRAINING PROGRAM
Payer: MEDICARE

## 2023-04-01 DIAGNOSIS — I21.4 NSTEMI (NON-ST ELEVATED MYOCARDIAL INFARCTION) (HCC): Primary | ICD-10-CM

## 2023-04-01 DIAGNOSIS — I50.42 CHRONIC COMBINED SYSTOLIC AND DIASTOLIC CONGESTIVE HEART FAILURE (HCC): ICD-10-CM

## 2023-04-01 PROBLEM — I50.40 COMBINED SYSTOLIC AND DIASTOLIC CONGESTIVE HEART FAILURE (HCC): Status: ACTIVE | Noted: 2023-04-01

## 2023-04-01 PROBLEM — K92.2 GI BLEED: Status: ACTIVE | Noted: 2022-11-09

## 2023-04-01 PROBLEM — K92.2 ACUTE GI BLEEDING: Status: ACTIVE | Noted: 2022-11-09

## 2023-04-01 PROBLEM — R22.41 LOCALIZED SWELLING OF RIGHT LOWER EXTREMITY: Status: ACTIVE | Noted: 2023-04-01

## 2023-04-01 PROBLEM — I50.20 HEART FAILURE WITH REDUCED EJECTION FRACTION (HCC): Status: ACTIVE | Noted: 2022-11-13

## 2023-04-01 LAB
ANION GAP SERPL CALC-SCNC: 6 MMOL/L (ref 3–18)
BASOPHILS # BLD: 0.1 K/UL (ref 0–0.1)
BASOPHILS NFR BLD: 1 % (ref 0–2)
BUN SERPL-MCNC: 8 MG/DL (ref 7–18)
BUN/CREAT SERPL: 9 (ref 12–20)
CALCIUM SERPL-MCNC: 8.4 MG/DL (ref 8.5–10.1)
CHLORIDE SERPL-SCNC: 112 MMOL/L (ref 100–111)
CO2 SERPL-SCNC: 24 MMOL/L (ref 21–32)
CREAT SERPL-MCNC: 0.88 MG/DL (ref 0.6–1.3)
DIFFERENTIAL METHOD BLD: ABNORMAL
EOSINOPHIL # BLD: 0.6 K/UL (ref 0–0.4)
EOSINOPHIL NFR BLD: 7 % (ref 0–5)
ERYTHROCYTE [DISTWIDTH] IN BLOOD BY AUTOMATED COUNT: 20.7 % (ref 11.6–14.5)
GLUCOSE SERPL-MCNC: 82 MG/DL (ref 74–99)
HCT VFR BLD AUTO: 22.4 % (ref 35–45)
HCT VFR BLD AUTO: 23.3 % (ref 35–45)
HCT VFR BLD AUTO: 23.6 % (ref 35–45)
HGB BLD-MCNC: 7 G/DL (ref 12–16)
HGB BLD-MCNC: 7.2 G/DL (ref 12–16)
HGB BLD-MCNC: 7.3 G/DL (ref 12–16)
IMM GRANULOCYTES # BLD AUTO: 0 K/UL (ref 0–0.04)
IMM GRANULOCYTES NFR BLD AUTO: 1 % (ref 0–0.5)
LYMPHOCYTES # BLD: 2.1 K/UL (ref 0.9–3.6)
LYMPHOCYTES NFR BLD: 25 % (ref 21–52)
MAGNESIUM SERPL-MCNC: 2.1 MG/DL (ref 1.6–2.6)
MCH RBC QN AUTO: 26.4 PG (ref 24–34)
MCHC RBC AUTO-ENTMCNC: 30.9 G/DL (ref 31–37)
MCV RBC AUTO: 85.2 FL (ref 78–100)
MONOCYTES # BLD: 0.9 K/UL (ref 0.05–1.2)
MONOCYTES NFR BLD: 10 % (ref 3–10)
NEUTS SEG # BLD: 4.8 K/UL (ref 1.8–8)
NEUTS SEG NFR BLD: 57 % (ref 40–73)
NRBC # BLD: 0 K/UL (ref 0–0.01)
NRBC BLD-RTO: 0 PER 100 WBC
PLATELET # BLD AUTO: 301 K/UL (ref 135–420)
PMV BLD AUTO: 9.4 FL (ref 9.2–11.8)
POTASSIUM SERPL-SCNC: 3.4 MMOL/L (ref 3.5–5.5)
RBC # BLD AUTO: 2.77 M/UL (ref 4.2–5.3)
SODIUM SERPL-SCNC: 142 MMOL/L (ref 136–145)
WBC # BLD AUTO: 8.4 K/UL (ref 4.6–13.2)

## 2023-04-01 PROCEDURE — G0378 HOSPITAL OBSERVATION PER HR: HCPCS

## 2023-04-01 PROCEDURE — 2140000001 HC CVICU INTERMEDIATE R&B

## 2023-04-01 PROCEDURE — 94640 AIRWAY INHALATION TREATMENT: CPT

## 2023-04-01 PROCEDURE — 1100000000 HC RM PRIVATE

## 2023-04-01 PROCEDURE — A4216 STERILE WATER/SALINE, 10 ML: HCPCS | Performed by: STUDENT IN AN ORGANIZED HEALTH CARE EDUCATION/TRAINING PROGRAM

## 2023-04-01 PROCEDURE — 6360000002 HC RX W HCPCS: Performed by: STUDENT IN AN ORGANIZED HEALTH CARE EDUCATION/TRAINING PROGRAM

## 2023-04-01 PROCEDURE — 6370000000 HC RX 637 (ALT 250 FOR IP): Performed by: STUDENT IN AN ORGANIZED HEALTH CARE EDUCATION/TRAINING PROGRAM

## 2023-04-01 PROCEDURE — 85014 HEMATOCRIT: CPT

## 2023-04-01 PROCEDURE — 6370000000 HC RX 637 (ALT 250 FOR IP): Performed by: INTERNAL MEDICINE

## 2023-04-01 PROCEDURE — 96374 THER/PROPH/DIAG INJ IV PUSH: CPT

## 2023-04-01 PROCEDURE — 80048 BASIC METABOLIC PNL TOTAL CA: CPT

## 2023-04-01 PROCEDURE — 85025 COMPLETE CBC W/AUTO DIFF WBC: CPT

## 2023-04-01 PROCEDURE — C9113 INJ PANTOPRAZOLE SODIUM, VIA: HCPCS | Performed by: STUDENT IN AN ORGANIZED HEALTH CARE EDUCATION/TRAINING PROGRAM

## 2023-04-01 PROCEDURE — 99232 SBSQ HOSP IP/OBS MODERATE 35: CPT | Performed by: INTERNAL MEDICINE

## 2023-04-01 PROCEDURE — 99223 1ST HOSP IP/OBS HIGH 75: CPT | Performed by: STUDENT IN AN ORGANIZED HEALTH CARE EDUCATION/TRAINING PROGRAM

## 2023-04-01 PROCEDURE — 83735 ASSAY OF MAGNESIUM: CPT

## 2023-04-01 PROCEDURE — 2580000003 HC RX 258: Performed by: STUDENT IN AN ORGANIZED HEALTH CARE EDUCATION/TRAINING PROGRAM

## 2023-04-01 PROCEDURE — 36415 COLL VENOUS BLD VENIPUNCTURE: CPT

## 2023-04-01 RX ORDER — SODIUM CHLORIDE 0.9 % (FLUSH) 0.9 %
5-40 SYRINGE (ML) INJECTION EVERY 12 HOURS SCHEDULED
Status: DISCONTINUED | OUTPATIENT
Start: 2023-04-01 | End: 2023-04-04 | Stop reason: HOSPADM

## 2023-04-01 RX ORDER — ONDANSETRON 4 MG/1
4 TABLET, ORALLY DISINTEGRATING ORAL EVERY 8 HOURS PRN
Status: DISCONTINUED | OUTPATIENT
Start: 2023-04-01 | End: 2023-04-04 | Stop reason: HOSPADM

## 2023-04-01 RX ORDER — ACETAMINOPHEN 325 MG/1
650 TABLET ORAL EVERY 6 HOURS PRN
Status: DISCONTINUED | OUTPATIENT
Start: 2023-04-01 | End: 2023-04-04 | Stop reason: HOSPADM

## 2023-04-01 RX ORDER — SODIUM CHLORIDE 0.9 % (FLUSH) 0.9 %
5-40 SYRINGE (ML) INJECTION PRN
Status: DISCONTINUED | OUTPATIENT
Start: 2023-04-01 | End: 2023-04-04 | Stop reason: HOSPADM

## 2023-04-01 RX ORDER — BUDESONIDE AND FORMOTEROL FUMARATE DIHYDRATE 160; 4.5 UG/1; UG/1
2 AEROSOL RESPIRATORY (INHALATION) 2 TIMES DAILY
Status: DISCONTINUED | OUTPATIENT
Start: 2023-04-01 | End: 2023-04-01 | Stop reason: CLARIF

## 2023-04-01 RX ORDER — ALPRAZOLAM 0.25 MG/1
0.25 TABLET ORAL EVERY 12 HOURS PRN
COMMUNITY
Start: 2022-11-17

## 2023-04-01 RX ORDER — CARVEDILOL 25 MG/1
25 TABLET ORAL 2 TIMES DAILY WITH MEALS
Status: DISCONTINUED | OUTPATIENT
Start: 2023-04-01 | End: 2023-04-04 | Stop reason: HOSPADM

## 2023-04-01 RX ORDER — IPRATROPIUM BROMIDE AND ALBUTEROL SULFATE 2.5; .5 MG/3ML; MG/3ML
1 SOLUTION RESPIRATORY (INHALATION) EVERY 4 HOURS PRN
Status: DISCONTINUED | OUTPATIENT
Start: 2023-04-01 | End: 2023-04-04 | Stop reason: HOSPADM

## 2023-04-01 RX ORDER — FLUTICASONE PROPIONATE AND SALMETEROL 250; 50 UG/1; UG/1
1 POWDER RESPIRATORY (INHALATION) EVERY 12 HOURS
COMMUNITY
Start: 2023-03-22

## 2023-04-01 RX ORDER — PANTOPRAZOLE SODIUM 40 MG/1
40 TABLET, DELAYED RELEASE ORAL
Status: DISCONTINUED | OUTPATIENT
Start: 2023-04-01 | End: 2023-04-01

## 2023-04-01 RX ORDER — ONDANSETRON 2 MG/ML
4 INJECTION INTRAMUSCULAR; INTRAVENOUS EVERY 6 HOURS PRN
Status: DISCONTINUED | OUTPATIENT
Start: 2023-04-01 | End: 2023-04-04 | Stop reason: HOSPADM

## 2023-04-01 RX ORDER — FLUTICASONE PROPIONATE 50 MCG
2 SPRAY, SUSPENSION (ML) NASAL 2 TIMES DAILY
Status: DISCONTINUED | OUTPATIENT
Start: 2023-04-01 | End: 2023-04-04 | Stop reason: HOSPADM

## 2023-04-01 RX ORDER — ALPRAZOLAM 0.25 MG/1
0.25 TABLET ORAL EVERY 12 HOURS PRN
Status: DISCONTINUED | OUTPATIENT
Start: 2023-04-01 | End: 2023-04-04 | Stop reason: HOSPADM

## 2023-04-01 RX ORDER — ARFORMOTEROL TARTRATE 15 UG/2ML
15 SOLUTION RESPIRATORY (INHALATION) 2 TIMES DAILY
Status: DISCONTINUED | OUTPATIENT
Start: 2023-04-01 | End: 2023-04-04 | Stop reason: HOSPADM

## 2023-04-01 RX ORDER — LOSARTAN POTASSIUM 25 MG/1
25 TABLET ORAL DAILY
Status: DISCONTINUED | OUTPATIENT
Start: 2023-04-01 | End: 2023-04-04 | Stop reason: HOSPADM

## 2023-04-01 RX ORDER — AMLODIPINE BESYLATE 5 MG/1
5 TABLET ORAL DAILY
Status: DISCONTINUED | OUTPATIENT
Start: 2023-04-01 | End: 2023-04-01

## 2023-04-01 RX ORDER — MONTELUKAST SODIUM 10 MG/1
10 TABLET ORAL NIGHTLY
Status: DISCONTINUED | OUTPATIENT
Start: 2023-04-01 | End: 2023-04-04 | Stop reason: HOSPADM

## 2023-04-01 RX ORDER — BUDESONIDE 0.5 MG/2ML
0.5 INHALANT ORAL 2 TIMES DAILY
Status: DISCONTINUED | OUTPATIENT
Start: 2023-04-01 | End: 2023-04-04 | Stop reason: HOSPADM

## 2023-04-01 RX ORDER — SPIRONOLACTONE 25 MG/1
12.5 TABLET ORAL DAILY
Status: DISCONTINUED | OUTPATIENT
Start: 2023-04-01 | End: 2023-04-04 | Stop reason: HOSPADM

## 2023-04-01 RX ORDER — SODIUM CHLORIDE 9 MG/ML
INJECTION, SOLUTION INTRAVENOUS PRN
Status: DISCONTINUED | OUTPATIENT
Start: 2023-04-01 | End: 2023-04-04 | Stop reason: HOSPADM

## 2023-04-01 RX ORDER — TRAZODONE HYDROCHLORIDE 50 MG/1
25 TABLET ORAL NIGHTLY PRN
Status: DISCONTINUED | OUTPATIENT
Start: 2023-04-01 | End: 2023-04-04 | Stop reason: HOSPADM

## 2023-04-01 RX ADMIN — SODIUM CHLORIDE, PRESERVATIVE FREE 10 ML: 5 INJECTION INTRAVENOUS at 20:56

## 2023-04-01 RX ADMIN — SODIUM CHLORIDE, PRESERVATIVE FREE 10 ML: 5 INJECTION INTRAVENOUS at 09:00

## 2023-04-01 RX ADMIN — BUDESONIDE 500 MCG: 0.5 INHALANT RESPIRATORY (INHALATION) at 07:25

## 2023-04-01 RX ADMIN — BUDESONIDE 500 MCG: 0.5 INHALANT RESPIRATORY (INHALATION) at 20:56

## 2023-04-01 RX ADMIN — ARFORMOTEROL TARTRATE 15 MCG: 15 SOLUTION RESPIRATORY (INHALATION) at 21:39

## 2023-04-01 RX ADMIN — IPRATROPIUM BROMIDE AND ALBUTEROL SULFATE 1 AMPULE: .5; 2.5 SOLUTION RESPIRATORY (INHALATION) at 11:08

## 2023-04-01 RX ADMIN — CARVEDILOL 25 MG: 25 TABLET, FILM COATED ORAL at 17:55

## 2023-04-01 RX ADMIN — ARFORMOTEROL TARTRATE 15 MCG: 15 SOLUTION RESPIRATORY (INHALATION) at 07:25

## 2023-04-01 RX ADMIN — CARVEDILOL 25 MG: 25 TABLET, FILM COATED ORAL at 09:55

## 2023-04-01 RX ADMIN — FLUTICASONE PROPIONATE 2 SPRAY: 50 SPRAY, METERED NASAL at 11:06

## 2023-04-01 RX ADMIN — POTASSIUM BICARBONATE 40 MEQ: 782 TABLET, EFFERVESCENT ORAL at 12:52

## 2023-04-01 RX ADMIN — MONTELUKAST 10 MG: 10 TABLET, FILM COATED ORAL at 20:56

## 2023-04-01 RX ADMIN — PANTOPRAZOLE SODIUM 40 MG: 40 INJECTION, POWDER, FOR SOLUTION INTRAVENOUS at 09:56

## 2023-04-01 RX ADMIN — FLUTICASONE PROPIONATE 2 SPRAY: 50 SPRAY, METERED NASAL at 21:41

## 2023-04-01 NOTE — ACP (ADVANCE CARE PLANNING)
Advance Care Planning     Advance Care Planning Inpatient Note  [facility] Department    Today's Date: 4/1/2023  Unit: [facility]    Received request from . Upon review of chart and communication with care team, patient's decision making abilities are not in question. . Patient was/were present in the room during visit. Goals of ACP Conversation:  Discuss advance care planning documents    Health Care Decision Makers:     No healthcare decision makers have been documented. Click here to complete [address] including selection of the Healthcare Decision Maker Relationship (ie \"Primary\")  Summary:  No Decision Maker named by patient at this time    Advance Care Planning Documents (Patient Wishes):  None     Assessment:  Patient seen in room 2315 as a new admit to the hospital this morning. Patient was found lying in the bed  due to an acute GI  bleed. She has been here many times in the past.  Asked patient about her having an advance directive and she replied that she does not want to have one.     Interventions:  Patient DECLINED ACP conversation    Care Preferences Communicated:   No    Outcomes/Plan:      Electronically signed by Kenyon Sloan., [address] on 4/1/2023 at 11:52 AM

## 2023-04-01 NOTE — CONSENT
Informed Consent for Blood Component Transfusion Note    I have discussed with the patient the rationale for blood component transfusion; its benefits in treating or preventing fatigue, organ damage, or death; and its risk which includes mild transfusion reactions, rare risk of blood borne infection, or more serious but rare reactions. I have discussed the alternatives to transfusion, including the risk and consequences of not receiving transfusion. The patient had an opportunity to ask questions and had agreed to proceed with transfusion of blood components.     Electronically signed by Sharyn Layton DO on 4/1/23 at 8:06 AM EDT

## 2023-04-01 NOTE — H&P
vein is patent with normal hemodynamics. Conclusions: No evidence of deep venous thrombosis in the right lower extremity. No evidence of venous reflux. Assessment/Plan    Principal Problem:    Acute GI bleeding  Active Problems:    Chronic obstructive pulmonary disease (HCC)    NSTEMI (non-ST elevated myocardial infarction) (HCC)    GI bleed    Heart failure with reduced ejection fraction (HCC)    Combined systolic and diastolic congestive heart failure (HCC)    Localized swelling of right lower extremity  Resolved Problems:    * No resolved hospital problems. *       Acute GI bleed -suspected lower. Reported bright red bowel movement per rectum that started on Friday. Patient had another episode while in the ER. She is not on antiplatelet or anticoagulation. Had similar episode in fall 2022. Hemoglobin is currently stable after she received 1 unit of packed red blood cells in the ED. H&H every 8 hours  Protonix twice daily  N.p.o.  GI consult in the morning  Elevated troponin  -likely type II NSTEMI in the setting of acute GI bleed. Patient denies chest pain. Troponin 150 -->138. No acute EKG changes  Right lower extremity swelling -patient reported this has been ongoing since December 2020 after a bout of gout. Ultrasound was completed earlier this month and reported as negative. HFrEF -EF 34% in September 2022. Per cardiology note patient had refused AICD in the past.  She is clinically not in heart failure  Continue home GDMT. Currently holding losartan and spironolactone given her recent diagnosis this can be resumed with clinical stability  Continue other home chronic medications    DVT/GI Prophylaxis: Held given acute GI bleed  Code Status: Full code  Point of contact: Josr Alford [phone]    Discussed with patient and/or family at bedside about hospital admission and my plan care, both understood and agree with my plan care.     [address],   4/1/2023 2:10

## 2023-04-01 NOTE — ED NOTES
TRANSFER - OUT REPORT:    Verbal report given to Elvira Pierre RN on Selena Angles  being transferred to ICU  for routine progression of patient care       Report consisted of patient's Situation, Background, Assessment and   Recommendations(SBAR). Information from the following report(s) Nurse Handoff Report, ED Encounter Summary, ED SBAR, MAR, Recent Results, and Neuro Assessment was reviewed with the receiving nurse. Becker Assessment: Presents to emergency department  because of falls (Syncope, seizure, or loss of consciousness): No, Age > 79: Yes, Altered Mental Status, Intoxication with alcohol or substance confusion (Disorientation, impaired judgment, poor safety awaremess, or inability to follow instructions): No, Impaired Mobility: Ambulates or transfers with assistive devices or assistance; Unable to ambulate or transer.: No  Lines:   Peripheral IV 03/31/23 Right Forearm (Active)        Opportunity for questions and clarification was provided.       Patient transported with:  Monitor and [address], RN  03/31/23 2000

## 2023-04-02 ENCOUNTER — APPOINTMENT (OUTPATIENT)
Facility: HOSPITAL | Age: 83
DRG: 378 | End: 2023-04-02
Attending: INTERNAL MEDICINE
Payer: MEDICARE

## 2023-04-02 ENCOUNTER — ANESTHESIA EVENT (OUTPATIENT)
Facility: HOSPITAL | Age: 83
DRG: 378 | End: 2023-04-02
Payer: MEDICARE

## 2023-04-02 LAB
ANION GAP SERPL CALC-SCNC: 4 MMOL/L (ref 3–18)
BUN SERPL-MCNC: 7 MG/DL (ref 7–18)
BUN/CREAT SERPL: 8 (ref 12–20)
CALCIUM SERPL-MCNC: 8.4 MG/DL (ref 8.5–10.1)
CHLORIDE SERPL-SCNC: 112 MMOL/L (ref 100–111)
CO2 SERPL-SCNC: 24 MMOL/L (ref 21–32)
CREAT SERPL-MCNC: 0.86 MG/DL (ref 0.6–1.3)
ECHO AO ROOT DIAM: 2.7 CM
ECHO AO ROOT INDEX: 1.64 CM/M2
ECHO AV AREA PEAK VELOCITY: 1.6 CM2
ECHO AV AREA/BSA PEAK VELOCITY: 1 CM2/M2
ECHO AV PEAK GRADIENT: 9 MMHG
ECHO AV PEAK VELOCITY: 1.5 M/S
ECHO AV VELOCITY RATIO: 0.47
ECHO BSA: 1.68 M2
ECHO EST RA PRESSURE: 3 MMHG
ECHO LA AREA 2C: 27.7 CM2
ECHO LA AREA 4C: 23.6 CM2
ECHO LA VOL 2C: 112 ML (ref 22–52)
ECHO LA VOL 4C: 83 ML (ref 22–52)
ECHO LA VOLUME AREA LENGTH: 108 ML
ECHO LA VOLUME INDEX A2C: 68 ML/M2 (ref 16–34)
ECHO LA VOLUME INDEX A4C: 50 ML/M2 (ref 16–34)
ECHO LA VOLUME INDEX AREA LENGTH: 65 ML/M2 (ref 16–34)
ECHO LV E' LATERAL VELOCITY: 10 CM/S
ECHO LV E' SEPTAL VELOCITY: 7 CM/S
ECHO LV FRACTIONAL SHORTENING: 15 % (ref 28–44)
ECHO LV INTERNAL DIMENSION DIASTOLE INDEX: 3.21 CM/M2
ECHO LV INTERNAL DIMENSION DIASTOLIC: 5.3 CM (ref 3.9–5.3)
ECHO LV INTERNAL DIMENSION SYSTOLIC INDEX: 2.73 CM/M2
ECHO LV INTERNAL DIMENSION SYSTOLIC: 4.5 CM
ECHO LV IVSD: 1.3 CM (ref 0.6–0.9)
ECHO LV MASS 2D: 318.9 G (ref 67–162)
ECHO LV MASS INDEX 2D: 193.3 G/M2 (ref 43–95)
ECHO LV POSTERIOR WALL DIASTOLIC: 1.5 CM (ref 0.6–0.9)
ECHO LV RELATIVE WALL THICKNESS RATIO: 0.57
ECHO LVOT AREA: 3.5 CM2
ECHO LVOT DIAM: 2.1 CM
ECHO LVOT MEAN GRADIENT: 1 MMHG
ECHO LVOT PEAK GRADIENT: 2 MMHG
ECHO LVOT PEAK VELOCITY: 0.7 M/S
ECHO LVOT STROKE VOLUME INDEX: 29.2 ML/M2
ECHO LVOT SV: 48.1 ML
ECHO LVOT VTI: 13.9 CM
ECHO MV A VELOCITY: 0.9 M/S
ECHO MV E DECELERATION TIME (DT): 228 MS
ECHO MV E VELOCITY: 0.71 M/S
ECHO MV E/A RATIO: 0.79
ECHO MV E/E' LATERAL: 7.1
ECHO MV E/E' RATIO (AVERAGED): 8.62
ECHO MV E/E' SEPTAL: 10.14
ECHO PV MAX VELOCITY: 0.9 M/S
ECHO PV PEAK GRADIENT: 3 MMHG
ECHO RIGHT VENTRICULAR SYSTOLIC PRESSURE (RVSP): 25 MMHG
ECHO RV FREE WALL PEAK S': 13 CM/S
ECHO RV LONGITUDINAL DIMENSION: 4.2 CM
ECHO RV TAPSE: 2.2 CM (ref 1.7–?)
ECHO TV REGURGITANT MAX VELOCITY: 2.33 M/S
ECHO TV REGURGITANT PEAK GRADIENT: 22 MMHG
EKG ATRIAL RATE: 73 BPM
EKG DIAGNOSIS: NORMAL
EKG P AXIS: 60 DEGREES
EKG P-R INTERVAL: 206 MS
EKG Q-T INTERVAL: 428 MS
EKG QRS DURATION: 82 MS
EKG QTC CALCULATION (BAZETT): 471 MS
EKG R AXIS: 18 DEGREES
EKG T AXIS: 225 DEGREES
EKG VENTRICULAR RATE: 73 BPM
GLUCOSE SERPL-MCNC: 83 MG/DL (ref 74–99)
HCT VFR BLD AUTO: 23.3 % (ref 35–45)
HCT VFR BLD AUTO: 23.3 % (ref 35–45)
HCT VFR BLD AUTO: 25.4 % (ref 35–45)
HCT VFR BLD AUTO: 25.6 % (ref 35–45)
HGB BLD-MCNC: 7.1 G/DL (ref 12–16)
HGB BLD-MCNC: 7.2 G/DL (ref 12–16)
HGB BLD-MCNC: 7.8 G/DL (ref 12–16)
HGB BLD-MCNC: 8 G/DL (ref 12–16)
HISTORY CHECK: NORMAL
POTASSIUM SERPL-SCNC: 3.7 MMOL/L (ref 3.5–5.5)
SODIUM SERPL-SCNC: 140 MMOL/L (ref 136–145)

## 2023-04-02 PROCEDURE — 93010 ELECTROCARDIOGRAM REPORT: CPT | Performed by: INTERNAL MEDICINE

## 2023-04-02 PROCEDURE — 94761 N-INVAS EAR/PLS OXIMETRY MLT: CPT

## 2023-04-02 PROCEDURE — 2140000001 HC CVICU INTERMEDIATE R&B

## 2023-04-02 PROCEDURE — 6370000000 HC RX 637 (ALT 250 FOR IP): Performed by: STUDENT IN AN ORGANIZED HEALTH CARE EDUCATION/TRAINING PROGRAM

## 2023-04-02 PROCEDURE — 93308 TTE F-UP OR LMTD: CPT

## 2023-04-02 PROCEDURE — 6370000000 HC RX 637 (ALT 250 FOR IP): Performed by: INTERNAL MEDICINE

## 2023-04-02 PROCEDURE — 80048 BASIC METABOLIC PNL TOTAL CA: CPT

## 2023-04-02 PROCEDURE — 99232 SBSQ HOSP IP/OBS MODERATE 35: CPT | Performed by: INTERNAL MEDICINE

## 2023-04-02 PROCEDURE — 2580000003 HC RX 258: Performed by: STUDENT IN AN ORGANIZED HEALTH CARE EDUCATION/TRAINING PROGRAM

## 2023-04-02 PROCEDURE — 96376 TX/PRO/DX INJ SAME DRUG ADON: CPT

## 2023-04-02 PROCEDURE — 85014 HEMATOCRIT: CPT

## 2023-04-02 PROCEDURE — C9113 INJ PANTOPRAZOLE SODIUM, VIA: HCPCS | Performed by: STUDENT IN AN ORGANIZED HEALTH CARE EDUCATION/TRAINING PROGRAM

## 2023-04-02 PROCEDURE — 86923 COMPATIBILITY TEST ELECTRIC: CPT

## 2023-04-02 PROCEDURE — 93321 DOPPLER ECHO F-UP/LMTD STD: CPT | Performed by: INTERNAL MEDICINE

## 2023-04-02 PROCEDURE — 6360000002 HC RX W HCPCS: Performed by: STUDENT IN AN ORGANIZED HEALTH CARE EDUCATION/TRAINING PROGRAM

## 2023-04-02 PROCEDURE — A4216 STERILE WATER/SALINE, 10 ML: HCPCS | Performed by: STUDENT IN AN ORGANIZED HEALTH CARE EDUCATION/TRAINING PROGRAM

## 2023-04-02 PROCEDURE — 99222 1ST HOSP IP/OBS MODERATE 55: CPT | Performed by: INTERNAL MEDICINE

## 2023-04-02 PROCEDURE — G0378 HOSPITAL OBSERVATION PER HR: HCPCS

## 2023-04-02 PROCEDURE — 94640 AIRWAY INHALATION TREATMENT: CPT

## 2023-04-02 PROCEDURE — 93005 ELECTROCARDIOGRAM TRACING: CPT | Performed by: PHYSICIAN ASSISTANT

## 2023-04-02 PROCEDURE — 6370000000 HC RX 637 (ALT 250 FOR IP): Performed by: PHYSICIAN ASSISTANT

## 2023-04-02 PROCEDURE — 36415 COLL VENOUS BLD VENIPUNCTURE: CPT

## 2023-04-02 PROCEDURE — 86900 BLOOD TYPING SEROLOGIC ABO: CPT

## 2023-04-02 PROCEDURE — 93325 DOPPLER ECHO COLOR FLOW MAPG: CPT | Performed by: INTERNAL MEDICINE

## 2023-04-02 PROCEDURE — 93308 TTE F-UP OR LMTD: CPT | Performed by: INTERNAL MEDICINE

## 2023-04-02 RX ORDER — SODIUM CHLORIDE 9 MG/ML
INJECTION, SOLUTION INTRAVENOUS PRN
Status: DISCONTINUED | OUTPATIENT
Start: 2023-04-02 | End: 2023-04-04 | Stop reason: HOSPADM

## 2023-04-02 RX ORDER — FUROSEMIDE 20 MG/1
20 TABLET ORAL DAILY
Status: DISCONTINUED | OUTPATIENT
Start: 2023-04-02 | End: 2023-04-04 | Stop reason: HOSPADM

## 2023-04-02 RX ADMIN — PANTOPRAZOLE SODIUM 40 MG: 40 INJECTION, POWDER, FOR SOLUTION INTRAVENOUS at 08:57

## 2023-04-02 RX ADMIN — BUDESONIDE 500 MCG: 0.5 INHALANT RESPIRATORY (INHALATION) at 07:19

## 2023-04-02 RX ADMIN — POLYETHYLENE GLYCOL-3350 AND ELECTROLYTES 2000 ML: 236; 6.74; 5.86; 2.97; 22.74 POWDER, FOR SOLUTION ORAL at 17:20

## 2023-04-02 RX ADMIN — SODIUM CHLORIDE, PRESERVATIVE FREE 10 ML: 5 INJECTION INTRAVENOUS at 08:58

## 2023-04-02 RX ADMIN — CARVEDILOL 25 MG: 25 TABLET, FILM COATED ORAL at 17:19

## 2023-04-02 RX ADMIN — BUDESONIDE 500 MCG: 0.5 INHALANT RESPIRATORY (INHALATION) at 20:20

## 2023-04-02 RX ADMIN — ARFORMOTEROL TARTRATE 15 MCG: 15 SOLUTION RESPIRATORY (INHALATION) at 07:19

## 2023-04-02 RX ADMIN — FLUTICASONE PROPIONATE 2 SPRAY: 50 SPRAY, METERED NASAL at 09:03

## 2023-04-02 RX ADMIN — CARVEDILOL 25 MG: 25 TABLET, FILM COATED ORAL at 08:56

## 2023-04-02 RX ADMIN — POTASSIUM BICARBONATE 40 MEQ: 782 TABLET, EFFERVESCENT ORAL at 08:57

## 2023-04-02 RX ADMIN — FLUTICASONE PROPIONATE 2 SPRAY: 50 SPRAY, METERED NASAL at 21:00

## 2023-04-02 RX ADMIN — FUROSEMIDE 20 MG: 20 TABLET ORAL at 12:06

## 2023-04-02 RX ADMIN — MONTELUKAST 10 MG: 10 TABLET, FILM COATED ORAL at 21:00

## 2023-04-02 RX ADMIN — ARFORMOTEROL TARTRATE 15 MCG: 15 SOLUTION RESPIRATORY (INHALATION) at 20:20

## 2023-04-02 RX ADMIN — SODIUM CHLORIDE, PRESERVATIVE FREE 10 ML: 5 INJECTION INTRAVENOUS at 21:00

## 2023-04-02 NOTE — CONSULTS
[facility] - Consult Note    Cardiology consultation request from Dr. Neena Patel for evaluation and management/treatment of colonoscopy clearance, elevated trop     Date of  Admission: 4/1/2023 12:47 AM   Primary Care Physician:  Julius Powell MD    Attending Cardiologist: Dr. Teja Logan:     -Anemia in setting of GIB. S/p transfusion. H/o of GIB. Colonoscopy 9/16/17 -- diverticulosis in sigmoid colon and descending colon, actively bleeding diverticulum s/p epinephrine injection, 4mm polyp in sigmoid polyp removed, non bleeding internal hemorrhoids  Endoscopy 11/13/2022: Hiatal hernia noted, superficial erosion with healing ulcers in stomach  -Indeterminate troponin, suspect type 2 demand in setting of anemia/GIB. Currently without any symptoms to suggest unstable angina. ECG this admission pending.   -Chronic HFrEF. Euvolemic at this time.   -Non-Ischemic Cardiomyopathy, previously declined ICD in the past. Initially dx in 2017, EF 33% at the time. Normal cors per cath 5/23/17    Echo 5/8/17 -- EF 20%, stage III diastolic dysfunction, PAP 44mmHg  Echo 9/2/2022: EF 34%, moderate cLVH, grade 2 DD, left atrium severely dilated  -NST on 3/4/2020 was high risk with high-grade occlusion to proximal LAD. She did not undergo cardiac cath at that time. -ASD, declined repair per primary Cardiologist.   -HLD, on statin as outpatient.   -Gout   -Ambulates with walker/cane. Primary cardiologist is Dr. Maria Antonia Moran:     -ECHO pending. ECG for this admission ordered and pending.   -Previous EGD in 11/2022, patient tolerated procedure well.   -Currently does not appear to be in decompensated heart failure, no symptoms concerning for unstable angina. If no significant changes seen on ECHO/ECG, she may proceed with her procedures as indicated. -judicious use of IVF given underlying CMY. Continue to monitor for s/s volume overload. Continue maintenance lasix.    -Anemia mgmt per primary team,
Physical Activity: Not on file   Stress: Not on file   Social Connections: Not on file   Intimate Partner Violence: Not on file   Housing Stability: Not on file       FHX:   History reviewed. No pertinent family history. Allergy:   Allergies   Allergen Reactions    Ace Inhibitors Other (See Comments)     Not sure, was a long time ago    Cefaclor Hives and Itching    Telithromycin Hives and Itching    Cephalosporins Itching and Rash       Patient Active Problem List   Diagnosis    Asthma    Chronic obstructive pulmonary disease (HCC)    GERD (gastroesophageal reflux disease)    Thyroid disease    Arthritis    Hypertension    Allergic rhinitis    Sciatica    Diverticulitis    NSTEMI (non-ST elevated myocardial infarction) ([facility])    GI bleed    Acute GI bleeding    Heart failure with reduced ejection fraction (HCC)    Acute lower GI bleeding    Combined systolic and diastolic congestive heart failure ([facility])    Localized swelling of right lower extremity       Home Medications:     @[facility]@    Review of Systems:     Constitutional: No fevers, chills, weight loss, fatigue. Skin: No rashes, pruritis, jaundice, ulcerations, erythema. HENT: No headaches, nosebleeds, sinus pressure, rhinorrhea, sore throat. Eyes: No visual changes, blurred vision, eye pain, photophobia, jaundice. Cardiovascular: No chest pain, heart palpitations. Respiratory: No cough, SOB, wheezing, chest discomfort, orthopnea. Gastrointestinal: Neg unless noted otherwise in H&P   Genitourinary: No dysuria, bleeding, discharge, pyuria. Musculoskeletal: No weakness, arthralgias, wasting. Endo: No sweats. Heme: No bruising, easy bleeding. Allergies: As noted. Neurological: Cranial nerves intact. Alert and oriented. Gait not assessed. Psychiatric:  No anxiety, depression, hallucinations.           /72   Pulse 92   Temp 98.5 °F (36.9 °C) (Oral)   Resp 17   Ht 5' 2\" (1.575 m)   Wt 142 lb 13.6 oz (64.8 kg)   SpO2 98%

## 2023-04-03 ENCOUNTER — ANESTHESIA (OUTPATIENT)
Facility: HOSPITAL | Age: 83
DRG: 378 | End: 2023-04-03
Payer: MEDICARE

## 2023-04-03 LAB
ABO + RH BLD: NORMAL
BLD PROD TYP BPU: NORMAL
BLOOD BANK DISPENSE STATUS: NORMAL
BLOOD GROUP ANTIBODIES SERPL: NEGATIVE
BPU ID: NORMAL
CROSSMATCH RESULT: NORMAL
HCT VFR BLD AUTO: 21.6 % (ref 35–45)
HCT VFR BLD AUTO: 21.6 % (ref 35–45)
HCT VFR BLD AUTO: 23.4 % (ref 35–45)
HGB BLD-MCNC: 6.7 G/DL (ref 12–16)
HGB BLD-MCNC: 6.8 G/DL (ref 12–16)
HGB BLD-MCNC: 7.2 G/DL (ref 12–16)
HISTORY CHECK: NORMAL
SPECIMEN EXP DATE BLD: NORMAL
TRANSFUSION STATUS PATIENT QL: NORMAL
UNIT DIVISION: 0

## 2023-04-03 PROCEDURE — 6360000002 HC RX W HCPCS: Performed by: STUDENT IN AN ORGANIZED HEALTH CARE EDUCATION/TRAINING PROGRAM

## 2023-04-03 PROCEDURE — 7100000010 HC PHASE II RECOVERY - FIRST 15 MIN: Performed by: STUDENT IN AN ORGANIZED HEALTH CARE EDUCATION/TRAINING PROGRAM

## 2023-04-03 PROCEDURE — 6370000000 HC RX 637 (ALT 250 FOR IP): Performed by: INTERNAL MEDICINE

## 2023-04-03 PROCEDURE — C9113 INJ PANTOPRAZOLE SODIUM, VIA: HCPCS | Performed by: STUDENT IN AN ORGANIZED HEALTH CARE EDUCATION/TRAINING PROGRAM

## 2023-04-03 PROCEDURE — 2140000001 HC CVICU INTERMEDIATE R&B

## 2023-04-03 PROCEDURE — 7100000011 HC PHASE II RECOVERY - ADDTL 15 MIN: Performed by: STUDENT IN AN ORGANIZED HEALTH CARE EDUCATION/TRAINING PROGRAM

## 2023-04-03 PROCEDURE — G0378 HOSPITAL OBSERVATION PER HR: HCPCS

## 2023-04-03 PROCEDURE — 36415 COLL VENOUS BLD VENIPUNCTURE: CPT

## 2023-04-03 PROCEDURE — 99232 SBSQ HOSP IP/OBS MODERATE 35: CPT | Performed by: INTERNAL MEDICINE

## 2023-04-03 PROCEDURE — 94640 AIRWAY INHALATION TREATMENT: CPT

## 2023-04-03 PROCEDURE — 2500000003 HC RX 250 WO HCPCS: Performed by: ANESTHESIOLOGY

## 2023-04-03 PROCEDURE — 3600007512: Performed by: STUDENT IN AN ORGANIZED HEALTH CARE EDUCATION/TRAINING PROGRAM

## 2023-04-03 PROCEDURE — 3700000001 HC ADD 15 MINUTES (ANESTHESIA): Performed by: STUDENT IN AN ORGANIZED HEALTH CARE EDUCATION/TRAINING PROGRAM

## 2023-04-03 PROCEDURE — 2580000003 HC RX 258: Performed by: STUDENT IN AN ORGANIZED HEALTH CARE EDUCATION/TRAINING PROGRAM

## 2023-04-03 PROCEDURE — A4216 STERILE WATER/SALINE, 10 ML: HCPCS | Performed by: STUDENT IN AN ORGANIZED HEALTH CARE EDUCATION/TRAINING PROGRAM

## 2023-04-03 PROCEDURE — 6370000000 HC RX 637 (ALT 250 FOR IP): Performed by: PHYSICIAN ASSISTANT

## 2023-04-03 PROCEDURE — 96376 TX/PRO/DX INJ SAME DRUG ADON: CPT

## 2023-04-03 PROCEDURE — 6360000002 HC RX W HCPCS: Performed by: ANESTHESIOLOGY

## 2023-04-03 PROCEDURE — 36430 TRANSFUSION BLD/BLD COMPNT: CPT

## 2023-04-03 PROCEDURE — 0DJD8ZZ INSPECTION OF LOWER INTESTINAL TRACT, VIA NATURAL OR ARTIFICIAL OPENING ENDOSCOPIC: ICD-10-PCS | Performed by: STUDENT IN AN ORGANIZED HEALTH CARE EDUCATION/TRAINING PROGRAM

## 2023-04-03 PROCEDURE — 2709999900 HC NON-CHARGEABLE SUPPLY: Performed by: STUDENT IN AN ORGANIZED HEALTH CARE EDUCATION/TRAINING PROGRAM

## 2023-04-03 PROCEDURE — 85014 HEMATOCRIT: CPT

## 2023-04-03 PROCEDURE — 3600007502: Performed by: STUDENT IN AN ORGANIZED HEALTH CARE EDUCATION/TRAINING PROGRAM

## 2023-04-03 PROCEDURE — 6370000000 HC RX 637 (ALT 250 FOR IP): Performed by: STUDENT IN AN ORGANIZED HEALTH CARE EDUCATION/TRAINING PROGRAM

## 2023-04-03 PROCEDURE — 3700000000 HC ANESTHESIA ATTENDED CARE: Performed by: STUDENT IN AN ORGANIZED HEALTH CARE EDUCATION/TRAINING PROGRAM

## 2023-04-03 PROCEDURE — P9016 RBC LEUKOCYTES REDUCED: HCPCS

## 2023-04-03 RX ORDER — PROPOFOL 10 MG/ML
INJECTION, EMULSION INTRAVENOUS PRN
Status: DISCONTINUED | OUTPATIENT
Start: 2023-04-03 | End: 2023-04-03 | Stop reason: SDUPTHER

## 2023-04-03 RX ORDER — SODIUM CHLORIDE 9 MG/ML
INJECTION, SOLUTION INTRAVENOUS PRN
Status: DISCONTINUED | OUTPATIENT
Start: 2023-04-03 | End: 2023-04-03 | Stop reason: HOSPADM

## 2023-04-03 RX ORDER — SACCHAROMYCES BOULARDII 250 MG
250 CAPSULE ORAL 2 TIMES DAILY
Status: DISCONTINUED | OUTPATIENT
Start: 2023-04-04 | End: 2023-04-03 | Stop reason: RX

## 2023-04-03 RX ORDER — SODIUM CHLORIDE 0.9 % (FLUSH) 0.9 %
5-40 SYRINGE (ML) INJECTION PRN
Status: DISCONTINUED | OUTPATIENT
Start: 2023-04-03 | End: 2023-04-03 | Stop reason: HOSPADM

## 2023-04-03 RX ORDER — LACTOBACILLUS ACIDOPHILUS / LACTOBACILLUS BULGARICUS 100 MILLION CFU STRENGTH
1 GRANULES ORAL 2 TIMES DAILY
Status: DISCONTINUED | OUTPATIENT
Start: 2023-04-03 | End: 2023-04-04 | Stop reason: HOSPADM

## 2023-04-03 RX ORDER — CIPROFLOXACIN 250 MG/1
500 TABLET, FILM COATED ORAL EVERY 12 HOURS SCHEDULED
Status: DISCONTINUED | OUTPATIENT
Start: 2023-04-03 | End: 2023-04-04 | Stop reason: HOSPADM

## 2023-04-03 RX ORDER — METRONIDAZOLE 500 MG/1
500 TABLET ORAL EVERY 12 HOURS SCHEDULED
Status: DISCONTINUED | OUTPATIENT
Start: 2023-04-03 | End: 2023-04-04 | Stop reason: HOSPADM

## 2023-04-03 RX ORDER — LIDOCAINE HYDROCHLORIDE 20 MG/ML
INJECTION, SOLUTION EPIDURAL; INFILTRATION; INTRACAUDAL; PERINEURAL PRN
Status: DISCONTINUED | OUTPATIENT
Start: 2023-04-03 | End: 2023-04-03 | Stop reason: SDUPTHER

## 2023-04-03 RX ORDER — SODIUM CHLORIDE 9 MG/ML
INJECTION, SOLUTION INTRAVENOUS PRN
Status: DISCONTINUED | OUTPATIENT
Start: 2023-04-03 | End: 2023-04-04 | Stop reason: HOSPADM

## 2023-04-03 RX ORDER — SODIUM CHLORIDE 0.9 % (FLUSH) 0.9 %
5-40 SYRINGE (ML) INJECTION EVERY 12 HOURS SCHEDULED
Status: DISCONTINUED | OUTPATIENT
Start: 2023-04-03 | End: 2023-04-03 | Stop reason: HOSPADM

## 2023-04-03 RX ORDER — SODIUM CHLORIDE, SODIUM LACTATE, POTASSIUM CHLORIDE, CALCIUM CHLORIDE 600; 310; 30; 20 MG/100ML; MG/100ML; MG/100ML; MG/100ML
INJECTION, SOLUTION INTRAVENOUS CONTINUOUS
Status: DISCONTINUED | OUTPATIENT
Start: 2023-04-03 | End: 2023-04-03

## 2023-04-03 RX ADMIN — IPRATROPIUM BROMIDE AND ALBUTEROL SULFATE 1 AMPULE: .5; 2.5 SOLUTION RESPIRATORY (INHALATION) at 17:51

## 2023-04-03 RX ADMIN — CARVEDILOL 25 MG: 25 TABLET, FILM COATED ORAL at 17:41

## 2023-04-03 RX ADMIN — LIDOCAINE HYDROCHLORIDE 80 MG: 20 INJECTION, SOLUTION EPIDURAL; INFILTRATION; INTRACAUDAL; PERINEURAL at 14:36

## 2023-04-03 RX ADMIN — MONTELUKAST 10 MG: 10 TABLET, FILM COATED ORAL at 21:31

## 2023-04-03 RX ADMIN — POTASSIUM BICARBONATE 40 MEQ: 782 TABLET, EFFERVESCENT ORAL at 09:03

## 2023-04-03 RX ADMIN — METRONIDAZOLE 500 MG: 500 TABLET ORAL at 21:31

## 2023-04-03 RX ADMIN — POLYETHYLENE GLYCOL-3350 AND ELECTROLYTES 2000 ML: 236; 6.74; 5.86; 2.97; 22.74 POWDER, FOR SOLUTION ORAL at 02:54

## 2023-04-03 RX ADMIN — FLUTICASONE PROPIONATE 2 SPRAY: 50 SPRAY, METERED NASAL at 09:20

## 2023-04-03 RX ADMIN — ARFORMOTEROL TARTRATE 15 MCG: 15 SOLUTION RESPIRATORY (INHALATION) at 20:51

## 2023-04-03 RX ADMIN — CARVEDILOL 25 MG: 25 TABLET, FILM COATED ORAL at 09:04

## 2023-04-03 RX ADMIN — ARFORMOTEROL TARTRATE 15 MCG: 15 SOLUTION RESPIRATORY (INHALATION) at 09:05

## 2023-04-03 RX ADMIN — SODIUM CHLORIDE, PRESERVATIVE FREE 10 ML: 5 INJECTION INTRAVENOUS at 21:42

## 2023-04-03 RX ADMIN — SODIUM CHLORIDE, PRESERVATIVE FREE 10 ML: 5 INJECTION INTRAVENOUS at 09:21

## 2023-04-03 RX ADMIN — FUROSEMIDE 20 MG: 20 TABLET ORAL at 09:04

## 2023-04-03 RX ADMIN — FLUTICASONE PROPIONATE 2 SPRAY: 50 SPRAY, METERED NASAL at 21:38

## 2023-04-03 RX ADMIN — CIPROFLOXACIN 500 MG: 250 TABLET, FILM COATED ORAL at 22:44

## 2023-04-03 RX ADMIN — PROPOFOL 100 MG: 10 INJECTION, EMULSION INTRAVENOUS at 14:47

## 2023-04-03 RX ADMIN — PANTOPRAZOLE SODIUM 40 MG: 40 INJECTION, POWDER, FOR SOLUTION INTRAVENOUS at 09:05

## 2023-04-03 RX ADMIN — BUDESONIDE 500 MCG: 0.5 INHALANT RESPIRATORY (INHALATION) at 20:51

## 2023-04-03 RX ADMIN — BUDESONIDE 500 MCG: 0.5 INHALANT RESPIRATORY (INHALATION) at 09:05

## 2023-04-03 ASSESSMENT — COPD QUESTIONNAIRES: CAT_SEVERITY: MODERATE

## 2023-04-03 ASSESSMENT — PAIN SCALES - GENERAL
PAINLEVEL_OUTOF10: 0

## 2023-04-03 NOTE — ANESTHESIA PRE PROCEDURE
Current medications:    Current Facility-Administered Medications   Medication Dose Route Frequency Provider Last Rate Last Admin    sodium chloride flush 0.9 % injection 5-40 mL  5-40 mL IntraVENous 2 times per day SARAH Archer - CRNA        sodium chloride flush 0.9 % injection 5-40 mL  5-40 mL IntraVENous PRN Efe Amaya, SARAH - CRNA        0.9 % sodium chloride infusion   IntraVENous PRN Efe Amaya APRN - CRNA        lactated ringers IV soln infusion   IntraVENous Continuous Efe Amaya, APRN - CRNA        furosemide (LASIX) tablet 20 mg  20 mg Oral Daily Cheri Multani PA-C   20 mg at 04/03/23 0904    0.9 % sodium chloride infusion   IntraVENous PRN Modesta Suarez MD        ALPRAZolam Yg [place]) tablet 0.25 mg  0.25 mg Oral Q12H PRN Juliana A Babawale, DO        carvedilol (COREG) tablet 25 mg  25 mg Oral BID WC Juliana A Babawale, DO   25 mg at 04/03/23 0904    fluticasone (FLONASE) 50 MCG/ACT nasal spray 2 spray  2 spray Nasal BID Juliana A Babawale, DO   2 spray at 04/03/23 0920    montelukast (SINGULAIR) tablet 10 mg  10 mg Oral Nightly Juliana A Babawale, DO   10 mg at 04/02/23 2100    [Held by provider] spironolactone (ALDACTONE) tablet 12.5 mg  12.5 mg Oral Daily Juliana A Babawale, DO        sodium chloride flush 0.9 % injection 5-40 mL  5-40 mL IntraVENous 2 times per day Juliana A Babawale, DO   10 mL at 04/03/23 0921    sodium chloride flush 0.9 % injection 5-40 mL  5-40 mL IntraVENous PRN Juliana A Babawale, DO        0.9 % sodium chloride infusion   IntraVENous PRN Juliana A Babawale, DO        ondansetron (ZOFRAN-ODT) disintegrating tablet 4 mg  4 mg Oral Q8H PRN Juliana A Babawale, DO        Or    ondansetron (ZOFRAN) injection 4 mg  4 mg IntraVENous Q6H PRN Juliana A Babawale, DO        acetaminophen (TYLENOL) tablet 650 mg  650 mg Oral Q6H PRN Juliana A Babawale, DO        Or    acetaminophen (TYLENOL) suppository 650 mg  650 mg Rectal Q6H PRN Juliana A

## 2023-04-03 NOTE — PERIOP NOTE
TRANSFER - OUT REPORT:    Verbal report given to Moody Infante RN on Catron  being transferred to [facility] for routine post-op       Report consisted of patient's Situation, Background, Assessment and   Recommendations(SBAR). Information from the following report(s) Nurse Handoff Report was reviewed with the receiving nurse. [place] Assessment: No data recorded  Lines:   Peripheral IV 03/31/23 Right Forearm (Active)   Site Assessment Clean, dry & intact 04/03/23 0400   Line Status Normal saline locked; Capped;Flushed 04/03/23 0400   Line Care Connections checked and tightened;Ports disinfected 04/03/23 0400   Phlebitis Assessment No symptoms 04/03/23 0400   Infiltration Assessment 0 04/03/23 0400   Alcohol Cap Used Yes 04/03/23 0400   Dressing Status Clean, dry & intact 04/03/23 0400   Dressing Type Transparent 04/03/23 0400        Opportunity for questions and clarification was provided.       Patient transported with:  [facility] transport

## 2023-04-03 NOTE — PERIOP NOTE
TRANSFER - IN REPORT:    Verbal report received from [facility] on [place]  being received from 2315 for ordered procedure      Report consisted of patient's Situation, Background, Assessment and   Recommendations(SBAR). Information from the following report(s) Nurse Handoff Report, MAR, Recent Results, and Pre Procedure Checklist was reviewed with the receiving nurse. Opportunity for questions and clarification was provided. Assessment completed upon patient's arrival to unit and care assumed.

## 2023-04-03 NOTE — PROCEDURES
[email or web address]  [phone]    Colonoscopy Procedure Note                 Indications:  Hematochezia    :  Syble Dance, MD    Surgical assistants: none    Referring Provider: Cristobal Rose MD    Sedation:  MAC anesthesia Propofol    Procedure Details:  After informed consent was obtained with all risks and benefits of procedure explained and preoperative exam completed, the patient was taken to the endoscopy suite and placed in the left lateral decubitus position. Upon sequential sedation as per above, a digital rectal exam was performed and was normal.  The Olympus videocolonoscope was inserted in the rectum and carefully advanced to the cecum, which was identified by the ileocecal valve and appendiceal orifice. The quality of preparation was good. The colonoscope was slowly withdrawn with careful evaluation between folds. Retroflexion in the rectum was performed and was normal.    Findings: Moderate pan-diverticular disease, non-lumen distorting. There was one diverticulum in ascending colon with a blood clot and purulent discharge. One flat 4mm polyp near ICV with overlying mucus cap. Left undisturbed as this was a bleeding case. Small internal hemorrhoids seen    Interventions:  none    Specimen Removed:  * No specimens *    Complications: None. EBL:  None. Tissue Implant Device: None    Recommendations:   -Would cover for Gram Neg/Anaerobes (I.e. Cipro+Flagyl)  -Ok for clears. Advance diet if no further bleeding episodes. -If patient has worsening hematochezia would consider tagged RBC scan vs CT angio for IR intervention. The assessment of unprepped colon with diverticular bleed is very difficult and often impossible to find culprit diverticulum.  -Could consider repeat colonoscopy as outpatient for polyp removal in near future if patient is healthy and considered that benefits>risks.     Syble Dance, MD  4/3/2023  2:52 PM

## 2023-04-03 NOTE — ANESTHESIA POSTPROCEDURE EVALUATION
Department of Anesthesiology  Postprocedure Note    Patient: Manjit Meza  MRN: [id number]  YOB: 1940  Date of evaluation: 4/3/2023      Procedure Summary     Date: 04/03/23 Room / Location: [facility] ENDO 02 / [facility] ENDOSCOPY    Anesthesia Start: 1430 Anesthesia Stop: 1457    Procedure: COLONOSCOPY (Abdomen) Diagnosis:       Gastrointestinal hemorrhage, unspecified gastrointestinal hemorrhage type      (Gastrointestinal hemorrhage, unspecified gastrointestinal hemorrhage type [K92.2])    Surgeons: Azar Fernando MD Responsible Provider: Elen Schulz MD    Anesthesia Type: MAC ASA Status: 3          Anesthesia Type: MAC    Juana Phase I: Juana Score: 10    Juana Phase II: Juana Score: 10      Anesthesia Post Evaluation    Patient location during evaluation: bedside  Patient participation: complete - patient participated  Level of consciousness: responsive to verbal stimuli  Airway patency: patent  Nausea & Vomiting: no nausea  Complications: no  Respiratory status: acceptable  Hydration status: euvolemic

## 2023-04-04 VITALS
OXYGEN SATURATION: 100 % | TEMPERATURE: 97.8 F | HEIGHT: 62 IN | BODY MASS INDEX: 26.13 KG/M2 | SYSTOLIC BLOOD PRESSURE: 110 MMHG | DIASTOLIC BLOOD PRESSURE: 63 MMHG | WEIGHT: 142 LBS | RESPIRATION RATE: 16 BRPM | HEART RATE: 85 BPM

## 2023-04-04 LAB
ANION GAP SERPL CALC-SCNC: 6 MMOL/L (ref 3–18)
BASOPHILS # BLD: 0 K/UL (ref 0–0.1)
BASOPHILS NFR BLD: 1 % (ref 0–2)
BUN SERPL-MCNC: 6 MG/DL (ref 7–18)
BUN/CREAT SERPL: 7 (ref 12–20)
CALCIUM SERPL-MCNC: 8.5 MG/DL (ref 8.5–10.1)
CHLORIDE SERPL-SCNC: 108 MMOL/L (ref 100–111)
CO2 SERPL-SCNC: 24 MMOL/L (ref 21–32)
CREAT SERPL-MCNC: 0.81 MG/DL (ref 0.6–1.3)
DIFFERENTIAL METHOD BLD: ABNORMAL
EOSINOPHIL # BLD: 0.4 K/UL (ref 0–0.4)
EOSINOPHIL NFR BLD: 5 % (ref 0–5)
ERYTHROCYTE [DISTWIDTH] IN BLOOD BY AUTOMATED COUNT: 20.3 % (ref 11.6–14.5)
GLUCOSE SERPL-MCNC: 80 MG/DL (ref 74–99)
HCT VFR BLD AUTO: 24.9 % (ref 35–45)
HCT VFR BLD AUTO: 27.7 % (ref 35–45)
HGB BLD-MCNC: 7.9 G/DL (ref 12–16)
HGB BLD-MCNC: 8.8 G/DL (ref 12–16)
IMM GRANULOCYTES # BLD AUTO: 0 K/UL (ref 0–0.04)
IMM GRANULOCYTES NFR BLD AUTO: 0 % (ref 0–0.5)
LYMPHOCYTES # BLD: 1.6 K/UL (ref 0.9–3.6)
LYMPHOCYTES NFR BLD: 24 % (ref 21–52)
MCH RBC QN AUTO: 26.9 PG (ref 24–34)
MCHC RBC AUTO-ENTMCNC: 31.7 G/DL (ref 31–37)
MCV RBC AUTO: 84.7 FL (ref 78–100)
MONOCYTES # BLD: 0.9 K/UL (ref 0.05–1.2)
MONOCYTES NFR BLD: 13 % (ref 3–10)
NEUTS SEG # BLD: 3.8 K/UL (ref 1.8–8)
NEUTS SEG NFR BLD: 57 % (ref 40–73)
NRBC # BLD: 0 K/UL (ref 0–0.01)
NRBC BLD-RTO: 0 PER 100 WBC
PLATELET # BLD AUTO: 289 K/UL (ref 135–420)
PMV BLD AUTO: 10 FL (ref 9.2–11.8)
POTASSIUM SERPL-SCNC: 3.4 MMOL/L (ref 3.5–5.5)
RBC # BLD AUTO: 2.94 M/UL (ref 4.2–5.3)
SODIUM SERPL-SCNC: 138 MMOL/L (ref 136–145)
WBC # BLD AUTO: 6.7 K/UL (ref 4.6–13.2)

## 2023-04-04 PROCEDURE — 99239 HOSP IP/OBS DSCHRG MGMT >30: CPT | Performed by: INTERNAL MEDICINE

## 2023-04-04 PROCEDURE — 6370000000 HC RX 637 (ALT 250 FOR IP): Performed by: INTERNAL MEDICINE

## 2023-04-04 PROCEDURE — 80048 BASIC METABOLIC PNL TOTAL CA: CPT

## 2023-04-04 PROCEDURE — 2580000003 HC RX 258: Performed by: STUDENT IN AN ORGANIZED HEALTH CARE EDUCATION/TRAINING PROGRAM

## 2023-04-04 PROCEDURE — G0378 HOSPITAL OBSERVATION PER HR: HCPCS

## 2023-04-04 PROCEDURE — 97165 OT EVAL LOW COMPLEX 30 MIN: CPT

## 2023-04-04 PROCEDURE — 30233N1 TRANSFUSION OF NONAUTOLOGOUS RED BLOOD CELLS INTO PERIPHERAL VEIN, PERCUTANEOUS APPROACH: ICD-10-PCS | Performed by: INTERNAL MEDICINE

## 2023-04-04 PROCEDURE — 6370000000 HC RX 637 (ALT 250 FOR IP): Performed by: STUDENT IN AN ORGANIZED HEALTH CARE EDUCATION/TRAINING PROGRAM

## 2023-04-04 PROCEDURE — 94640 AIRWAY INHALATION TREATMENT: CPT

## 2023-04-04 PROCEDURE — 36415 COLL VENOUS BLD VENIPUNCTURE: CPT

## 2023-04-04 PROCEDURE — 85025 COMPLETE CBC W/AUTO DIFF WBC: CPT

## 2023-04-04 PROCEDURE — 97535 SELF CARE MNGMENT TRAINING: CPT

## 2023-04-04 PROCEDURE — 97161 PT EVAL LOW COMPLEX 20 MIN: CPT

## 2023-04-04 PROCEDURE — 6360000002 HC RX W HCPCS: Performed by: STUDENT IN AN ORGANIZED HEALTH CARE EDUCATION/TRAINING PROGRAM

## 2023-04-04 PROCEDURE — 94761 N-INVAS EAR/PLS OXIMETRY MLT: CPT

## 2023-04-04 PROCEDURE — A4216 STERILE WATER/SALINE, 10 ML: HCPCS | Performed by: STUDENT IN AN ORGANIZED HEALTH CARE EDUCATION/TRAINING PROGRAM

## 2023-04-04 PROCEDURE — C9113 INJ PANTOPRAZOLE SODIUM, VIA: HCPCS | Performed by: STUDENT IN AN ORGANIZED HEALTH CARE EDUCATION/TRAINING PROGRAM

## 2023-04-04 PROCEDURE — 6370000000 HC RX 637 (ALT 250 FOR IP): Performed by: PHYSICIAN ASSISTANT

## 2023-04-04 PROCEDURE — 99232 SBSQ HOSP IP/OBS MODERATE 35: CPT | Performed by: INTERNAL MEDICINE

## 2023-04-04 PROCEDURE — 85014 HEMATOCRIT: CPT

## 2023-04-04 RX ORDER — LACTOBACILLUS ACIDOPHILUS / LACTOBACILLUS BULGARICUS 100 MILLION CFU STRENGTH
1 GRANULES ORAL 2 TIMES DAILY
Qty: 28 PACKET | Refills: 0 | Status: SHIPPED | OUTPATIENT
Start: 2023-04-04 | End: 2023-04-18

## 2023-04-04 RX ORDER — POTASSIUM CHLORIDE 20 MEQ/1
20 TABLET, EXTENDED RELEASE ORAL DAILY
Qty: 30 TABLET | Refills: 5 | Status: SHIPPED | OUTPATIENT
Start: 2023-04-04

## 2023-04-04 RX ORDER — CIPROFLOXACIN 500 MG/1
500 TABLET, FILM COATED ORAL EVERY 12 HOURS SCHEDULED
Qty: 14 TABLET | Refills: 0 | Status: SHIPPED | OUTPATIENT
Start: 2023-04-04 | End: 2023-04-11

## 2023-04-04 RX ORDER — METRONIDAZOLE 500 MG/1
500 TABLET ORAL EVERY 12 HOURS SCHEDULED
Qty: 14 TABLET | Refills: 0 | Status: SHIPPED | OUTPATIENT
Start: 2023-04-04 | End: 2023-04-11

## 2023-04-04 RX ORDER — DOXYCYCLINE HYCLATE 50 MG/1
324 CAPSULE, GELATIN COATED ORAL
Qty: 30 TABLET | Refills: 3 | Status: SHIPPED | OUTPATIENT
Start: 2023-04-04 | End: 2023-05-04

## 2023-04-04 RX ORDER — FUROSEMIDE 20 MG/1
20 TABLET ORAL DAILY
Qty: 60 TABLET | Refills: 0 | Status: SHIPPED | OUTPATIENT
Start: 2023-04-05

## 2023-04-04 RX ORDER — SODIUM CHLORIDE 9 MG/ML
INJECTION, SOLUTION INTRAVENOUS PRN
Status: DISCONTINUED | OUTPATIENT
Start: 2023-04-04 | End: 2023-04-04

## 2023-04-04 RX ADMIN — Medication 1 PACKET: at 00:00

## 2023-04-04 RX ADMIN — FUROSEMIDE 20 MG: 20 TABLET ORAL at 09:14

## 2023-04-04 RX ADMIN — SODIUM CHLORIDE, PRESERVATIVE FREE 10 ML: 5 INJECTION INTRAVENOUS at 09:29

## 2023-04-04 RX ADMIN — FLUTICASONE PROPIONATE 2 SPRAY: 50 SPRAY, METERED NASAL at 09:29

## 2023-04-04 RX ADMIN — BUDESONIDE 500 MCG: 0.5 INHALANT RESPIRATORY (INHALATION) at 09:07

## 2023-04-04 RX ADMIN — Medication 1 PACKET: at 09:14

## 2023-04-04 RX ADMIN — ARFORMOTEROL TARTRATE 15 MCG: 15 SOLUTION RESPIRATORY (INHALATION) at 09:07

## 2023-04-04 RX ADMIN — CARVEDILOL 25 MG: 25 TABLET, FILM COATED ORAL at 09:14

## 2023-04-04 RX ADMIN — CIPROFLOXACIN 500 MG: 250 TABLET, FILM COATED ORAL at 09:14

## 2023-04-04 RX ADMIN — PANTOPRAZOLE SODIUM 40 MG: 40 INJECTION, POWDER, FOR SOLUTION INTRAVENOUS at 09:15

## 2023-04-04 RX ADMIN — METRONIDAZOLE 500 MG: 500 TABLET ORAL at 09:14

## 2023-04-04 RX ADMIN — POTASSIUM BICARBONATE 40 MEQ: 782 TABLET, EFFERVESCENT ORAL at 09:14

## 2023-04-04 ASSESSMENT — PAIN SCALES - GENERAL: PAINLEVEL_OUTOF10: 0

## 2023-04-04 NOTE — CARE COORDINATION
D/C order noted for today. Orders reviewed. No needs identified at this time. The pt's family will transport home today. DME orders acknowledged. SW will order the rolling walker and bedside commode.      [employer]    [facility]

## 2023-04-04 NOTE — CARE COORDINATION
Case Management Assessment  Initial Evaluation    Date/Time of Evaluation: 4/4/2023 11:54 AM  Assessment Completed by: Eliane Rodrigues    If patient is discharged prior to next notation, then this note serves as note for discharge by case management. Patient Name: Kandy Lackey                   YOB: 1940  Diagnosis: GI bleed [K92.2]                   Date / Time: 4/1/2023 12:47 AM    Patient Admission Status: Inpatient   Readmission Risk (Low < 19, Mod (19-27), High > 27): Readmission Risk Score: 16.4    Current PCP: Julius Powell MD  PCP verified by CM? (P) Yes    Chart Reviewed: Yes      History Provided by: Patient  Patient Orientation: Alert and Oriented    Patient Cognition: Alert    Hospitalization in the last 30 days (Readmission):  No    If yes, Readmission Assessment in  Navigator will be completed. Advance Directives:      Code Status: Full Code   Patient's Primary Decision Maker is:        Discharge Planning:    Patient lives with: (P) Spouse/Significant Other Type of Home: (P) House  Primary Care Giver: Self  Patient Support Systems include: Family Members, Spouse/Significant Other   Current Financial resources: Medicare  Current community resources: None  Current services prior to admission: (P) None            Current DME:              Type of Home Care services:  (P) None    ADLS  Prior functional level: Independent in ADLs/IADLs  Current functional level: Independent in ADLs/IADLs    PT AM-PAC:   /24  OT AM-PAC:   /24    Family can provide assistance at DC: Yes  Would you like Case Management to discuss the discharge plan with any other family members/significant others, and if so, who?  No  Plans to Return to Present Housing: Yes  Other Identified Issues/Barriers to RETURNING to current housing: No  Potential Assistance needed at discharge: (P) [address] [place]            Potential DME: (P) Rey Stephenson  Patient expects to discharge to: (P) [facility]

## 2023-04-04 NOTE — PLAN OF CARE
Problem: Chronic Conditions and Co-morbidities  Goal: Patient's chronic conditions and co-morbidity symptoms are monitored and maintained or improved  4/2/2023 2318 by Keenan Alston RN  Outcome: Progressing  Flowsheets (Taken 4/2/2023 1910)  Care Plan - Patient's Chronic Conditions and Co-Morbidity Symptoms are Monitored and Maintained or Improved:   Monitor and assess patient's chronic conditions and comorbid symptoms for stability, deterioration, or improvement   Collaborate with multidisciplinary team to address chronic and comorbid conditions and prevent exacerbation or deterioration   Update acute care plan with appropriate goals if chronic or comorbid symptoms are exacerbated and prevent overall improvement and discharge  4/2/2023 1319 by Meme Perkins RN  Outcome: Progressing     Problem: Discharge Planning  Goal: Discharge to home or other facility with appropriate resources  Outcome: Progressing  Flowsheets (Taken 4/2/2023 1910)  Discharge to home or other facility with appropriate resources:   Identify barriers to discharge with patient and caregiver   Arrange for needed discharge resources and transportation as appropriate   Identify discharge learning needs (meds, wound care, etc)     Problem: Safety - Adult  Goal: Free from fall injury  4/2/2023 2318 by Keenan Alston RN  Outcome: Progressing  4/2/2023 1319 by Meme Perkins RN  Outcome: Progressing     Problem: Skin/Tissue Integrity  Goal: Absence of new skin breakdown  Description: 1. Monitor for areas of redness and/or skin breakdown  2. Assess vascular access sites hourly  3. Every 4-6 hours minimum:  Change oxygen saturation probe site  4. Every 4-6 hours:  If on nasal continuous positive airway pressure, respiratory therapy assess nares and determine need for appliance change or resting period.   4/2/2023 2318 by Keenan Alston RN  Outcome: Progressing  4/2/2023 1319 by Meme Perkins RN  Outcome: Progressing     Problem: ABCDS Injury
Problem: Chronic Conditions and Co-morbidities  Goal: Patient's chronic conditions and co-morbidity symptoms are monitored and maintained or improved  4/4/2023 0455 by Adele Ortega RN  Outcome: Progressing  4/3/2023 2048 by Sebastian Espinal RN  Outcome: Progressing  4 H Eyal oBoker (Taken 4/3/2023 2010 by Adele Ortega RN)  Care Plan - Patient's Chronic Conditions and Co-Morbidity Symptoms are Monitored and Maintained or Improved:   Monitor and assess patient's chronic conditions and comorbid symptoms for stability, deterioration, or improvement   Collaborate with multidisciplinary team to address chronic and comorbid conditions and prevent exacerbation or deterioration   Update acute care plan with appropriate goals if chronic or comorbid symptoms are exacerbated and prevent overall improvement and discharge     Problem: Discharge Planning  Goal: Discharge to home or other facility with appropriate resources  4/4/2023 0455 by Adele Ortega RN  Outcome: Progressing  4/3/2023 2048 by Sebastian Espinal RN  Outcome: Progressing  4 H Eyal Booker (Taken 4/3/2023 2010 by Adele Ortega RN)  Discharge to home or other facility with appropriate resources:   Identify barriers to discharge with patient and caregiver   Arrange for needed discharge resources and transportation as appropriate   Identify discharge learning needs (meds, wound care, etc)     Problem: Safety - Adult  Goal: Free from fall injury  4/4/2023 0455 by Adele Ortega RN  Outcome: Progressing  4/3/2023 2048 by Sebastian Espinal RN  Outcome: Progressing     Problem: Skin/Tissue Integrity  Goal: Absence of new skin breakdown  Description: 1. Monitor for areas of redness and/or skin breakdown  2. Assess vascular access sites hourly  3. Every 4-6 hours minimum:  Change oxygen saturation probe site  4. Every 4-6 hours:  If on nasal continuous positive airway pressure, respiratory therapy assess nares and determine need for appliance change or resting period.   4/4/2023 0455 by Adele Ortega
Problem: Chronic Conditions and Co-morbidities  Goal: Patient's chronic conditions and co-morbidity symptoms are monitored and maintained or improved  Outcome: Progressing     Problem: Discharge Planning  Goal: Discharge to home or other facility with appropriate resources  Outcome: Progressing     Problem: Safety - Adult  Goal: Free from fall injury  Outcome: Progressing     Problem: Skin/Tissue Integrity  Goal: Absence of new skin breakdown  Description: 1. Monitor for areas of redness and/or skin breakdown  2. Assess vascular access sites hourly  3. Every 4-6 hours minimum:  Change oxygen saturation probe site  4. Every 4-6 hours:  If on nasal continuous positive airway pressure, respiratory therapy assess nares and determine need for appliance change or resting period.   Outcome: Progressing     Problem: ABCDS Injury Assessment  Goal: Absence of physical injury  Outcome: Progressing     Problem: Nutrition Deficit:  Goal: Optimize nutritional status  Outcome: Progressing  Flowsheets (Taken 4/3/2023 1041 by Chuyita Chaudhary RD)  Nutrient intake appropriate for improving, restoring, or maintaining nutritional needs:   Assess nutritional status and recommend course of action   Monitor oral intake, labs, and treatment plans   Recommend appropriate diets, oral nutritional supplements, and vitamin/mineral supplements     Problem: Pain  Goal: Verbalizes/displays adequate comfort level or baseline comfort level  Outcome: Progressing
Problem: Chronic Conditions and Co-morbidities  Goal: Patient's chronic conditions and co-morbidity symptoms are monitored and maintained or improved  Outcome: Progressing     Problem: Safety - Adult  Goal: Free from fall injury  Outcome: Progressing     Problem: Skin/Tissue Integrity  Goal: Absence of new skin breakdown  Description: 1. Monitor for areas of redness and/or skin breakdown  2. Assess vascular access sites hourly  3. Every 4-6 hours minimum:  Change oxygen saturation probe site  4. Every 4-6 hours:  If on nasal continuous positive airway pressure, respiratory therapy assess nares and determine need for appliance change or resting period.   Outcome: Progressing     Problem: ABCDS Injury Assessment  Goal: Absence of physical injury  Outcome: Progressing     Problem: Nutrition Deficit:  Goal: Optimize nutritional status  Outcome: Progressing
Problem: Physical Therapy - Adult  Goal: By Discharge: Performs mobility at highest level of function for planned discharge setting. See evaluation for individualized goals. Description: Pt amb well with a RW in unit, not in need of acute PT. PLOF: Pt lives alone but her family rotates to stay with her. PCA Mon-fri to assist with household chores and some ADLs. She amb with a RW but uses her daughters RW, so she needs her own.        4/4/2023 1113 by Liz Pittman PT  Outcome: Adequate for Discharge
intervention: Nurse notified, See doc flow    Activity Tolerance:    Pt performed well  Please refer to the flowsheet for vital signs taken during this treatment. After treatment:   [x]         Patient left in no apparent distress sitting up in chair  []         Patient left in no apparent distress in bed  [x]         Call bell left within reach  [x]         Nursing notified  [x]         Caregiver present  []         Bed alarm activated  []         SCDs applied    COMMUNICATION/EDUCATION:   Patient Education  Education Given To: Patient  Education Provided: Role of Therapy;Plan of Care;Home Exercise Program  Education Method: Demonstration;Verbal;Teach Back  Barriers to Learning: None  Education Outcome: Verbalized understanding    Thank you for this referral.  Catarina Braden PT  Minutes: 8      Eval Complexity: [facility] AM-PAC® Basic Mobility Inpatient Short Form (6-Clicks) Version 2    How much HELP from another person does the patient currently need    (If the patient hasn't done an activity recently, how much help from another person do you think he/she would need if he/she tried?)   Total (Total A or Dep)   A Lot  (Mod to Max A)   A Little (Sup or Min A)   None (Mod I to I)   Turning from your back to your side while in a flat bed without using bedrails? [] 1 [] 2 [] 3 [x] 4   2. Moving from lying on your back to sitting on the side of a flat bed without using bedrails? [] 1 [] 2 [] 3 [x] 4   3. Moving to and from a bed to a chair (including a wheelchair)? [] 1 [] 2 [] 3 [x] 4   4. Standing up from a chair using your arms (e.g., wheelchair, or bedside chair)? [] 1 [] 2 [x] 3 [] 4   5. Walking in hospital room? [] 1 [] 2 [x] 3 [] 4   6. Climbing 3-5 steps with a railing?+   [] 1 [] 2 [x] 3 [] 4   +If stair climbing cannot be assessed, skip item #6. Sum responses from items 1-5.      At this time and based on an AM-PAC score of 21/24 (or **/20 if

## 2023-04-04 NOTE — CARE COORDINATION
[facility] ordered DME and will be delivered to pt's residence. Juni Werner requires a bedside commode due to being confined to one level of the home, and is physically incapable of utilizing regular toilet facilities. Current body weight is Weight: 142 lb (64.4 kg). Juni Werner was evaluated today and a DME order was entered for a wheeled walker because she requires this to successfully complete daily living tasks of ambulating. A wheeled walker is necessary due to the patient's unsteady gait, upper body weakness, and inability to  an ambulation device; and she can ambulate only by pushing a walker instead of a lesser assistive device such as a cane, crutch, or standard walker. The need for this equipment was discussed with the patient and she understands and is in agreement.      JEM Marquez    [facility]

## 2023-04-06 LAB
ABO + RH BLD: NORMAL
BLD PROD TYP BPU: NORMAL
BLD PROD TYP BPU: NORMAL
BLOOD BANK DISPENSE STATUS: NORMAL
BLOOD BANK DISPENSE STATUS: NORMAL
BLOOD GROUP ANTIBODIES SERPL: NORMAL
BPU ID: NORMAL
BPU ID: NORMAL
CALLED TO: NORMAL
CALLED TO:: NORMAL
CROSSMATCH RESULT: NORMAL
CROSSMATCH RESULT: NORMAL
SPECIMEN EXP DATE BLD: NORMAL
UNIT DIVISION: 0
UNIT DIVISION: 0

## 2023-04-06 NOTE — PROGRESS NOTES
0730 Received report from [address] [facility]. Patient alert and oriented x 4. Patient assisted to use the bathroom, able to ambulate with walker. 1130 Patient ambulates to the bathroom. Denies any pain at this time. Call bell and telephone placed within reach. 1500 Family and visitors at bedside. 1940  Bedside and Verbal shift change report given to [facility] RN (oncoming nurse). Report included the following information Nurse Handoff Report, Recent Results, and Cardiac Rhythm NSR .
1400 Patient off the floor to Endoscopy Unit for EGD.
1900: Bedside and Verbal shift change report given to LOPEZ Salas (oncoming nurse) by Kary Landers RN (offgoing nurse). Report included the following information Nurse Handoff Report, MAR, and Cardiac Rhythm NSR .     1939: Was told in report that patient was ordered a unit of blood for tonight due to HGB of 7.2 and pre-emptive for colonoscopy procedure tomorrow. Checked labs and HGB is now 8.0 per 15:59 labs. Entered order for type and screen. Called phlebotomy for them to come collect type and screen lab. Spoke with Dr Tootie Dubon and updated her about HGB and inquired about giving unit of blood. Per Dr Tootie Dubon, not to administer unit of blood at this time and monitor H&H.     2045: Informed blood bank that patient did not need unit of blood at this time. 2100: Patient sitting up on side of bed using cell phone. Patient drinking GoLytely in preparation for colonoscopy for tomorrow. Medications administered. Vitals WDL. No complaints of pain. Informed patient about lab results and that she would not need a unit of blood at this time. Pt alert and oriented x 4 and in good spirits. Provided patient with PO fluids, tolerated well. Pt informed that she will be NPO at midnight. Pt verbalized understanding. Warm blanket provided. Patient laid supine and lights dimmed for comfort. 2325: Provided patient with another warm blanket, upon entering room patient was using bedside commode independently. Vitals WDL     0250:Patient given GoLytely jug for colon prep. Blood transfusion consent form signed in the event that patient needs blood. Type and screen has already been completed. Warm blanket provided. 0700: Bedside and Verbal shift change report given to Kary Landers RN (oncoming nurse) by Andrea Ball RN (offgoing nurse). Report included the following information Nurse Handoff Report, MAR, and Cardiac Rhythm NSR .
2016: TRANSFER - IN REPORT:    Verbal report received from Julius Renee RN on [place] being received from [facility] (ED) for routine progression of care. Report consisted of patients Situation, Background, Assessment and Recommendations(SBAR). Information from the following report(s) SBAR, Kardex, Intake/Output, MAR, Recent Results, and Cardiac Rhythm NSR  was reviewed. Opportunity for questions and clarification was provided. Assessment completed upon patients arrival to unit and care assumed. 0020: Patient received to room 2315. Patient connected to monitor, V/S obtained and assessment completed. A/O x4; No c/o pain or SOB at this time. 97% on RA. Plan of care reviewed. CHG bath done and burke care done. Gown,bed pad, linen, and bed sheet changed. No active bleeding noted. Patient oriented to room and call light. Patient aware to use call light to communicate any chest pain or needs. Admission skin assessment completed with LOPEZ Mcclelland and reveals the following: Skin intact; no pressure injuries or wounds noted. 0400: No changes from previous assessment. Pt sleeping comfortably. No distress noted at this time. Call light within reach. 0600: Pt sleeping. No distress noted at this time. 3063: Bedside shift change report given to [address] [place] [address] (oncoming nurse) by Stevo Nguyen (offgoing nurse). Report included the following information SBAR, Kardex, Intake/Output, MAR, Recent Results, and Cardiac Rhythm NSR .
[phone]    Gastroenterology follow up-Progress note    Impression:  1. Large-volume painless hematochezia-suspect diverticular bleed. Patient has had previous episode of diverticular bleed requiring endoscopic therapy in 2017. Appears that the bleeding seems to have stopped spontaneously since admission. 2.  Previous history of colon polyps-tubular adenoma noted in 2017 and 2015 on colonoscopy. 3.  COPD,  4. CHF with ejection fraction of 34%. 5.  Remote history of thromboembolism    Plan:  1. Patient would like to proceed with colonoscopy-appreciate cardiology evaluation-EKG/echo pending. If there are no contraindications and cardiology feels the patient is optimized for procedure, will proceed with colonoscopy. Please let me know if EKG/echo is read and what the cardiology team feels after that is completed. If procedure is to be done tomorrow, patient will need to start bowel prep by 4 PM today. Please inform me so that I can put appropriate orders. Otherwise, procedure will have to be done later in the week. Addendum: Cardiology note reviewed. Discussed risk/benefits/alternatives with pt-she wishes to proceed with diagnostic colonoscopy-will place orders for prep for tomorrow. Bowel prep orders entered. Chief Complaint: Follow-up for GI bleeding    Subjective: Denies any bleeding since admission. Would like to proceed with colonoscopy. ROS: Denies any fevers, chills, rash.      Eyes: conjunctiva normal, EOM normal   Neck: ROM normal, supple and trachea normal   Cardiovascular: heart normal, intact distal pulses, normal rate and regular rhythm   Pulmonary/Chest Wall: breath sounds normal and effort normal   Abdominal: appearance normal, bowel sounds normal and soft, non-acute, non-tender     Patient Active Problem List   Diagnosis    Asthma    Chronic obstructive pulmonary disease (HCC)    GERD (gastroesophageal reflux disease)    Thyroid disease    Arthritis    Hypertension
Assisted patient to bedside commode. 300 cc urine output, yellow clear urine. Provided patient with lip moisturizer. Pt tolerated ambulating well.
[facility] Hospitalist Group  Progress Note    Patient: Mani Medina Age: 78 y.o. : 1940 MR#: [id number] SSN: [id number]  Date/Time: 4/3/2023    Subjective:       Assessment/Plan:   78year old female transferred from [facility] after she presented w/ reports of hematochezia. -GI bleed, likely lower GI, likely diverticular. s/p colonoscopy finding notable for diverticulum w/ blood clot and purulent d/c in the ascending colon, recs made for abx.  -Acute blood loss anemia: S/p PRBC transfusion. Hemodynamically stable. Hgb low at 6.8  -Hypokalemia: nl mag, normal  -Mild trop elevation: pt w/o c/o chest pain, EKG w/ unchanged diffuse T wave inversion, LVH, no clear evidence of ACS. Echo done -->EF20-25%, severe hypokinesis, anl diastolic dysfunction. HISTORY OF:  -COPD  -HFrEF -EF 34% in 2022, lower EF this admission. Per cardiology note patient had refused AICD in the past, currently compensated.  Started on daily lasix by cardiology  -HTN  -Thyroid disease  -Thromboembolism, distant    PLAN:  -Start cipro and flagyl, probiotic  -Volume replacement: PRBC as indicated, will hold off on IVF given hemodynamic stability and h/o HFrEF, hgb goal >8 given NSTEMI  -Replace and follow K  -Will cont coreg    Dispo: still requiring blood transfusion, will have PT/OT eval      Additional Notes:      Case discussed with:  [x]Patient  []Family  []Nursing  []Case Management  DVT Prophylaxis:  []Lovenox  []Hep SQ  [x]SCDs  []Coumadin   []On Heparin gtt    Objective:   VS: /68   Pulse 85   Temp 99.1 °F (37.3 °C)   Resp 16   Ht 5' 2\" (1.575 m)   Wt 142 lb (64.4 kg)   SpO2 95%   BMI 25.97 kg/m²    Tmax/24hrs: Temp (24hrs), Av.5 °F (36.9 °C), Min:97.6 °F (36.4 °C), Max:99.5 °F (37.5 °C)    Input/Output:   Intake/Output Summary (Last 24 hours) at 4/3/2023 2155  Last data filed at 4/3/2023 2020  Gross per 24 hour   Intake --   Output 1600 ml   Net -1600 ml       Genera:
[facility] Hospitalist Group  Progress Note    Patient: Rosmery Lilly Age: 78 y.o. : 1940 MR#: [id number] SSN: [id number]  Date/Time: 2023    Subjective:     Pt seen twice today. She states that she is passing stool with mucus, does not think there's any blood in mucus. Later on seen w/ g/daughter and g/daughter's . No specific complaints. Assessment/Plan:   78year old female transferred from [facility] after she presented w/ reports of hematochezia. -GI bleed, likely lower GI, likely diverticular. Discussed w/ GI specialist. Britta Townsend by cardiology to get colonoscopy, scheduled for tomorrow.  -Acute blood loss anemia: S/p PRBC transfusion. Hemodynamically stable. Last Hgb was around 8 later on today. -Hypokalemia: nl mag, normal, resolved  -Mild trop elevation: pt w/o c/o chest pain, EKG w/ unchanged diffuse T wave inversion, LVH, no clear evidence of ACS. Echo done today -->EF20-25%, severe hypokinesis, anl diastolic dysfunction. HISTORY OF:  -COPD  -HFrEF -EF 34% in 2022, lower EF this admission. Per cardiology note patient had refused AICD in the past, currently compensated. Started on daily lasix by cardiology  -HTN  -Thyroid disease  -Thromboembolism, distant    PLAN:  -Volume replacement: PRBC as indicated, will hold off on IVF given hemodynamic stability and h/o HFrEF  -Replace and follow K  -Unable to give asa for type II NSTEMI given her gi bleed, will cont coreg  -Colonoscopy likely tomorrow.       Additional Notes:      Case discussed with:  [x]Patient  [x]Family  []Nursing  []Case Management  DVT Prophylaxis:  []Lovenox  []Hep SQ  [x]SCDs  []Coumadin   []On Heparin gtt    Objective:   VS: /69   Pulse 72   Temp 97.5 °F (36.4 °C) (Oral)   Resp 14   Ht 5' 2\" (1.575 m)   Wt 142 lb (64.4 kg)   SpO2 100%   BMI 25.97 kg/m²    Tmax/24hrs: Temp (24hrs), Av.2 °F (36.8 °C), Min:97.5 °F (36.4 °C), Max:98.7 °F (37.1 °C)    Input/Output:
[facility] - Progress Note    Admit Date: 4/1/2023  Attending Cardiologist: Dr. Garza     Assessment:     -Anemia in setting of GIB. S/p transfusion. H/o of GIB.   Colonoscopy 9/16/17 -- diverticulosis in sigmoid colon and descending colon, actively bleeding diverticulum s/p epinephrine injection, 4mm polyp in sigmoid polyp removed, non bleeding internal hemorrhoids  Endoscopy 11/13/2022: Hiatal hernia noted, superficial erosion with healing ulcers in stomach  Colonoscopy this admission, Moderate pan-diverticular disease, non-lumen distorting. There was one diverticulum in ascending colon with a blood clot and purulent discharge.One flat 4mm polyp near ICV with overlying mucus cap  -Indeterminate troponin, suspect type 2 demand in setting of anemia/GIB. Currently without any symptoms to suggest unstable angina.   -Chronic HFrEF. Euvolemic at this time.   -Non-Ischemic Cardiomyopathy, previously declined ICD in the past. Initially dx in 2017, EF 33% at the time.   Normal cors per cath 5/23/17    Echo 5/8/17 -- EF 20%, stage III diastolic dysfunction, PAP 44mmHg  Echo 9/2/2022: EF 34%, moderate cLVH, grade 2 DD, left atrium severely dilated  Echo this admission, EF 25%.   -NST on 3/4/2020 was high risk with high-grade occlusion to proximal LAD. She did not undergo cardiac cath at that time.   -ASD, declined repair per primary Cardiologist.   -HLD, on statin as outpatient.   -Gout   -Ambulates with walker/cane.         Primary cardiologist is Dr. Melissa     Plan:     -Doing well s/p colonoscopy yesterday. Findings as noted above.  Respiratory and volume status stable. Continue maintenance lasix.   -Anemia mgmt per primary team, recommend maintaining Hgb > 8.0 from a CV standpoint given underlying CMY.   -GMDT, continued on Coreg, aldactone, losartan.   -Plan for patient to follow up with her primary cardiologist in 3-4 weeks.   Will sign off and be available as needed. 
Comprehensive Nutrition Assessment    Type and Reason for Visit:  Reassess, Positive Nutrition Screen, NPO/Clear Liquid    Nutrition Recommendations/Plan:   Continue current diet as tolerated, advancing when medically feasible to meet nutrition needs. Order Ensure Clear (each provides 240 kcal, 8g protein) TID. Continue to monitor intake/tolerance of PO, compliance of oral supplements, weight, labs, and plan of care during admission. Malnutrition Assessment:  Malnutrition Status:  Mild malnutrition (04/01/23 1136)    Context:  Acute Illness     Findings of the 6 clinical characteristics of malnutrition:  Energy Intake:  50% or less of estimated energy requirements for 5 or more days  Weight Loss:  No significant weight loss     Body Fat Loss:  Mild body fat loss Orbital   Muscle Mass Loss:  Mild muscle mass loss Temples (temporalis)  Fluid Accumulation:  Unable to assess     Strength:  Not Performed    Nutrition Assessment:    Admitted for suspected GIB. Diet advanced to clear liquids 4/01, back to NPO this AM. Pt s/p diagnostic colonoscopy today, per GI. Diet advanced to clear liquids with note to advance as tolerated if no further bleeding noted. Will order Ensure Clear to promote PO intake. Nutrition Related Findings:    Last BM 4/02. Labs (4/02): Ca (8.4) L. Pertinent meds: Lasix, Protonix, Effer-K 40 mEq, Aldactone. Wound Type: None       Current Nutrition Intake & Therapies:    Average Meal Intake:  (diet clear liquids or NPO since admission)  Average Supplements Intake: None Ordered  ADULT DIET; Clear Liquid    Anthropometric Measures:  Height: 5' 2\" (157.5 cm)  Ideal Body Weight (IBW): 110 lbs (50 kg)    Admission Body Weight: 142 lb 13.7 oz (64.8 kg) (64.8 kg)  Current Body Weight: 142 lb (64.4 kg) (4/02), 131.5 % IBW. Weight Source: Not Specified  Current BMI (kg/m2): 26  BMI Categories: Overweight (BMI 25.0-29. 9)    Estimated Daily Nutrient Needs:  Energy Requirements Based On:
MD requested staff to confirm pt med list.    Current daily meds-    Fluticasion Propionate/ Slameterol  250 mg/50 mcg 2x daily    Albuterol sulfate 90 ,cg Q4 hrs PRN    Fluticasone propionate- nasal spray 50 mcg 2x daily    Carboxymethcellulose sodium eye drops 0.5 % PRN    Alprazolam 0.25 mg 2x daily    Spironolactone 25 mg 1x daily    Amlodipine 5 mg 1x daily    Carvedilol 25 mg 2x daily    Eye drops Carboxymethcellulose sodium 1% glycerin 0.9% PRN    PRN meds    Colchicine 0.6 Mg PRN for gout symptoms    Pantoprazole 40 mg PRN for reflux    Acetaminophen 325 mg PRN for pain.       Pt does have these meds at the bedside but has not been taking them while in the hospital.
Physician Progress Note      PATIENTEnrico Kanner  CSN #:                  [id number]  :                       1940  ADMIT DATE:       2023 12:47 AM  DISCH DATE:        2023 2:30 PM  RESPONDING  PROVIDER #:        Cori Ledezma MD          QUERY TEXT:    Patient admitted with diverticulosis of large intestine with gastrointestinal   bleeding. Noted documentation of type II MI in progress note on 2023 per   Dr. Lea Ta. In order to support the diagnosis of type II MI, please   refer to 4th universal definition of MI below and include additional clinical   indicators in your documentation. ? Or please document if the diagnosis of type   II MI has been ruled out after study. ? The medical record reflects the following:  ?? Risk Factors: ASD but declined repair per primary cardiologist, anemia with   transfusion. ?? Clinical Indicators:  2023, PN, Dr. Lea Ta: \"Anemia in setting of GIB. S/p transfusion. H/o of GIB. Indeterminate troponin, suspect type 2 demand in setting of   anemia/GIB. ? Currently without any symptoms to suggest unstable angina. Chronic HFrEF. Euvolemic at this time. ?Non-Ischemic Cardiomyopathy, previously   declined ICD in the past. Initially dx in 2017, EF 33% at the time. ?NST on   3/4/2020 was high risk with high-grade occlusion to proximal LAD. She did not   undergo cardiac cath at that time.   ASD, declined repair per primary   Cardiologist.\"  troponin high sensitivity:  150 ---> 138 on 3/31/2023.  2023, ECG report:  23 1044  Ventricular Rate BPM 73  Atrial Rate BPM 73  P-R Interval ms 206  QRS Duration ms 82  Q-T Interval ms 428  QTc Calculation (Bazett) ms 471  P Axis degrees 60  R Axis degrees 18  T Axis degrees 225  Diagnosis  Normal sinus rhythm  Cannot rule out Anterior infarct , age undetermined  T wave abnormality, consider inferolateral ischemia  Abnormal ECG  When compared with ECG of 2022 07:50,  premature
SBAR report taken from April RN, off going nurse. Two man skin assessment performed with Kirby Zheng RN. No new skin issues identified. 4689- Pt in bed alert and oriented times 4 with bed locked and low and call bell in reach. Pt medicated per MAR.     1206-Pt in bed alert and oriented times 4 with bed locked and low and call bell in reach. 18- Pt educated on AVS and prepped for discharge. 1410- Pt off unit for discharge.
SBAR report taken from off going nurse [facility]. Two man skin assessment performed with Daron Hart RN. No new skin issues identified. 0349- Pt in bed alert and oriented times 4. Bed is locked and low with call bell in reach. Bed alarm is on, Pt has two family members at the bedside. Pt medicated per MAR. Pt up to bedside commode independently. Pt has completed bowel prep for colonoscopy. 0837- Dr. Haven Aviles at the Pts bedside. 1215- Pt in bed alert and oriented times 4. Bed is locked and low with call bell in reach. Bed alarm is on, Pt has two family members at the bedside. Pt up to bedside commode independently. 1342- Preop report called to Rodolfo Hoyos in endo. 1355- Pt off floor to endo. 1510- Post op report called by Kwaku Mehta RN in endo. Vitals stable, pt wake and oriented x 4, no bleeding found, pt positive for diverticulosis. Propofol was used for sedation. 1545- Pt back on floor from endo. Vitals stable. Pt somnolent but responsive and eyes open to verbal stimuli, oriented x4. ed is locked and low with call bell in reach. Bed alarm is on, Pt has two family members at the bedside. Due to unidentified milky white fluid in IV line a new 22 gauge PIV was placed in the pts left wrist. The old IV in the right forearm was DCed. 1745- Pt in bed dozing. Awakens to verbal stimuli, oriented x4. Bed locked and low with call bell in reach. Pt had bladder and bowel incontinence, just a smear of a bm. Pt cleaned up, pericare provided and bed linens and gown changed. The pt had a frequent persistent and productive cough with wheezing, pt requested duoneb. Pt medicated per MAR and set up for dinner tray. 5- SBAR report given to April LOPEZ, oncoming nurse.
SBAR report taken from off going nurse Princess MARROQUIN. Two man skin assessment performed with Sabina MARROQUIN. No new skin issues identified. 0845- Pt in bed alert and oriented x4 with bed locked and low with the call bell in reach. Pt denies pain. 4975- Pt in bed alert and oriented x4 with bed locked and low with the call bell in reach. Pt medicated MAR.      1102- Pt in bed alert and oriented x4 with bed locked and low with the call bell in reach. Pt medicated per MAR. Her granddaughter is at the bedside. Pt given neb treatment for wheezing. 1200- Pt in bed alert and oriented x4 with bed locked and low with the call bell in reach. Pt ambulated to toilet with standby assist and walker. No SOB, dizziness, palpitations or pain reported. Small soft stool with red streaks produced. Pt denies pain. Granddaughter still at bedside. 1252- Pt in bed alert and oriented x4 with bed locked and low with the call bell in reach. Pt medicated per MAR. Granddaughter still at bedside. 1437- Pt in bed alert and oriented x4 with bed locked and low with the call bell in reach. Pt ambulated to toilet. Small soft stool with red streaks produce. Granddaughter still at bedside. Pt decided to go forward with colonoscopy. Pt advanced to clear liquid diet. 1755-  Pt in bed alert and oriented x4 with bed locked and low with the call bell in reach. Pt medicated per MAR. Pt ambulated to toilet with standby assist and walker. No SOB, dizziness, palpitations or pain reported. Small soft stool with red streaks produced. Pt denies pain. SBAR report given to on coming nurse Noemi MARROQUIN.
SBAR report was given by Renetta Gandhi RN off going nurse to Adele MARCUS RN on coming nurse. Patient is up in her bed resting at start of shift. Nurse introducing self for the shift. Patient denies pain or discomfort. Nurse spoke to patient and her daughter who happen to called, nurse informed her that patient will be receiving a unit of blood. 2142: Nurse starts unit of blood @ 75ML/HR. After 15 mins no reported transfusion reaction. Rate was increased to 125ml/hr then to 150 ml/hr. Patient completed unit of blood with no problems. Nurse provided music for the patient for a few hours during the night for patient to rest well. Nurse notes that the music therapy was effective. Patient did have a low grade temp 99.1. Patient was started on some PO abx this evening. SBAR repot was given to Renetta Gandhi RN on coming nurse.
[facility] Renal Dosing Services    Pharmacist Renal Dosing Note for Ciprofloxacin   Physician/Prescriber:  Dr. Denver Gip    Indication:  Diverticulitis   Recommended Dose:  500 mg q12h   Serum Creatinine No results found for: NATALIA, CREA, CREAPOC   Creatinine Clearance Estimated Creatinine Clearance: 42 mL/min (based on SCr of 0.86 mg/dL). BUN Lab Results   Component Value Date/Time    BUN 7 04/02/2023 05:27 AM           Pharmacy to order Ciprofloxacin (PO) 500 mg every 12 hours    Pharmacy to continue to monitor patient daily. Will make dosage adjustments based upon changing renal function. Signed Didi Mcclelland, [facility].
Substitution Information per the P&T Committee approved Therapeutic Interchanges Policy     Medication Ordered Preferred Medication Dispensed    Symbicort® (budesonide/formoterol) 160/4.5 mcg BID Pulmicort Respules® (budesonide) 0.5mg/2ml BID  +  Arformoterol 15mcg/2ml BID
Admission Body Weight: 142 lb 13.7 oz (64.8 kg) (64.8 kg)  Current Body Weight: 144 lb 10 oz (65.6 kg) (taken by RD), 131.5 % IBW. Current BMI (kg/m2): 26.4  BMI Categories: Overweight (BMI 25.0-29. 9)    Estimated Daily Nutrient Needs:  Energy Requirements Based On: Formula  Weight Used for Energy Requirements: Admission  Energy (kcal/day): 1569-9658 (MSJ 1.2-1.4)  Weight Used for Protein Requirements: Admission  Protein (g/day): 65-78 (1-1.2 g/day)  Method Used for Fluid Requirements: 1 ml/kcal  Fluid (ml/day): 1527-1502    Nutrition Diagnosis:   Inadequate oral intake related to acute injury/trauma, early satiety as evidenced by poor intake prior to admission, NPO or clear liquid status due to medical condition    Nutrition Interventions:   Food and/or Nutrient Delivery: Continue NPO  Nutrition Education/Counseling: No recommendation at this time  Coordination of Nutrition Care: Continue to monitor while inpatient  Plan of Care discussed with: pt, family    Goals:     Goals: Meet at least 75% of estimated needs, by next RD assessment       Nutrition Monitoring and Evaluation:   Behavioral-Environmental Outcomes: None Identified  Food/Nutrient Intake Outcomes: Diet Advancement/Tolerance, Food and Nutrient Intake, Supplement Intake  Physical Signs/Symptoms Outcomes: Biochemical Data, GI Status, Chewing or Swallowing, Fluid Status or Edema, Hemodynamic Status, Meal Time Behavior, Weight    Discharge Planning:     Too soon to determine     Eloina Soliman [address] [place] [address]   Contact: [phone]
Thyroid disease     cyst on thyroid     Past Surgical History:   Procedure Laterality Date    APPENDECTOMY  age 23    COLONOSCOPY N/A 4/3/2023    COLONOSCOPY performed by Carie Escobar MD at [facility] ([address] [place] [address])  age 35     Park Maxim Left age 23    inguinal    Vamshi Dec Left 2011    Dr. Molly Forrest Situation:   Social/Functional History  Lives With: Spouse  Type of Home: House  Home Layout: One level  Home Access: Level entry  ADL Assistance: Needs assistance  Ambulation Assistance: Independent  Transfer Assistance: Independent  [x]  Right hand dominant   []  Left hand dominant    Cognitive/Behavioral Status:  Orientation  Orientation Level: Oriented to place;Oriented to situation;Oriented to person    Skin: Intact  Edema: None noted    Vision/Perceptual:    Vision  Vision: Within Functional Limits       Coordination: BUE  Coordination: Within functional limits    Balance:  Balance  Sitting: Intact  Standing: Impaired  Standing - Static: Good  Standing - Dynamic: Fair    Strength: BUE  Strength: Generally decreased, functional    Tone & Sensation: BUE  Tone: Normal  Sensation: Intact    Range of Motion: BUE  AROM: Within functional limits    Functional Mobility and Transfers for ADLs:  Bed Mobility:  Bed Mobility Training  Bed Mobility Training: Yes  Supine to Sit: Modified independent  Scooting: Modified independent    Transfers:  Transfer Training  Transfer Training: Yes  Overall Level of Assistance: Supervision  Sit to Stand: Supervision  Stand to Sit: Supervision    ADL Assessment:   Feeding: Independent  Grooming: Independent  UE Bathing: Modified independent   LE Bathing: Modified independent   UE Dressing: Modified independent   LE Dressing: Modified independent   Toileting: Modified independent       Pain:  Pain level pre-treatment: 0/10   Pain level post-treatment: 0/10   Pain Intervention(s): Rest, Ice,
and will provide pastoral care on an as needed/requested basis    . Augusto Abbott   Spiritual Care   [phone]
%    Monocytes 10 3 - 10 %    Eosinophils % 7 (H) 0 - 5 %    Basophils 1 0 - 2 %    Immature Granulocytes 1 (H) 0.0 - 0.5 %    Segs Absolute 4.8 1.8 - 8.0 K/UL    Absolute Lymph # 2.1 0.9 - 3.6 K/UL    Absolute Mono # 0.9 0.05 - 1.2 K/UL    Absolute Eos # 0.6 (H) 0.0 - 0.4 K/UL    Basophils Absolute 0.1 0.0 - 0.1 K/UL    Absolute Immature Granulocyte 0.0 0.00 - 0.04 K/UL    Differential Type AUTOMATED     Basic Metabolic Panel    Collection Time: 04/01/23  8:32 AM   Result Value Ref Range    Sodium 142 136 - 145 mmol/L    Potassium 3.4 (L) 3.5 - 5.5 mmol/L    Chloride 112 (H) 100 - 111 mmol/L    CO2 24 21 - 32 mmol/L    Anion Gap 6 3.0 - 18 mmol/L    Glucose 82 74 - 99 mg/dL    BUN 8 7.0 - 18 MG/DL    Creatinine 0.88 0.6 - 1.3 MG/DL    Bun/Cre Ratio 9 (L) 12 - 20      Est, Glom Filt Rate >60 >60 ml/min/1.73m2    Calcium 8.4 (L) 8.5 - 10.1 MG/DL   Magnesium    Collection Time: 04/01/23  8:32 AM   Result Value Ref Range    Magnesium 2.1 1.6 - 2.6 mg/dL   Hemoglobin and Hematocrit    Collection Time: 04/01/23  1:03 PM   Result Value Ref Range    Hemoglobin 7.2 (L) 12.0 - 16.0 g/dL    Hematocrit 23.3 (L) 35.0 - 45.0 %     Additional Data Reviewed:      Signed By: Mohan Newby MD     April 1, 2023 3:06 PM
--     < > = values in this interval not displayed.       Cardiac Enzymes Lab Results   Component Value Date/Time    TROPHS 138 03/31/2023 03:15 PM    TROPHS 150 03/31/2023 12:04 PM    TROPHS 86 11/09/2022 02:40 PM      Coagulation Recent Labs     03/31/23  1204   INR 1.3*       Lipid Panel No results found for: CHOL, CHOLPOCT, CHOLX, CHLST, CHOLV, 121534, HDL, HDLC, LDL, LDLC, 829722, VLDLC, VLDL, TGLX, TRIGL   BNP Lab Results   Component Value Date     (H) 01/10/2022      Liver Enzymes Lab Results   Component Value Date    ALT 10 (L) 03/31/2023    AST 8 (L) 03/31/2023    ALKPHOS 73 03/31/2023    BILITOT 0.5 03/31/2023      Thyroid Studies No results found for: T4, TSH       Signed By: Néstor Hernandez PA-C     April 3, 2023

## 2023-05-02 ENCOUNTER — HOSPITAL ENCOUNTER (EMERGENCY)
Age: 83
Discharge: HOME | End: 2023-05-02
Attending: EMERGENCY MEDICINE
Payer: MEDICARE

## 2023-05-02 VITALS
OXYGEN SATURATION: 99 % | SYSTOLIC BLOOD PRESSURE: 139 MMHG | HEART RATE: 96 BPM | DIASTOLIC BLOOD PRESSURE: 78 MMHG | BODY MASS INDEX: 25.4 KG/M2 | TEMPERATURE: 98.2 F | RESPIRATION RATE: 22 BRPM | WEIGHT: 138 LBS | HEIGHT: 62 IN

## 2023-05-02 DIAGNOSIS — M25.50 ARTHRALGIA, UNSPECIFIED JOINT: Primary | ICD-10-CM

## 2023-05-02 PROCEDURE — 99283 EMERGENCY DEPT VISIT LOW MDM: CPT

## 2023-05-02 RX ORDER — HYDROCODONE BITARTRATE AND ACETAMINOPHEN 7.5; 325 MG/1; MG/1
1 TABLET ORAL EVERY 6 HOURS PRN
Qty: 12 TABLET | Refills: 0 | Status: SHIPPED | OUTPATIENT
Start: 2023-05-02 | End: 2023-05-05

## 2023-05-02 NOTE — ED TRIAGE NOTES
Has been having joint pains and body aches for the past 3 days, has been taking OTC meds and not getting better

## 2023-05-02 NOTE — ED PROVIDER NOTES
05/02/2023 10:19:45 AM      PATIENT REFERRED TO:  Liliam Hurst MD  [facility] [address]  [phone]    Schedule an appointment as soon as possible for a visit       Karen Berkowitz MD  [facility] [address]  [phone]    Schedule an appointment as soon as possible for a visit         DISCHARGE MEDICATIONS:  New Prescriptions    HYDROCODONE-ACETAMINOPHEN (NORCO) 7.5-325 MG PER TABLET    Take 1 tablet by mouth every 6 hours as needed for Pain for up to 3 days. Intended supply: 3 days. Take lowest dose possible to manage pain Max Daily Amount: 4 tablets     Controlled Substances Monitoring:     No flowsheet data found.     (Please note that portions of this note were completed with a voice recognition program.  Efforts were made to edit the dictations but occasionally words are mis-transcribed.)    Maru Nichole MD (electronically signed)  Attending Emergency Physician            Kimberly Cardoza MD  05/02/23 2671

## 2023-06-16 PROBLEM — I21.4 NSTEMI (NON-ST ELEVATED MYOCARDIAL INFARCTION) (HCC): Status: RESOLVED | Noted: 2022-09-03 | Resolved: 2023-06-16

## 2023-06-19 ENCOUNTER — FOLLOWUP TELEPHONE ENCOUNTER (OUTPATIENT)
Facility: HOSPITAL | Age: 83
End: 2023-06-19

## 2023-06-19 NOTE — FLOWSHEET NOTE
Patient was currently at therapy during the phone call. Evelio Funes answered the call. Stated the patient has been in pain in her foot since leaving the hospital.  Family member stated that the patient has a bone spur and chronic arthritis. Stated per her d/c instructions that the patient's tylenol had been discontinued. Encouraged the patient/family to contact the primary care giver regarding pain management. Patient/family states that she has an appointment in the morning. This RN still encouraged the family to make a phone call today regarding pain management to the PCP. Stated that she would.

## 2023-10-29 ENCOUNTER — APPOINTMENT (OUTPATIENT)
Age: 83
DRG: 291 | End: 2023-10-29
Payer: MEDICARE

## 2023-10-29 ENCOUNTER — HOSPITAL ENCOUNTER (INPATIENT)
Age: 83
LOS: 1 days | Discharge: HOME | DRG: 291 | End: 2023-10-30
Attending: FAMILY MEDICINE | Admitting: INTERNAL MEDICINE
Payer: MEDICARE

## 2023-10-29 DIAGNOSIS — I50.43 ACUTE ON CHRONIC COMBINED SYSTOLIC AND DIASTOLIC CHF (CONGESTIVE HEART FAILURE) (HCC): Primary | ICD-10-CM

## 2023-10-29 DIAGNOSIS — J44.1 COPD EXACERBATION (HCC): ICD-10-CM

## 2023-10-29 DIAGNOSIS — I42.8 NICM (NONISCHEMIC CARDIOMYOPATHY) (HCC): ICD-10-CM

## 2023-10-29 LAB
ABO + RH BLD: NORMAL
ALBUMIN SERPL-MCNC: 2.8 G/DL (ref 3.4–5)
ALBUMIN/GLOB SERPL: 0.7 (ref 0.8–1.7)
ALP SERPL-CCNC: 80 U/L (ref 45–117)
ALT SERPL-CCNC: 22 U/L (ref 13–56)
ANION GAP SERPL CALC-SCNC: 7 MMOL/L (ref 3–18)
APPEARANCE UR: CLEAR
AST SERPL W P-5'-P-CCNC: 12 U/L (ref 10–38)
BASOPHILS # BLD: 0.1 K/UL (ref 0–0.1)
BASOPHILS NFR BLD: 0 % (ref 0–2)
BILIRUB SERPL-MCNC: 0.5 MG/DL (ref 0.2–1)
BILIRUB UR QL: NEGATIVE
BLOOD GROUP ANTIBODIES SERPL: NEGATIVE
BNP SERPL-MCNC: ABNORMAL PG/ML (ref 0–1800)
BUN SERPL-MCNC: 14 MG/DL (ref 7–18)
BUN/CREAT SERPL: 14 (ref 12–20)
CA-I BLD-MCNC: 9 MG/DL (ref 8.5–10.1)
CHLORIDE SERPL-SCNC: 104 MMOL/L (ref 100–111)
CO2 SERPL-SCNC: 30 MMOL/L (ref 21–32)
COLLECT DATE STL: NORMAL
COLOR UR: YELLOW
CREAT SERPL-MCNC: 1.01 MG/DL (ref 0.6–1.3)
D DIMER PPP FEU-MCNC: 1.09 UG/ML(FEU)
DIFFERENTIAL METHOD BLD: ABNORMAL
EKG DIAGNOSIS: NORMAL
EKG Q-T INTERVAL: 364 MS
EKG QRS DURATION: 84 MS
EKG QTC CALCULATION (BAZETT): 471 MS
EKG R AXIS: 40 DEGREES
EKG T AXIS: 214 DEGREES
EKG VENTRICULAR RATE: 101 BPM
EOSINOPHIL # BLD: 0.1 K/UL (ref 0–0.4)
EOSINOPHIL NFR BLD: 1 % (ref 0–5)
ERYTHROCYTE [DISTWIDTH] IN BLOOD BY AUTOMATED COUNT: 16.3 % (ref 11.6–14.5)
GLOBULIN SER CALC-MCNC: 4.2 G/DL (ref 2–4)
GLUCOSE SERPL-MCNC: 126 MG/DL (ref 74–99)
GLUCOSE UR STRIP.AUTO-MCNC: NEGATIVE MG/DL
HCT VFR BLD AUTO: 32.1 % (ref 35–45)
HEMOCCULT SP1 STL QL: NEGATIVE
HGB BLD-MCNC: 10 G/DL (ref 12–16)
HGB UR QL STRIP: NEGATIVE
IMM GRANULOCYTES # BLD AUTO: 0.1 K/UL (ref 0–0.04)
IMM GRANULOCYTES NFR BLD AUTO: 0 % (ref 0–0.5)
KETONES UR QL STRIP.AUTO: NEGATIVE MG/DL
LACTATE SERPL-SCNC: 1.9 MMOL/L (ref 0.4–2)
LEUKOCYTE ESTERASE UR QL STRIP.AUTO: NEGATIVE
LIPASE SERPL-CCNC: 18 U/L (ref 13–75)
LYMPHOCYTES # BLD: 2 K/UL (ref 0.9–3.6)
LYMPHOCYTES NFR BLD: 15 % (ref 21–52)
MCH RBC QN AUTO: 26.8 PG (ref 24–34)
MCHC RBC AUTO-ENTMCNC: 31.2 G/DL (ref 31–37)
MCV RBC AUTO: 86.1 FL (ref 78–100)
MONOCYTES # BLD: 1.2 K/UL (ref 0.05–1.2)
MONOCYTES NFR BLD: 9 % (ref 3–10)
NEUTS SEG # BLD: 10.1 K/UL (ref 1.8–8)
NEUTS SEG NFR BLD: 75 % (ref 40–73)
NITRITE UR QL STRIP.AUTO: NEGATIVE
NRBC # BLD: 0 K/UL (ref 0–0.01)
NRBC BLD-RTO: 0 PER 100 WBC
PH UR STRIP: 8.5 (ref 5–8)
PLATELET # BLD AUTO: 455 K/UL (ref 135–420)
PMV BLD AUTO: 9.7 FL (ref 9.2–11.8)
POTASSIUM SERPL-SCNC: 3.6 MMOL/L (ref 3.5–5.5)
PROT SERPL-MCNC: 7 G/DL (ref 6.4–8.2)
PROT UR STRIP-MCNC: NEGATIVE MG/DL
RBC # BLD AUTO: 3.73 M/UL (ref 4.2–5.3)
SODIUM SERPL-SCNC: 141 MMOL/L (ref 136–145)
SP GR UR REFRACTOMETRY: 1.01 (ref 1–1.03)
SPECIMEN EXP DATE BLD: NORMAL
TROPONIN I SERPL HS-MCNC: 115 NG/L (ref 0–54)
TROPONIN I SERPL HS-MCNC: 134 NG/L (ref 0–54)
TROPONIN I SERPL HS-MCNC: 138 NG/L (ref 0–54)
TROPONIN I SERPL HS-MCNC: 139 NG/L (ref 0–54)
UROBILINOGEN UR QL STRIP.AUTO: 0.2 EU/DL (ref 0.2–1)
WBC # BLD AUTO: 13.5 K/UL (ref 4.6–13.2)

## 2023-10-29 PROCEDURE — 96374 THER/PROPH/DIAG INJ IV PUSH: CPT

## 2023-10-29 PROCEDURE — 94640 AIRWAY INHALATION TREATMENT: CPT

## 2023-10-29 PROCEDURE — 93005 ELECTROCARDIOGRAM TRACING: CPT | Performed by: FAMILY MEDICINE

## 2023-10-29 PROCEDURE — 82272 OCCULT BLD FECES 1-3 TESTS: CPT

## 2023-10-29 PROCEDURE — 96375 TX/PRO/DX INJ NEW DRUG ADDON: CPT

## 2023-10-29 PROCEDURE — 99285 EMERGENCY DEPT VISIT HI MDM: CPT

## 2023-10-29 PROCEDURE — 84484 ASSAY OF TROPONIN QUANT: CPT

## 2023-10-29 PROCEDURE — 94761 N-INVAS EAR/PLS OXIMETRY MLT: CPT

## 2023-10-29 PROCEDURE — 86901 BLOOD TYPING SEROLOGIC RH(D): CPT

## 2023-10-29 PROCEDURE — 83690 ASSAY OF LIPASE: CPT

## 2023-10-29 PROCEDURE — 6370000000 HC RX 637 (ALT 250 FOR IP): Performed by: NURSE PRACTITIONER

## 2023-10-29 PROCEDURE — 6360000002 HC RX W HCPCS: Performed by: NURSE PRACTITIONER

## 2023-10-29 PROCEDURE — 86850 RBC ANTIBODY SCREEN: CPT

## 2023-10-29 PROCEDURE — 87040 BLOOD CULTURE FOR BACTERIA: CPT

## 2023-10-29 PROCEDURE — 1100000000 HC RM PRIVATE

## 2023-10-29 PROCEDURE — G0238 OTH RESP PROC, INDIV: HCPCS

## 2023-10-29 PROCEDURE — 85025 COMPLETE CBC W/AUTO DIFF WBC: CPT

## 2023-10-29 PROCEDURE — G0378 HOSPITAL OBSERVATION PER HR: HCPCS

## 2023-10-29 PROCEDURE — 83880 ASSAY OF NATRIURETIC PEPTIDE: CPT

## 2023-10-29 PROCEDURE — 2580000003 HC RX 258: Performed by: NURSE PRACTITIONER

## 2023-10-29 PROCEDURE — 71045 X-RAY EXAM CHEST 1 VIEW: CPT

## 2023-10-29 PROCEDURE — 96376 TX/PRO/DX INJ SAME DRUG ADON: CPT

## 2023-10-29 PROCEDURE — 96372 THER/PROPH/DIAG INJ SC/IM: CPT

## 2023-10-29 PROCEDURE — 86900 BLOOD TYPING SEROLOGIC ABO: CPT

## 2023-10-29 PROCEDURE — 6360000002 HC RX W HCPCS: Performed by: EMERGENCY MEDICINE

## 2023-10-29 PROCEDURE — 80053 COMPREHEN METABOLIC PANEL: CPT

## 2023-10-29 PROCEDURE — 6370000000 HC RX 637 (ALT 250 FOR IP): Performed by: EMERGENCY MEDICINE

## 2023-10-29 PROCEDURE — 83605 ASSAY OF LACTIC ACID: CPT

## 2023-10-29 PROCEDURE — 81003 URINALYSIS AUTO W/O SCOPE: CPT

## 2023-10-29 PROCEDURE — 2700000000 HC OXYGEN THERAPY PER DAY

## 2023-10-29 PROCEDURE — 85379 FIBRIN DEGRADATION QUANT: CPT

## 2023-10-29 PROCEDURE — 2580000003 HC RX 258: Performed by: EMERGENCY MEDICINE

## 2023-10-29 RX ORDER — ONDANSETRON 2 MG/ML
4 INJECTION INTRAMUSCULAR; INTRAVENOUS EVERY 6 HOURS PRN
Status: DISCONTINUED | OUTPATIENT
Start: 2023-10-29 | End: 2023-10-30 | Stop reason: HOSPADM

## 2023-10-29 RX ORDER — IPRATROPIUM BROMIDE AND ALBUTEROL SULFATE 2.5; .5 MG/3ML; MG/3ML
1 SOLUTION RESPIRATORY (INHALATION) EVERY 4 HOURS PRN
Status: DISCONTINUED | OUTPATIENT
Start: 2023-10-29 | End: 2023-10-30 | Stop reason: HOSPADM

## 2023-10-29 RX ORDER — IPRATROPIUM BROMIDE AND ALBUTEROL SULFATE 2.5; .5 MG/3ML; MG/3ML
1 SOLUTION RESPIRATORY (INHALATION)
Status: COMPLETED | OUTPATIENT
Start: 2023-10-29 | End: 2023-10-29

## 2023-10-29 RX ORDER — CARVEDILOL 12.5 MG/1
25 TABLET ORAL 2 TIMES DAILY WITH MEALS
Status: DISCONTINUED | OUTPATIENT
Start: 2023-10-29 | End: 2023-10-30 | Stop reason: HOSPADM

## 2023-10-29 RX ORDER — LOSARTAN POTASSIUM 25 MG/1
25 TABLET ORAL DAILY
Status: DISCONTINUED | OUTPATIENT
Start: 2023-10-30 | End: 2023-10-30 | Stop reason: HOSPADM

## 2023-10-29 RX ORDER — ACETAMINOPHEN 325 MG/1
650 TABLET ORAL EVERY 6 HOURS PRN
Status: DISCONTINUED | OUTPATIENT
Start: 2023-10-29 | End: 2023-10-30 | Stop reason: HOSPADM

## 2023-10-29 RX ORDER — SODIUM CHLORIDE 0.9 % (FLUSH) 0.9 %
5-40 SYRINGE (ML) INJECTION EVERY 12 HOURS SCHEDULED
Status: DISCONTINUED | OUTPATIENT
Start: 2023-10-29 | End: 2023-10-30 | Stop reason: HOSPADM

## 2023-10-29 RX ORDER — ACETAMINOPHEN 650 MG/1
650 SUPPOSITORY RECTAL EVERY 6 HOURS PRN
Status: DISCONTINUED | OUTPATIENT
Start: 2023-10-29 | End: 2023-10-30 | Stop reason: HOSPADM

## 2023-10-29 RX ORDER — AZITHROMYCIN 250 MG/1
250 TABLET, FILM COATED ORAL DAILY
Status: ON HOLD | COMMUNITY
End: 2023-10-30 | Stop reason: HOSPADM

## 2023-10-29 RX ORDER — POTASSIUM CHLORIDE 750 MG/1
20 TABLET, EXTENDED RELEASE ORAL DAILY
Status: DISCONTINUED | OUTPATIENT
Start: 2023-10-29 | End: 2023-10-30 | Stop reason: HOSPADM

## 2023-10-29 RX ORDER — FLUTICASONE PROPIONATE 50 MCG
2 SPRAY, SUSPENSION (ML) NASAL DAILY
Status: DISCONTINUED | OUTPATIENT
Start: 2023-10-30 | End: 2023-10-30 | Stop reason: HOSPADM

## 2023-10-29 RX ORDER — POLYETHYLENE GLYCOL 3350 17 G/17G
17 POWDER, FOR SOLUTION ORAL DAILY PRN
Status: DISCONTINUED | OUTPATIENT
Start: 2023-10-29 | End: 2023-10-30 | Stop reason: HOSPADM

## 2023-10-29 RX ORDER — ALPRAZOLAM 0.25 MG/1
0.25 TABLET ORAL EVERY 12 HOURS PRN
Status: DISCONTINUED | OUTPATIENT
Start: 2023-10-29 | End: 2023-10-30 | Stop reason: HOSPADM

## 2023-10-29 RX ORDER — ATORVASTATIN CALCIUM 40 MG/1
40 TABLET, FILM COATED ORAL NIGHTLY
Status: DISCONTINUED | OUTPATIENT
Start: 2023-10-29 | End: 2023-10-30 | Stop reason: HOSPADM

## 2023-10-29 RX ORDER — ONDANSETRON 4 MG/1
4 TABLET, ORALLY DISINTEGRATING ORAL EVERY 8 HOURS PRN
Status: DISCONTINUED | OUTPATIENT
Start: 2023-10-29 | End: 2023-10-30 | Stop reason: HOSPADM

## 2023-10-29 RX ORDER — FUROSEMIDE 10 MG/ML
40 INJECTION INTRAMUSCULAR; INTRAVENOUS ONCE
Status: COMPLETED | OUTPATIENT
Start: 2023-10-29 | End: 2023-10-29

## 2023-10-29 RX ORDER — ASPIRIN 81 MG/1
81 TABLET ORAL DAILY
Status: DISCONTINUED | OUTPATIENT
Start: 2023-10-30 | End: 2023-10-30 | Stop reason: HOSPADM

## 2023-10-29 RX ORDER — PANTOPRAZOLE SODIUM 40 MG/1
40 TABLET, DELAYED RELEASE ORAL
Status: DISCONTINUED | OUTPATIENT
Start: 2023-10-30 | End: 2023-10-30 | Stop reason: HOSPADM

## 2023-10-29 RX ORDER — FUROSEMIDE 10 MG/ML
20 INJECTION INTRAMUSCULAR; INTRAVENOUS 2 TIMES DAILY
Status: DISCONTINUED | OUTPATIENT
Start: 2023-10-29 | End: 2023-10-30 | Stop reason: HOSPADM

## 2023-10-29 RX ORDER — GABAPENTIN 100 MG/1
100 CAPSULE ORAL
COMMUNITY

## 2023-10-29 RX ORDER — POTASSIUM CHLORIDE 1.5 G/1.58G
20 POWDER, FOR SOLUTION ORAL DAILY
COMMUNITY

## 2023-10-29 RX ORDER — SODIUM CHLORIDE 0.9 % (FLUSH) 0.9 %
5-40 SYRINGE (ML) INJECTION PRN
Status: DISCONTINUED | OUTPATIENT
Start: 2023-10-29 | End: 2023-10-30 | Stop reason: HOSPADM

## 2023-10-29 RX ORDER — MONTELUKAST SODIUM 10 MG/1
10 TABLET ORAL NIGHTLY
Status: DISCONTINUED | OUTPATIENT
Start: 2023-10-29 | End: 2023-10-30 | Stop reason: HOSPADM

## 2023-10-29 RX ORDER — PREDNISONE 10 MG/1
10 TABLET ORAL DAILY
COMMUNITY

## 2023-10-29 RX ORDER — ENOXAPARIN SODIUM 100 MG/ML
40 INJECTION SUBCUTANEOUS DAILY
Status: DISCONTINUED | OUTPATIENT
Start: 2023-10-29 | End: 2023-10-30 | Stop reason: HOSPADM

## 2023-10-29 RX ADMIN — MOMETASONE FUROATE AND FORMOTEROL FUMARATE DIHYDRATE 2 PUFF: 200; 5 AEROSOL RESPIRATORY (INHALATION) at 20:10

## 2023-10-29 RX ADMIN — SODIUM CHLORIDE, PRESERVATIVE FREE 10 ML: 5 INJECTION INTRAVENOUS at 11:44

## 2023-10-29 RX ADMIN — METHYLPREDNISOLONE SODIUM SUCCINATE 60 MG: 125 INJECTION INTRAMUSCULAR; INTRAVENOUS at 08:26

## 2023-10-29 RX ADMIN — MOMETASONE FUROATE AND FORMOTEROL FUMARATE DIHYDRATE 2 PUFF: 200; 5 AEROSOL RESPIRATORY (INHALATION) at 11:45

## 2023-10-29 RX ADMIN — WATER 40 MG: 1 INJECTION INTRAMUSCULAR; INTRAVENOUS; SUBCUTANEOUS at 17:50

## 2023-10-29 RX ADMIN — FUROSEMIDE 20 MG: 10 INJECTION, SOLUTION INTRAMUSCULAR; INTRAVENOUS at 17:50

## 2023-10-29 RX ADMIN — POTASSIUM CHLORIDE 20 MEQ: 750 TABLET, EXTENDED RELEASE ORAL at 11:44

## 2023-10-29 RX ADMIN — IPRATROPIUM BROMIDE AND ALBUTEROL SULFATE 1 DOSE: .5; 3 SOLUTION RESPIRATORY (INHALATION) at 08:25

## 2023-10-29 RX ADMIN — CARVEDILOL 25 MG: 12.5 TABLET, FILM COATED ORAL at 17:51

## 2023-10-29 RX ADMIN — SODIUM CHLORIDE, PRESERVATIVE FREE 10 ML: 5 INJECTION INTRAVENOUS at 20:29

## 2023-10-29 RX ADMIN — FUROSEMIDE 40 MG: 10 INJECTION, SOLUTION INTRAMUSCULAR; INTRAVENOUS at 08:23

## 2023-10-29 RX ADMIN — ATORVASTATIN CALCIUM 40 MG: 40 TABLET, FILM COATED ORAL at 20:28

## 2023-10-29 RX ADMIN — ENOXAPARIN SODIUM 40 MG: 100 INJECTION SUBCUTANEOUS at 11:44

## 2023-10-29 RX ADMIN — MONTELUKAST 10 MG: 10 TABLET, FILM COATED ORAL at 20:28

## 2023-10-29 ASSESSMENT — ENCOUNTER SYMPTOMS
SHORTNESS OF BREATH: 1
CHEST TIGHTNESS: 1
COUGH: 1
ABDOMINAL PAIN: 1

## 2023-10-29 ASSESSMENT — PAIN - FUNCTIONAL ASSESSMENT: PAIN_FUNCTIONAL_ASSESSMENT: 0-10

## 2023-10-29 ASSESSMENT — PAIN DESCRIPTION - DESCRIPTORS: DESCRIPTORS: TIGHTNESS

## 2023-10-29 ASSESSMENT — PAIN SCALES - GENERAL
PAINLEVEL_OUTOF10: 0
PAINLEVEL_OUTOF10: 0
PAINLEVEL_OUTOF10: 6
PAINLEVEL_OUTOF10: 0

## 2023-10-29 ASSESSMENT — PAIN DESCRIPTION - LOCATION: LOCATION: CHEST

## 2023-10-29 NOTE — PROGRESS NOTES
0930- patient arrived to room 244 via stretcher. Patient transferred self to bed by scooting. Patient is alert and oriented. Patient currently on 2L NC. Daughter and Son at bedside    [place] and brief applied. Rossana-care complete. 1130- lunch provided    1144- NP at bedside. RT at bedside Administered scheduled medications    1420- David from lab called- trop critical at 139- notified NP- no c/o chest pain- no new orders at this time    1750- Administered scheduled medication. Visitor at bedside. No other needs voiced at this time. CBWR.     1900- Shift report given to [facility] night shift nurse.

## 2023-10-29 NOTE — ED NOTES
Assumed care of this patient from Dr. Marion Litten, patient pending lab results and disposition. I reviewed chart and saw patient who reports oxygen makes her feel better. She reports has been short of breath with some chest tightness over the last day also. Reports some orthopnea. Patient is familiar to me from previous admissions, she has a long history of nonischemic cardiomyopathy with last echo with a EF 20 to 25%. She has refused any invasive cardiac measures, AICD or cath. She has a history of GI bleeds, has required transfusion in the past so is not a candidate for anticoagulation. She also has a history of COPD and is on prednisone, recently put on a Z-Nicolas. She is on 2 L nasal cannula for comfort with my exam.  Pulse ox is 98 to 99%. She has slight expiratory wheezes right chest.  Her labs show a mild leukocytosis which I suspect is most likely from prednisone. She also has elevated BNP, 21,000, this is the most she has had on review of her records. She has a D-dimer 1.6, had similar level 6/2023 and CTA was negative for PE.  EKG with questionable A-fib, rate is controlled, has no history of A-fib. Chest x-ray consistent with pulmonary congestion most likely from CHF. There is no obvious pneumonia. Plan to give patient some Solu-Medrol 60 mg IV followed with a DuoNeb for the wheezing. She was given 40 mg Lasix in the ED, plan to admit for further diuresis.      Jaydon Fitch MD  10/29/23 7601

## 2023-10-29 NOTE — PROGRESS NOTES
Patient was lying in the bed no distress noted with nasal cannula of 2lpm.  99% with heart rate 77 bpm on the 2lpm.  Breathsounds were clear bnilaterially. Patient was educated on proper technique of taking an inhaler with a spacer. She was able to return demonstrate the technique. Patient did do a lot of coughing with each deep breath. She did the best she could.

## 2023-10-29 NOTE — ED TRIAGE NOTES
Patient arrived to room via wheelchair. Patient reports last night she started burping and then started having chest pain. States she also feels short of breath. States she took a Xanax around 3 am this morning for anxiety. Patient requesting oxygen. Patient also on prednisone and Zithromax since 10/23/2023.

## 2023-10-29 NOTE — ASSESSMENT & PLAN NOTE
-ongoing shortness of breath and orthopnea  -Lasix 4 mg given in the ED, continue Lasix 40 mg BID via IV  -BNP: 31,130-last ECHO 6/2023:  Severely reduced left ventricular systolic function with a visually estimated EF of 20 - 25%. Left ventricle size is normal. Mildly increased wall thickness. Severe global hypokinesis present. Abnormal diastolic function.    -Monitor I&O and daily weights  -cardiac telemetry   -EKG: Atrial fibrillation  -fluid restriction: 1500 ml  -serial troponin 138, 134, will continue to trend  -Daily BMP (closely monitor K+ and Creatinine)  -cardiac consulted

## 2023-10-29 NOTE — ED NOTES
Patient continuing to report she feels short of breath. 02 sats at 96% on room air. NC 2 L/min applied for comfort.      Oc Houser RN  10/29/23 6568 2.19

## 2023-10-29 NOTE — H&P
homophones, and other interpretive errors are inadvertently transcribed by the computer software. Please disregard these errors. Please excuse any errors that have escaped final proofreading. Thank you.

## 2023-10-29 NOTE — ED NOTES
TRANSFER - OUT REPORT:    Verbal report given to Nila Martinez LPN on Elis Altman  being transferred to [facility] for routine progression of patient care       Report consisted of patient's Situation, Background, Assessment and   Recommendations(SBAR). Information from the following report(s) Nurse Handoff Report, ED Encounter Summary, ED SBAR, [facility], and Recent Results was reviewed with the receiving nurse. Chester Fall Assessment:    Presents to emergency department  because of falls (Syncope, seizure, or loss of consciousness): No  Age > 70: Yes  Altered Mental Status, Intoxication with alcohol or substance confusion (Disorientation, impaired judgment, poor safety awaremess, or inability to follow instructions): No  Impaired Mobility: Ambulates or transfers with assistive devices or assistance; Unable to ambulate or transer.: Yes  Nursing Judgement: Yes          Lines:   Peripheral IV 10/29/23 Distal;Right Forearm (Active)   Site Assessment Clean, dry & intact 10/29/23 0620   Line Status Blood return noted 10/29/23 0620   Line Care Connections checked and tightened 10/29/23 0620   Phlebitis Assessment No symptoms 10/29/23 0620   Infiltration Assessment 0 10/29/23 0620   Dressing Status Clean, dry & intact 10/29/23 0620   Dressing Type Transparent 10/29/23 4548        Opportunity for questions and clarification was provided.       Patient transported with:  Monitor, O2 @ 2lpm, and Registered Nurse           Leila Bronson RN  10/29/23 0496

## 2023-10-30 VITALS
OXYGEN SATURATION: 98 % | DIASTOLIC BLOOD PRESSURE: 73 MMHG | BODY MASS INDEX: 26.21 KG/M2 | HEIGHT: 62 IN | HEART RATE: 69 BPM | TEMPERATURE: 98 F | SYSTOLIC BLOOD PRESSURE: 134 MMHG | WEIGHT: 142.42 LBS | RESPIRATION RATE: 18 BRPM

## 2023-10-30 LAB
ANION GAP SERPL CALC-SCNC: 6 MMOL/L (ref 3–18)
BUN SERPL-MCNC: 22 MG/DL (ref 7–18)
BUN/CREAT SERPL: 19 (ref 12–20)
CA-I BLD-MCNC: 8.4 MG/DL (ref 8.5–10.1)
CHLORIDE SERPL-SCNC: 100 MMOL/L (ref 100–111)
CO2 SERPL-SCNC: 31 MMOL/L (ref 21–32)
CREAT SERPL-MCNC: 1.13 MG/DL (ref 0.6–1.3)
ERYTHROCYTE [DISTWIDTH] IN BLOOD BY AUTOMATED COUNT: 16 % (ref 11.6–14.5)
GLUCOSE SERPL-MCNC: 158 MG/DL (ref 74–99)
HCT VFR BLD AUTO: 32 % (ref 35–45)
HGB BLD-MCNC: 9.9 G/DL (ref 12–16)
MAGNESIUM SERPL-MCNC: 2.2 MG/DL (ref 1.6–2.6)
MCH RBC QN AUTO: 26.4 PG (ref 24–34)
MCHC RBC AUTO-ENTMCNC: 30.9 G/DL (ref 31–37)
MCV RBC AUTO: 85.3 FL (ref 78–100)
NRBC # BLD: 0 K/UL (ref 0–0.01)
NRBC BLD-RTO: 0 PER 100 WBC
PLATELET # BLD AUTO: 449 K/UL (ref 135–420)
PMV BLD AUTO: 9.2 FL (ref 9.2–11.8)
POTASSIUM SERPL-SCNC: 3.6 MMOL/L (ref 3.5–5.5)
RBC # BLD AUTO: 3.75 M/UL (ref 4.2–5.3)
SODIUM SERPL-SCNC: 137 MMOL/L (ref 136–145)
WBC # BLD AUTO: 7.7 K/UL (ref 4.6–13.2)

## 2023-10-30 PROCEDURE — 2700000000 HC OXYGEN THERAPY PER DAY

## 2023-10-30 PROCEDURE — 94640 AIRWAY INHALATION TREATMENT: CPT

## 2023-10-30 PROCEDURE — 6370000000 HC RX 637 (ALT 250 FOR IP): Performed by: NURSE PRACTITIONER

## 2023-10-30 PROCEDURE — 85027 COMPLETE CBC AUTOMATED: CPT

## 2023-10-30 PROCEDURE — G0378 HOSPITAL OBSERVATION PER HR: HCPCS

## 2023-10-30 PROCEDURE — 2580000003 HC RX 258: Performed by: NURSE PRACTITIONER

## 2023-10-30 PROCEDURE — 6360000002 HC RX W HCPCS: Performed by: NURSE PRACTITIONER

## 2023-10-30 PROCEDURE — 83735 ASSAY OF MAGNESIUM: CPT

## 2023-10-30 PROCEDURE — 80048 BASIC METABOLIC PNL TOTAL CA: CPT

## 2023-10-30 PROCEDURE — 96376 TX/PRO/DX INJ SAME DRUG ADON: CPT

## 2023-10-30 PROCEDURE — 36415 COLL VENOUS BLD VENIPUNCTURE: CPT

## 2023-10-30 PROCEDURE — 94761 N-INVAS EAR/PLS OXIMETRY MLT: CPT

## 2023-10-30 RX ORDER — FUROSEMIDE 20 MG/1
20 TABLET ORAL DAILY
Qty: 30 TABLET | Refills: 3 | Status: SHIPPED | OUTPATIENT
Start: 2023-10-30

## 2023-10-30 RX ADMIN — FLUTICASONE PROPIONATE 2 SPRAY: 50 SPRAY, METERED NASAL at 09:18

## 2023-10-30 RX ADMIN — FUROSEMIDE 20 MG: 10 INJECTION, SOLUTION INTRAMUSCULAR; INTRAVENOUS at 09:19

## 2023-10-30 RX ADMIN — PANTOPRAZOLE SODIUM 40 MG: 40 TABLET, DELAYED RELEASE ORAL at 05:33

## 2023-10-30 RX ADMIN — LOSARTAN POTASSIUM 25 MG: 25 TABLET, FILM COATED ORAL at 09:19

## 2023-10-30 RX ADMIN — WATER 40 MG: 1 INJECTION INTRAMUSCULAR; INTRAVENOUS; SUBCUTANEOUS at 09:19

## 2023-10-30 RX ADMIN — POTASSIUM CHLORIDE 20 MEQ: 750 TABLET, EXTENDED RELEASE ORAL at 09:19

## 2023-10-30 RX ADMIN — WATER 40 MG: 1 INJECTION INTRAMUSCULAR; INTRAVENOUS; SUBCUTANEOUS at 00:24

## 2023-10-30 RX ADMIN — SODIUM CHLORIDE, PRESERVATIVE FREE 10 ML: 5 INJECTION INTRAVENOUS at 09:37

## 2023-10-30 RX ADMIN — CARVEDILOL 25 MG: 12.5 TABLET, FILM COATED ORAL at 09:19

## 2023-10-30 RX ADMIN — MOMETASONE FUROATE AND FORMOTEROL FUMARATE DIHYDRATE 2 PUFF: 200; 5 AEROSOL RESPIRATORY (INHALATION) at 07:25

## 2023-10-30 ASSESSMENT — PAIN SCALES - GENERAL
PAINLEVEL_OUTOF10: 0
PAINLEVEL_OUTOF10: 0

## 2023-10-30 NOTE — PLAN OF CARE
Problem: Safety - Adult  Goal: Free from fall injury  10/30/2023 1210 by Janet Soto RN  Outcome: Adequate for Discharge  10/30/2023 0753 by Janet Soto RN  Outcome: Progressing

## 2023-10-30 NOTE — CARE COORDINATION
Case Management Assessment  Initial Evaluation    Date/Time of Evaluation: 10/30/2023 9:53 AM  Assessment Completed by: Mychal Renteria RN    If patient is discharged prior to next notation, then this note serves as note for discharge by case management. Patient Name: Jordana Richardson                   YOB: 1940  Diagnosis: COPD exacerbation ([address]) [J44.1]  NICM (nonischemic cardiomyopathy) ([address]) [I42.8]  Acute on chronic combined systolic and diastolic CHF (congestive heart failure) ([address]) [I50.43]  Acute on chronic combined systolic (congestive) and diastolic (congestive) heart failure ([address]) [I50.43]                   Date / Time: 10/29/2023  6:10 AM    Patient Admission Status: Inpatient   Readmission Risk (Low < 19, Mod (19-27), High > 27): Readmission Risk Score: 16.4    Current PCP: Quintin Newman MD  PCP verified by CM? Yes    Chart Reviewed: Yes      History Provided by: Patient  Patient Orientation: Alert and Oriented    Patient Cognition: Alert    Hospitalization in the last 30 days (Readmission):  No    If yes, Readmission Assessment in CM Navigator will be completed. Advance Directives:      Code Status: Full Code   Patient's Primary Decision Maker is: Legal Next of Kin      Discharge Planning:    Patient lives with: (P) Alone Type of Home: (P) Apartment  Primary Care Giver: Family  Patient Support Systems include: Children, Family Members   Current Financial resources: Medicare, Medicaid  Current community resources: None  Current services prior to admission: (P) Home Care            Current DME:              Type of Home Care services:  (P) Aide Services    ADLS  Prior functional level: Assistance with the following:, Bathing, Dressing, Toileting  Current functional level: Assistance with the following:, Bathing, Dressing, Toileting    PT AM-PAC:   /24  OT AM-PAC:   /24    Family can provide assistance at DC:  Yes  Would you like Case Management to discuss the discharge plan with any

## 2023-10-30 NOTE — DISCHARGE SUMMARY
azithromycin 250 MG tablet  Commonly known as: Luis Angel Olea               Where to Get Your Medications        These medications were sent to [facility], [address] [facility] [phone] Geremias Neal [phone]  [address] [place] [address] , [address]       Phone: [phone]   furosemide 20 MG tablet         HPI on Admission (per admitting physician):   Ellis Hernandez is a 78 y.o. elderly  female with a past medical history for CAD, Hypertension, Heart failure, COPD, GI Bleed, atrial septal defect, patient presented to the ED with multiple complaints including indigestion, shortness of breath, and orthopnea. Patient reports that symptoms started last night, patient does endorse a recent sinus infection where she was started on Prednisone and Azithromycin which she has completed. Other accompanying symptoms included neck stiffness, decreased appetite, abdominal tenderness, lightheadedness, and intermittent ankle swelling, patient denies chest pain, palpitations, shortness of breath on  exertion, nausea, vomiting, and diarrhea. While in the ED BNP 31,130, initial troponin 138, repeat 134, HH 10/32, wbc 13.5, D. Dimer 1.09, occult stool negative, CXR: cardiomegaly and mild interstitial pulmonary edema, and EKG shown atrial fibrillation (new onset). In the eD patient was given Duonebs, IV solumedrol, and IV Lasix. Discussed case with ED provider, hospital medicine will admit the patient for further evaluation and treatment. Patient assessed at the bedside, patient is alert and oriented, there is no acute distress noted, patient stable on 2 LPM via NC. Patient agrees to admission for a diagnosis of exacerbation of heart failure and new onset of atrial fibrillation, treatment to include diuresing, supplemental oxygen, duonebs, cardiologist consult.      Hospital Course and Discharge Diagnosis   The patient admitted for the following Principal Medical Problem:   Acute on chronic

## 2023-10-30 NOTE — PROGRESS NOTES
0700 - Assumed care of patient. Report received from [facility].     0715 - Patient laying in bed with eyes closed resting quietly. Easily awoken when called by name. Patient denied shortness of breath. No distress noted. Denied needs, questions or concerns. 0198- Administered scheduled medications. Cardiology, QUITA Briseon, at bedside. PCT assisted with bathing patient. 1145 - Patient sitting on side of bed eating lunch. 1250 - PIV removed. Discharge appointments made. Education sheets provided with discharge paperwork. All reviewed and signed. Patient denied questions. 1327 - Patient leaving unit via wheelchair accompanied to front exit by ESTELA Laws, RN nursing supervisor.

## 2023-10-30 NOTE — PROGRESS NOTES
visited with patient this morning. Patient alert and oriented. Patient stated that she was going home today and was excited. Discussed with patient her relationship with God and her spiritual beliefs. Patient stated that she has a very close relationship with God and prays to him on a daily basis. Patient engaged in conversation and was encouraged to be going home soon. Closed visit with prayer and words of encouragement.     [address], [address]

## 2023-10-30 NOTE — CONSULTS
CARDIOLOGY CONSULTATION    REASON FOR CONSULT: New onset atrial fibrillation    REQUESTING PROVIDER: STEVE White    CHIEF COMPLAINT:  Shortness of breath    HISTORY OF PRESENT ILLNESS:  Sarah Leonard is a 78y.o. year-old female with past medical history significant for CAD, HTN, HFrEF, COPD, GI bleed, anemia, and atrial septal defect,  who was evaluated today due to new onset of atrial fibrillation. Patient reports she was experiencing worsening shortness of breath at home. She reports feeling much better today and states her breathing has returned to baseline. She reports recent URI with sinus infection treated by primary with azithromycin and PO steroids. She denies any CP, palpitations or dizziness. Reports abdominal pain and nausea resolved. She reports complaint with home medication. She is followed by [facility] with recent appointment in August.      Records from hospital admission course thus far reviewed. Telemetry reviewed. No acute events overnight. INPATIENT MEDICATIONS:  Home medications reviewed.     Current Facility-Administered Medications:     sodium chloride flush 0.9 % injection 5-40 mL, 5-40 mL, IntraVENous, 2 times per day, Gabrielle Ricci APRN - CNP, 10 mL at 10/30/23 0937    sodium chloride flush 0.9 % injection 5-40 mL, 5-40 mL, IntraVENous, PRN, Gabrielle Ricci APRN - CNP    [Held by provider] enoxaparin (LOVENOX) injection 40 mg, 40 mg, SubCUTAneous, Daily, Gabrielle Ricci APRN - CNP, 40 mg at 10/29/23 1144    ondansetron (ZOFRAN-ODT) disintegrating tablet 4 mg, 4 mg, Oral, Q8H PRN **OR** ondansetron (ZOFRAN) injection 4 mg, 4 mg, IntraVENous, Q6H PRN, Gabrielle Ricci APRN - CNP    polyethylene glycol (GLYCOLAX) packet 17 g, 17 g, Oral, Daily PRN, Gabrielle Ricci APRN - CNP    acetaminophen (TYLENOL) tablet 650 mg, 650 mg, Oral, Q6H PRN **OR** acetaminophen (TYLENOL) suppository 650 mg, 650 mg, Rectal, Q6H PRN, Gabrielle Ricci

## 2023-10-30 NOTE — PROGRESS NOTES
Shift report for 7pm to 7am    1850- Change in shift report received from Mat Lin. Patient was resting in bed with eyes open, responded to verbal stimuli appropriately. Patient had expiratory wheezing bilaterally. Patient denied pain at this time. Patient repositions self in bed as needed. Ahmad Stai Is in place draining clear yellow urine. Patient was verified to be clean and dry. Call bell is within reach. 2028- Patient was given evening medications with sips of water. Patient requested ginger ale and sloan crackers which RN gave to her. Patient denied pain at this time. 2210- Rounding completed, patient was sleeping and showed no signs of pain or distress. 2354- Patient was reassessed for changes, none noted. 0130- Patient was resting in bed with eyes closed, responded to verbal stimuli appropriately. Patient was resting on right side, continues to reposition self as needed. Patient reported decreased dyspnea with mild cough. 1653- Patient was reassessed for changes, none noted. Patient requested saltines and sprite, given by RN.     0672- Patient was given morning medication with sips of water, patient tolerated well. 3427- Change in shift report given to [address] [place] [address]

## 2023-11-04 LAB
BACTERIA SPEC CULT: NORMAL
BACTERIA SPEC CULT: NORMAL
Lab: NORMAL
Lab: NORMAL

## 2023-12-05 ENCOUNTER — APPOINTMENT (OUTPATIENT)
Age: 83
End: 2023-12-05
Payer: MEDICARE

## 2023-12-05 ENCOUNTER — HOSPITAL ENCOUNTER (OUTPATIENT)
Age: 83
Setting detail: OBSERVATION
Discharge: HOME | End: 2023-12-07
Attending: EMERGENCY MEDICINE | Admitting: INTERNAL MEDICINE
Payer: MEDICARE

## 2023-12-05 DIAGNOSIS — R50.9 FEVER, UNSPECIFIED FEVER CAUSE: Primary | ICD-10-CM

## 2023-12-05 DIAGNOSIS — D72.829 LEUKOCYTOSIS, UNSPECIFIED TYPE: ICD-10-CM

## 2023-12-05 DIAGNOSIS — I31.39 PERICARDIAL EFFUSION: ICD-10-CM

## 2023-12-05 LAB
ANION GAP SERPL CALC-SCNC: 7 MMOL/L (ref 3–18)
APPEARANCE UR: CLEAR
BASOPHILS # BLD: 0 K/UL (ref 0–0.1)
BASOPHILS NFR BLD: 0 % (ref 0–2)
BILIRUB UR QL: NEGATIVE
BUN SERPL-MCNC: 11 MG/DL (ref 7–18)
BUN/CREAT SERPL: 9 (ref 12–20)
CA-I BLD-MCNC: 8.9 MG/DL (ref 8.5–10.1)
CHLORIDE SERPL-SCNC: 103 MMOL/L (ref 100–111)
CO2 SERPL-SCNC: 27 MMOL/L (ref 21–32)
COLOR UR: YELLOW
CREAT SERPL-MCNC: 1.29 MG/DL (ref 0.6–1.3)
DIFFERENTIAL METHOD BLD: ABNORMAL
EOSINOPHIL # BLD: 0.1 K/UL (ref 0–0.4)
EOSINOPHIL NFR BLD: 1 % (ref 0–5)
ERYTHROCYTE [DISTWIDTH] IN BLOOD BY AUTOMATED COUNT: 16.8 % (ref 11.6–14.5)
FLUAV AG NPH QL IA: NEGATIVE
FLUBV AG NOSE QL IA: NEGATIVE
GLUCOSE SERPL-MCNC: 104 MG/DL (ref 74–99)
GLUCOSE UR STRIP.AUTO-MCNC: NEGATIVE MG/DL
HCT VFR BLD AUTO: 36.3 % (ref 35–45)
HGB BLD-MCNC: 10.9 G/DL (ref 12–16)
HGB UR QL STRIP: NEGATIVE
IMM GRANULOCYTES # BLD AUTO: 0 K/UL
IMM GRANULOCYTES NFR BLD AUTO: 0 %
KETONES UR QL STRIP.AUTO: NEGATIVE MG/DL
LACTATE SERPL-SCNC: 1.1 MMOL/L (ref 0.4–2)
LEUKOCYTE ESTERASE UR QL STRIP.AUTO: NEGATIVE
LYMPHOCYTES # BLD: 3.2 K/UL (ref 0.9–3.6)
LYMPHOCYTES NFR BLD: 22 % (ref 21–52)
MCH RBC QN AUTO: 25.2 PG (ref 24–34)
MCHC RBC AUTO-ENTMCNC: 30 G/DL (ref 31–37)
MCV RBC AUTO: 83.8 FL (ref 78–100)
MONOCYTES # BLD: 1 K/UL (ref 0.05–1.2)
MONOCYTES NFR BLD: 7 % (ref 3–10)
NEUTS SEG # BLD: 10.2 K/UL (ref 1.8–8)
NEUTS SEG NFR BLD: 70 % (ref 40–73)
NITRITE UR QL STRIP.AUTO: NEGATIVE
NRBC # BLD: 0 K/UL (ref 0–0.01)
NRBC BLD-RTO: 0 PER 100 WBC
PH UR STRIP: 6 (ref 5–8)
PLATELET # BLD AUTO: 550 K/UL (ref 135–420)
PMV BLD AUTO: 8.7 FL (ref 9.2–11.8)
POTASSIUM SERPL-SCNC: 4.2 MMOL/L (ref 3.5–5.5)
PROCALCITONIN SERPL-MCNC: 0.14 NG/ML
PROT UR STRIP-MCNC: NEGATIVE MG/DL
RBC # BLD AUTO: 4.33 M/UL (ref 4.2–5.3)
RBC MORPH BLD: ABNORMAL
SARS-COV-2 RDRP RESP QL NAA+PROBE: NOT DETECTED
SODIUM SERPL-SCNC: 137 MMOL/L (ref 136–145)
SP GR UR REFRACTOMETRY: 1.01 (ref 1–1.03)
TROPONIN I SERPL HS-MCNC: 90 NG/L (ref 0–54)
TROPONIN I SERPL HS-MCNC: 96 NG/L (ref 0–54)
UROBILINOGEN UR QL STRIP.AUTO: 0.2 EU/DL (ref 0.2–1)
WBC # BLD AUTO: 14.5 K/UL (ref 4.6–13.2)

## 2023-12-05 PROCEDURE — 2580000003 HC RX 258: Performed by: NURSE PRACTITIONER

## 2023-12-05 PROCEDURE — 80048 BASIC METABOLIC PNL TOTAL CA: CPT

## 2023-12-05 PROCEDURE — 6370000000 HC RX 637 (ALT 250 FOR IP): Performed by: NURSE PRACTITIONER

## 2023-12-05 PROCEDURE — 87804 INFLUENZA ASSAY W/OPTIC: CPT

## 2023-12-05 PROCEDURE — 87086 URINE CULTURE/COLONY COUNT: CPT

## 2023-12-05 PROCEDURE — 96366 THER/PROPH/DIAG IV INF ADDON: CPT

## 2023-12-05 PROCEDURE — 83605 ASSAY OF LACTIC ACID: CPT

## 2023-12-05 PROCEDURE — 84145 PROCALCITONIN (PCT): CPT

## 2023-12-05 PROCEDURE — 71275 CT ANGIOGRAPHY CHEST: CPT

## 2023-12-05 PROCEDURE — 73502 X-RAY EXAM HIP UNI 2-3 VIEWS: CPT

## 2023-12-05 PROCEDURE — 2500000003 HC RX 250 WO HCPCS: Performed by: EMERGENCY MEDICINE

## 2023-12-05 PROCEDURE — 87040 BLOOD CULTURE FOR BACTERIA: CPT

## 2023-12-05 PROCEDURE — G0378 HOSPITAL OBSERVATION PER HR: HCPCS

## 2023-12-05 PROCEDURE — 6360000004 HC RX CONTRAST MEDICATION: Performed by: EMERGENCY MEDICINE

## 2023-12-05 PROCEDURE — 2580000003 HC RX 258: Performed by: EMERGENCY MEDICINE

## 2023-12-05 PROCEDURE — 71045 X-RAY EXAM CHEST 1 VIEW: CPT

## 2023-12-05 PROCEDURE — 99285 EMERGENCY DEPT VISIT HI MDM: CPT

## 2023-12-05 PROCEDURE — 87635 SARS-COV-2 COVID-19 AMP PRB: CPT

## 2023-12-05 PROCEDURE — 84484 ASSAY OF TROPONIN QUANT: CPT

## 2023-12-05 PROCEDURE — 93005 ELECTROCARDIOGRAM TRACING: CPT | Performed by: EMERGENCY MEDICINE

## 2023-12-05 PROCEDURE — 85025 COMPLETE CBC W/AUTO DIFF WBC: CPT

## 2023-12-05 PROCEDURE — 94640 AIRWAY INHALATION TREATMENT: CPT

## 2023-12-05 PROCEDURE — 96365 THER/PROPH/DIAG IV INF INIT: CPT

## 2023-12-05 PROCEDURE — 81003 URINALYSIS AUTO W/O SCOPE: CPT

## 2023-12-05 PROCEDURE — 6370000000 HC RX 637 (ALT 250 FOR IP): Performed by: EMERGENCY MEDICINE

## 2023-12-05 PROCEDURE — 6360000002 HC RX W HCPCS: Performed by: NURSE PRACTITIONER

## 2023-12-05 PROCEDURE — 74177 CT ABD & PELVIS W/CONTRAST: CPT

## 2023-12-05 PROCEDURE — 94760 N-INVAS EAR/PLS OXIMETRY 1: CPT

## 2023-12-05 RX ORDER — ACETAMINOPHEN 325 MG/1
650 TABLET ORAL EVERY 6 HOURS PRN
Status: DISCONTINUED | OUTPATIENT
Start: 2023-12-05 | End: 2023-12-07 | Stop reason: HOSPADM

## 2023-12-05 RX ORDER — ONDANSETRON 4 MG/1
4 TABLET, ORALLY DISINTEGRATING ORAL EVERY 8 HOURS PRN
Status: DISCONTINUED | OUTPATIENT
Start: 2023-12-05 | End: 2023-12-07 | Stop reason: HOSPADM

## 2023-12-05 RX ORDER — ACETAMINOPHEN 650 MG/1
650 SUPPOSITORY RECTAL EVERY 6 HOURS PRN
Status: DISCONTINUED | OUTPATIENT
Start: 2023-12-05 | End: 2023-12-07 | Stop reason: HOSPADM

## 2023-12-05 RX ORDER — PANTOPRAZOLE SODIUM 40 MG/1
40 TABLET, DELAYED RELEASE ORAL
Status: DISCONTINUED | OUTPATIENT
Start: 2023-12-06 | End: 2023-12-07 | Stop reason: HOSPADM

## 2023-12-05 RX ORDER — ALBUTEROL SULFATE 2.5 MG/3ML
2.5 SOLUTION RESPIRATORY (INHALATION) EVERY 6 HOURS PRN
Status: DISCONTINUED | OUTPATIENT
Start: 2023-12-05 | End: 2023-12-07 | Stop reason: HOSPADM

## 2023-12-05 RX ORDER — SODIUM CHLORIDE, SODIUM LACTATE, POTASSIUM CHLORIDE, AND CALCIUM CHLORIDE .6; .31; .03; .02 G/100ML; G/100ML; G/100ML; G/100ML
30 INJECTION, SOLUTION INTRAVENOUS ONCE
Status: DISCONTINUED | OUTPATIENT
Start: 2023-12-05 | End: 2023-12-05

## 2023-12-05 RX ORDER — GABAPENTIN 100 MG/1
100 CAPSULE ORAL
Status: DISCONTINUED | OUTPATIENT
Start: 2023-12-05 | End: 2023-12-07 | Stop reason: HOSPADM

## 2023-12-05 RX ORDER — POLYETHYLENE GLYCOL 3350 17 G/17G
17 POWDER, FOR SOLUTION ORAL DAILY PRN
Status: DISCONTINUED | OUTPATIENT
Start: 2023-12-05 | End: 2023-12-07 | Stop reason: HOSPADM

## 2023-12-05 RX ORDER — ENOXAPARIN SODIUM 100 MG/ML
40 INJECTION SUBCUTANEOUS DAILY
Status: DISCONTINUED | OUTPATIENT
Start: 2023-12-06 | End: 2023-12-07 | Stop reason: HOSPADM

## 2023-12-05 RX ORDER — LOSARTAN POTASSIUM 25 MG/1
25 TABLET ORAL DAILY
Status: DISCONTINUED | OUTPATIENT
Start: 2023-12-06 | End: 2023-12-07 | Stop reason: HOSPADM

## 2023-12-05 RX ORDER — SACUBITRIL AND VALSARTAN 24; 26 MG/1; MG/1
1 TABLET, FILM COATED ORAL 2 TIMES DAILY
COMMUNITY

## 2023-12-05 RX ORDER — SODIUM CHLORIDE, SODIUM LACTATE, POTASSIUM CHLORIDE, AND CALCIUM CHLORIDE .6; .31; .03; .02 G/100ML; G/100ML; G/100ML; G/100ML
750 INJECTION, SOLUTION INTRAVENOUS ONCE
Status: COMPLETED | OUTPATIENT
Start: 2023-12-05 | End: 2023-12-05

## 2023-12-05 RX ORDER — SODIUM CHLORIDE 0.9 % (FLUSH) 0.9 %
5-40 SYRINGE (ML) INJECTION PRN
Status: DISCONTINUED | OUTPATIENT
Start: 2023-12-05 | End: 2023-12-07 | Stop reason: HOSPADM

## 2023-12-05 RX ORDER — HYDROCODONE BITARTRATE AND ACETAMINOPHEN 5; 325 MG/1; MG/1
1 TABLET ORAL
Status: COMPLETED | OUTPATIENT
Start: 2023-12-05 | End: 2023-12-05

## 2023-12-05 RX ORDER — FERROUS SULFATE 325(65) MG
325 TABLET ORAL EVERY OTHER DAY
Status: DISCONTINUED | OUTPATIENT
Start: 2023-12-06 | End: 2023-12-07 | Stop reason: HOSPADM

## 2023-12-05 RX ORDER — ATORVASTATIN CALCIUM 10 MG/1
40 TABLET, FILM COATED ORAL NIGHTLY
Status: DISCONTINUED | OUTPATIENT
Start: 2023-12-05 | End: 2023-12-07 | Stop reason: HOSPADM

## 2023-12-05 RX ORDER — ALPRAZOLAM 0.25 MG/1
0.25 TABLET ORAL EVERY 12 HOURS PRN
Status: DISCONTINUED | OUTPATIENT
Start: 2023-12-05 | End: 2023-12-07 | Stop reason: HOSPADM

## 2023-12-05 RX ORDER — POTASSIUM CHLORIDE 750 MG/1
20 TABLET, EXTENDED RELEASE ORAL DAILY
Status: DISCONTINUED | OUTPATIENT
Start: 2023-12-06 | End: 2023-12-07 | Stop reason: HOSPADM

## 2023-12-05 RX ORDER — FUROSEMIDE 20 MG/1
20 TABLET ORAL DAILY
Status: DISCONTINUED | OUTPATIENT
Start: 2023-12-06 | End: 2023-12-06

## 2023-12-05 RX ORDER — ASPIRIN 81 MG/1
162 TABLET, CHEWABLE ORAL ONCE
Status: COMPLETED | OUTPATIENT
Start: 2023-12-05 | End: 2023-12-05

## 2023-12-05 RX ORDER — SODIUM CHLORIDE 9 MG/ML
INJECTION, SOLUTION INTRAVENOUS PRN
Status: DISCONTINUED | OUTPATIENT
Start: 2023-12-05 | End: 2023-12-07 | Stop reason: HOSPADM

## 2023-12-05 RX ORDER — MONTELUKAST SODIUM 10 MG/1
10 TABLET ORAL NIGHTLY
Status: DISCONTINUED | OUTPATIENT
Start: 2023-12-05 | End: 2023-12-07 | Stop reason: HOSPADM

## 2023-12-05 RX ORDER — SODIUM CHLORIDE 0.9 % (FLUSH) 0.9 %
5-40 SYRINGE (ML) INJECTION EVERY 12 HOURS SCHEDULED
Status: DISCONTINUED | OUTPATIENT
Start: 2023-12-05 | End: 2023-12-07 | Stop reason: HOSPADM

## 2023-12-05 RX ORDER — CARVEDILOL 12.5 MG/1
25 TABLET ORAL 2 TIMES DAILY WITH MEALS
Status: DISCONTINUED | OUTPATIENT
Start: 2023-12-06 | End: 2023-12-07 | Stop reason: HOSPADM

## 2023-12-05 RX ORDER — SPIRONOLACTONE 25 MG/1
12.5 TABLET ORAL DAILY
Status: DISCONTINUED | OUTPATIENT
Start: 2023-12-06 | End: 2023-12-07 | Stop reason: HOSPADM

## 2023-12-05 RX ORDER — ONDANSETRON 2 MG/ML
4 INJECTION INTRAMUSCULAR; INTRAVENOUS EVERY 6 HOURS PRN
Status: DISCONTINUED | OUTPATIENT
Start: 2023-12-05 | End: 2023-12-07 | Stop reason: HOSPADM

## 2023-12-05 RX ORDER — FLUTICASONE PROPIONATE 50 MCG
2 SPRAY, SUSPENSION (ML) NASAL DAILY
Status: DISCONTINUED | OUTPATIENT
Start: 2023-12-06 | End: 2023-12-07 | Stop reason: HOSPADM

## 2023-12-05 RX ADMIN — IOPAMIDOL 95 ML: 755 INJECTION, SOLUTION INTRAVENOUS at 18:44

## 2023-12-05 RX ADMIN — HYDROCODONE BITARTRATE AND ACETAMINOPHEN 1 TABLET: 5; 325 TABLET ORAL at 17:38

## 2023-12-05 RX ADMIN — Medication 2 PUFF: at 22:53

## 2023-12-05 RX ADMIN — ALBUTEROL SULFATE 2.5 MG: 2.5 SOLUTION RESPIRATORY (INHALATION) at 22:53

## 2023-12-05 RX ADMIN — ASPIRIN 162 MG: 81 TABLET, CHEWABLE ORAL at 22:11

## 2023-12-05 RX ADMIN — ATORVASTATIN CALCIUM 40 MG: 10 TABLET, FILM COATED ORAL at 22:10

## 2023-12-05 RX ADMIN — SODIUM CHLORIDE, PRESERVATIVE FREE 10 ML: 5 INJECTION INTRAVENOUS at 22:11

## 2023-12-05 RX ADMIN — MONTELUKAST 10 MG: 10 TABLET, FILM COATED ORAL at 22:11

## 2023-12-05 RX ADMIN — SACUBITRIL AND VALSARTAN 1 TABLET: 24; 26 TABLET, FILM COATED ORAL at 23:48

## 2023-12-05 RX ADMIN — DOXYCYCLINE 100 MG: 100 INJECTION, POWDER, LYOPHILIZED, FOR SOLUTION INTRAVENOUS at 18:38

## 2023-12-05 RX ADMIN — GABAPENTIN 100 MG: 100 CAPSULE ORAL at 22:11

## 2023-12-05 RX ADMIN — SODIUM CHLORIDE, POTASSIUM CHLORIDE, SODIUM LACTATE AND CALCIUM CHLORIDE 750 ML: 600; 310; 30; 20 INJECTION, SOLUTION INTRAVENOUS at 19:02

## 2023-12-05 ASSESSMENT — PAIN DESCRIPTION - ORIENTATION: ORIENTATION: RIGHT

## 2023-12-05 ASSESSMENT — PAIN SCALES - GENERAL
PAINLEVEL_OUTOF10: 0
PAINLEVEL_OUTOF10: 8

## 2023-12-05 ASSESSMENT — PAIN - FUNCTIONAL ASSESSMENT: PAIN_FUNCTIONAL_ASSESSMENT: 0-10

## 2023-12-05 ASSESSMENT — PAIN DESCRIPTION - PAIN TYPE: TYPE: ACUTE PAIN

## 2023-12-05 ASSESSMENT — PAIN DESCRIPTION - LOCATION: LOCATION: HIP

## 2023-12-05 NOTE — ED TRIAGE NOTES
Pt reports right hip pain that goes down the leg, states it started three days ago. Reports temp of 100.4 at night.

## 2023-12-05 NOTE — ED NOTES
MD made aware of patient's hx of CHF. Awaiting dose adjustment for fluid bolus related to chf hx.       Crys Curry RN  12/05/23 4756

## 2023-12-05 NOTE — ED PROVIDER NOTES
Congestion present. No rhinorrhea. Mouth/Throat:      Mouth: Mucous membranes are moist.      Pharynx: Oropharynx is clear. No oropharyngeal exudate or posterior oropharyngeal erythema. Eyes:      General: No scleral icterus. Right eye: No discharge. Left eye: No discharge. Extraocular Movements: Extraocular movements intact. Conjunctiva/sclera: Conjunctivae normal.      Pupils: Pupils are equal, round, and reactive to light. Neck:      Vascular: No carotid bruit. Cardiovascular:      Rate and Rhythm: Normal rate and regular rhythm. Pulses: Normal pulses. Heart sounds: Normal heart sounds. No murmur heard. No friction rub. No gallop. Pulmonary:      Effort: Pulmonary effort is normal. No respiratory distress. Breath sounds: Normal breath sounds. No stridor. No wheezing, rhonchi or rales. Chest:      Chest wall: No tenderness. Abdominal:      General: Abdomen is flat. Bowel sounds are normal. There is no distension. Palpations: Abdomen is soft. There is no mass. Tenderness: There is no abdominal tenderness. There is no right CVA tenderness, left CVA tenderness, guarding or rebound. Hernia: No hernia is present. Musculoskeletal:         General: Tenderness present. No swelling, deformity or signs of injury. Normal range of motion. Cervical back: Normal range of motion and neck supple. No rigidity or tenderness. Right lower leg: No edema. Left lower leg: No edema. Comments: No pain with passive range of motion of the bilateral hips, ankles, knees and feet. There is mild tenderness to palpation of the right greater trochanter. Straight leg raise negative bilateral.  No midline back pain. Lymphadenopathy:      Cervical: No cervical adenopathy. Skin:     General: Skin is warm and dry. Capillary Refill: Capillary refill takes less than 2 seconds. Coloration: Skin is not jaundiced or pale.       Findings: No

## 2023-12-05 NOTE — ED NOTES
Bedside and Verbal shift change report given to [address] [facility] (oncoming nurse) by Mckenna Rust (offgoing nurse). Report included the following information ED Encounter Summary, ED SBAR, [facility], Recent Results, and Cardiac Rhythm SR .        Isabella Curry RN  12/05/23 9901

## 2023-12-06 ENCOUNTER — HOSPITAL ENCOUNTER (OUTPATIENT)
Age: 83
Setting detail: OBSERVATION
Discharge: HOME | End: 2023-12-08
Payer: MEDICARE

## 2023-12-06 ENCOUNTER — APPOINTMENT (OUTPATIENT)
Age: 83
End: 2023-12-06
Payer: MEDICARE

## 2023-12-06 VITALS
SYSTOLIC BLOOD PRESSURE: 109 MMHG | WEIGHT: 128 LBS | HEIGHT: 62 IN | BODY MASS INDEX: 23.55 KG/M2 | DIASTOLIC BLOOD PRESSURE: 59 MMHG

## 2023-12-06 LAB
ANION GAP SERPL CALC-SCNC: 6 MMOL/L (ref 3–18)
BASOPHILS # BLD: 0 K/UL (ref 0–0.1)
BASOPHILS NFR BLD: 0 % (ref 0–2)
BUN SERPL-MCNC: 11 MG/DL (ref 7–18)
BUN/CREAT SERPL: 10 (ref 12–20)
CA-I BLD-MCNC: 8.6 MG/DL (ref 8.5–10.1)
CHLORIDE SERPL-SCNC: 105 MMOL/L (ref 100–111)
CO2 SERPL-SCNC: 27 MMOL/L (ref 21–32)
CREAT SERPL-MCNC: 1.06 MG/DL (ref 0.6–1.3)
DIFFERENTIAL METHOD BLD: ABNORMAL
ECHO AO ASC DIAM: 2.9 CM
ECHO AO ASCENDING AORTA INDEX: 1.84 CM/M2
ECHO AO ROOT DIAM: 3.3 CM
ECHO AO ROOT INDEX: 2.09 CM/M2
ECHO AV AREA PEAK VELOCITY: 1.9 CM2
ECHO AV AREA VTI: 1.9 CM2
ECHO AV AREA/BSA PEAK VELOCITY: 1.2 CM2/M2
ECHO AV AREA/BSA VTI: 1.2 CM2/M2
ECHO AV MEAN GRADIENT: 5 MMHG
ECHO AV MEAN VELOCITY: 1 M/S
ECHO AV PEAK GRADIENT: 9 MMHG
ECHO AV PEAK VELOCITY: 1.5 M/S
ECHO AV VELOCITY RATIO: 0.6
ECHO AV VTI: 26.8 CM
ECHO BSA: 1.59 M2
ECHO EST RA PRESSURE: 3 MMHG
ECHO IVC PROX: 1.7 CM
ECHO LA AREA 2C: 40 CM2
ECHO LA AREA 4C: 33.3 CM2
ECHO LA DIAMETER INDEX: 3.1 CM/M2
ECHO LA DIAMETER: 4.9 CM
ECHO LA MAJOR AXIS: 7.6 CM
ECHO LA MINOR AXIS: 7.6 CM
ECHO LA TO AORTIC ROOT RATIO: 1.48
ECHO LA VOL BP: 145 ML (ref 22–52)
ECHO LA VOL MOD A2C: 174 ML (ref 22–52)
ECHO LA VOL MOD A4C: 122 ML (ref 22–52)
ECHO LA VOL/BSA BIPLANE: 92 ML/M2 (ref 16–34)
ECHO LA VOLUME INDEX MOD A2C: 110 ML/M2 (ref 16–34)
ECHO LA VOLUME INDEX MOD A4C: 77 ML/M2 (ref 16–34)
ECHO LV E' LATERAL VELOCITY: 5 CM/S
ECHO LV E' SEPTAL VELOCITY: 4 CM/S
ECHO LV EDV A2C: 114 ML
ECHO LV EDV A4C: 81 ML
ECHO LV EDV INDEX A4C: 51 ML/M2
ECHO LV EDV NDEX A2C: 72 ML/M2
ECHO LV EJECTION FRACTION A2C: 26 %
ECHO LV EJECTION FRACTION A4C: 26 %
ECHO LV EJECTION FRACTION BIPLANE: 26 % (ref 55–100)
ECHO LV ESV A2C: 84 ML
ECHO LV ESV A4C: 60 ML
ECHO LV ESV INDEX A2C: 53 ML/M2
ECHO LV ESV INDEX A4C: 38 ML/M2
ECHO LV FRACTIONAL SHORTENING: 12 % (ref 28–44)
ECHO LV GLOBAL LONGITUDINAL STRAIN (GLS): -9.4 %
ECHO LV INTERNAL DIMENSION DIASTOLE INDEX: 3.67 CM/M2
ECHO LV INTERNAL DIMENSION DIASTOLIC: 5.8 CM (ref 3.9–5.3)
ECHO LV INTERNAL DIMENSION SYSTOLIC INDEX: 3.23 CM/M2
ECHO LV INTERNAL DIMENSION SYSTOLIC: 5.1 CM
ECHO LV IVSD: 1.1 CM (ref 0.6–0.9)
ECHO LV MASS 2D: 280.4 G (ref 67–162)
ECHO LV MASS INDEX 2D: 177.5 G/M2 (ref 43–95)
ECHO LV POSTERIOR WALL DIASTOLIC: 1.2 CM (ref 0.6–0.9)
ECHO LV RELATIVE WALL THICKNESS RATIO: 0.41
ECHO LVOT AREA: 3.1 CM2
ECHO LVOT AV VTI INDEX: 0.62
ECHO LVOT DIAM: 2 CM
ECHO LVOT MEAN GRADIENT: 2 MMHG
ECHO LVOT PEAK GRADIENT: 3 MMHG
ECHO LVOT PEAK VELOCITY: 0.9 M/S
ECHO LVOT STROKE VOLUME INDEX: 33 ML/M2
ECHO LVOT SV: 52.1 ML
ECHO LVOT VTI: 16.6 CM
ECHO MV A VELOCITY: 0.59 M/S
ECHO MV AREA VTI: 2.5 CM2
ECHO MV E DECELERATION TIME (DT): 264 MS
ECHO MV E VELOCITY: 0.86 M/S
ECHO MV E/A RATIO: 1.46
ECHO MV E/E' LATERAL: 17.2
ECHO MV E/E' RATIO (AVERAGED): 19.35
ECHO MV LVOT VTI INDEX: 1.27
ECHO MV MAX VELOCITY: 1 M/S
ECHO MV MEAN GRADIENT: 2 MMHG
ECHO MV MEAN VELOCITY: 0.7 M/S
ECHO MV PEAK GRADIENT: 4 MMHG
ECHO MV VTI: 21.1 CM
ECHO PV MAX VELOCITY: 1.1 M/S
ECHO PV PEAK GRADIENT: 5 MMHG
ECHO RA AREA 4C: 19 CM2
ECHO RA END SYSTOLIC VOLUME APICAL 4 CHAMBER INDEX BSA: 35 ML/M2
ECHO RA VOLUME: 55 ML
ECHO RIGHT VENTRICULAR SYSTOLIC PRESSURE (RVSP): 33 MMHG
ECHO RV BASAL DIMENSION: 3.8 CM
ECHO RV GLOBAL SYSTOLIC STRAIN (GLS): -22.1 %
ECHO RV LONGITUDINAL DIMENSION: 6.5 CM
ECHO RV MID DIMENSION: 3.1 CM
ECHO RV TAPSE: 1.4 CM (ref 1.7–?)
ECHO TV REGURGITANT MAX VELOCITY: 2.73 M/S
ECHO TV REGURGITANT PEAK GRADIENT: 30 MMHG
EKG ATRIAL RATE: 75 BPM
EKG DIAGNOSIS: NORMAL
EKG P AXIS: 75 DEGREES
EKG P-R INTERVAL: 190 MS
EKG Q-T INTERVAL: 446 MS
EKG QRS DURATION: 92 MS
EKG QTC CALCULATION (BAZETT): 498 MS
EKG R AXIS: 15 DEGREES
EKG T AXIS: 198 DEGREES
EKG VENTRICULAR RATE: 75 BPM
EOSINOPHIL # BLD: 0.1 K/UL (ref 0–0.4)
EOSINOPHIL NFR BLD: 1 % (ref 0–5)
ERYTHROCYTE [DISTWIDTH] IN BLOOD BY AUTOMATED COUNT: 17 % (ref 11.6–14.5)
GLUCOSE SERPL-MCNC: 115 MG/DL (ref 74–99)
HCT VFR BLD AUTO: 33.4 % (ref 35–45)
HGB BLD-MCNC: 10 G/DL (ref 12–16)
IMM GRANULOCYTES # BLD AUTO: 0 K/UL
IMM GRANULOCYTES NFR BLD AUTO: 0 %
LACTATE SERPL-SCNC: 1.6 MMOL/L (ref 0.4–2)
LYMPHOCYTES # BLD: 1.3 K/UL (ref 0.9–3.6)
LYMPHOCYTES NFR BLD: 10 % (ref 21–52)
MCH RBC QN AUTO: 25.2 PG (ref 24–34)
MCHC RBC AUTO-ENTMCNC: 29.9 G/DL (ref 31–37)
MCV RBC AUTO: 84.1 FL (ref 78–100)
MONOCYTES # BLD: 1.5 K/UL (ref 0.05–1.2)
MONOCYTES NFR BLD: 12 % (ref 3–10)
NEUTS SEG # BLD: 9.8 K/UL (ref 1.8–8)
NEUTS SEG NFR BLD: 77 % (ref 40–73)
NRBC # BLD: 0 K/UL (ref 0–0.01)
NRBC BLD-RTO: 0 PER 100 WBC
PLATELET # BLD AUTO: 477 K/UL (ref 135–420)
PLATELET COMMENT: ABNORMAL
PMV BLD AUTO: 9.5 FL (ref 9.2–11.8)
POTASSIUM SERPL-SCNC: 3.8 MMOL/L (ref 3.5–5.5)
RBC # BLD AUTO: 3.97 M/UL (ref 4.2–5.3)
RBC MORPH BLD: ABNORMAL
SODIUM SERPL-SCNC: 138 MMOL/L (ref 136–145)
TROPONIN I SERPL HS-MCNC: 81 NG/L (ref 0–54)
WBC # BLD AUTO: 12.7 K/UL (ref 4.6–13.2)

## 2023-12-06 PROCEDURE — G0378 HOSPITAL OBSERVATION PER HR: HCPCS

## 2023-12-06 PROCEDURE — 2580000003 HC RX 258: Performed by: NURSE PRACTITIONER

## 2023-12-06 PROCEDURE — 83605 ASSAY OF LACTIC ACID: CPT

## 2023-12-06 PROCEDURE — 2580000003 HC RX 258: Performed by: INTERNAL MEDICINE

## 2023-12-06 PROCEDURE — 97161 PT EVAL LOW COMPLEX 20 MIN: CPT

## 2023-12-06 PROCEDURE — 84484 ASSAY OF TROPONIN QUANT: CPT

## 2023-12-06 PROCEDURE — 80048 BASIC METABOLIC PNL TOTAL CA: CPT

## 2023-12-06 PROCEDURE — 73700 CT LOWER EXTREMITY W/O DYE: CPT

## 2023-12-06 PROCEDURE — 94640 AIRWAY INHALATION TREATMENT: CPT

## 2023-12-06 PROCEDURE — 36415 COLL VENOUS BLD VENIPUNCTURE: CPT

## 2023-12-06 PROCEDURE — 85025 COMPLETE CBC W/AUTO DIFF WBC: CPT

## 2023-12-06 PROCEDURE — 94761 N-INVAS EAR/PLS OXIMETRY MLT: CPT

## 2023-12-06 PROCEDURE — 6370000000 HC RX 637 (ALT 250 FOR IP): Performed by: NURSE PRACTITIONER

## 2023-12-06 PROCEDURE — 93306 TTE W/DOPPLER COMPLETE: CPT

## 2023-12-06 PROCEDURE — 6360000002 HC RX W HCPCS: Performed by: NURSE PRACTITIONER

## 2023-12-06 RX ORDER — CLOTRIMAZOLE 1 %
1 CREAM (GRAM) TOPICAL 2 TIMES DAILY
Status: ON HOLD | COMMUNITY
End: 2023-12-07 | Stop reason: HOSPADM

## 2023-12-06 RX ORDER — 0.9 % SODIUM CHLORIDE 0.9 %
500 INTRAVENOUS SOLUTION INTRAVENOUS ONCE
Status: COMPLETED | OUTPATIENT
Start: 2023-12-06 | End: 2023-12-06

## 2023-12-06 RX ORDER — TRAMADOL HYDROCHLORIDE 50 MG/1
50 TABLET ORAL DAILY PRN
COMMUNITY

## 2023-12-06 RX ORDER — COLCHICINE 0.6 MG/1
0.6 TABLET ORAL DAILY
COMMUNITY

## 2023-12-06 RX ORDER — FERROUS FUMARATE 324(106)MG
324 TABLET ORAL EVERY OTHER DAY
COMMUNITY

## 2023-12-06 RX ORDER — ALBUTEROL SULFATE 90 UG/1
2 AEROSOL, METERED RESPIRATORY (INHALATION) EVERY 4 HOURS PRN
COMMUNITY

## 2023-12-06 RX ORDER — ACETAMINOPHEN 325 MG/1
325 TABLET ORAL EVERY 4 HOURS PRN
COMMUNITY

## 2023-12-06 RX ADMIN — FERROUS SULFATE TAB 325 MG (65 MG ELEMENTAL FE) 325 MG: 325 (65 FE) TAB at 09:12

## 2023-12-06 RX ADMIN — SODIUM CHLORIDE 500 ML: 9 INJECTION, SOLUTION INTRAVENOUS at 12:38

## 2023-12-06 RX ADMIN — PANTOPRAZOLE SODIUM 40 MG: 40 TABLET, DELAYED RELEASE ORAL at 06:31

## 2023-12-06 RX ADMIN — Medication 2 PUFF: at 07:27

## 2023-12-06 RX ADMIN — CARVEDILOL 25 MG: 12.5 TABLET, FILM COATED ORAL at 09:11

## 2023-12-06 RX ADMIN — SODIUM CHLORIDE 500 ML: 9 INJECTION, SOLUTION INTRAVENOUS at 11:45

## 2023-12-06 RX ADMIN — LOSARTAN POTASSIUM 25 MG: 25 TABLET, FILM COATED ORAL at 09:11

## 2023-12-06 RX ADMIN — GABAPENTIN 100 MG: 100 CAPSULE ORAL at 21:18

## 2023-12-06 RX ADMIN — ACETAMINOPHEN 650 MG: 325 TABLET ORAL at 21:17

## 2023-12-06 RX ADMIN — SODIUM CHLORIDE, PRESERVATIVE FREE 10 ML: 5 INJECTION INTRAVENOUS at 21:18

## 2023-12-06 RX ADMIN — ATORVASTATIN CALCIUM 40 MG: 10 TABLET, FILM COATED ORAL at 21:17

## 2023-12-06 RX ADMIN — SODIUM CHLORIDE, PRESERVATIVE FREE 10 ML: 5 INJECTION INTRAVENOUS at 09:17

## 2023-12-06 RX ADMIN — SACUBITRIL AND VALSARTAN 1 TABLET: 24; 26 TABLET, FILM COATED ORAL at 09:12

## 2023-12-06 RX ADMIN — FLUTICASONE PROPIONATE 2 SPRAY: 50 SPRAY, METERED NASAL at 09:56

## 2023-12-06 RX ADMIN — FUROSEMIDE 20 MG: 20 TABLET ORAL at 09:12

## 2023-12-06 RX ADMIN — SPIRONOLACTONE 12.5 MG: 25 TABLET ORAL at 09:12

## 2023-12-06 RX ADMIN — MONTELUKAST 10 MG: 10 TABLET, FILM COATED ORAL at 21:18

## 2023-12-06 RX ADMIN — POTASSIUM CHLORIDE 20 MEQ: 750 TABLET, EXTENDED RELEASE ORAL at 09:11

## 2023-12-06 RX ADMIN — Medication 2 PUFF: at 19:26

## 2023-12-06 RX ADMIN — ACETAMINOPHEN 650 MG: 325 TABLET ORAL at 09:12

## 2023-12-06 RX ADMIN — ENOXAPARIN SODIUM 40 MG: 100 INJECTION SUBCUTANEOUS at 09:12

## 2023-12-06 ASSESSMENT — PAIN SCALES - WONG BAKER
WONGBAKER_NUMERICALRESPONSE: 0
WONGBAKER_NUMERICALRESPONSE: 0

## 2023-12-06 ASSESSMENT — PAIN SCALES - GENERAL
PAINLEVEL_OUTOF10: 0
PAINLEVEL_OUTOF10: 8
PAINLEVEL_OUTOF10: 5
PAINLEVEL_OUTOF10: 2

## 2023-12-06 ASSESSMENT — PAIN DESCRIPTION - DESCRIPTORS
DESCRIPTORS: ACHING
DESCRIPTORS: ACHING;DISCOMFORT

## 2023-12-06 ASSESSMENT — PAIN DESCRIPTION - ORIENTATION
ORIENTATION: RIGHT
ORIENTATION: RIGHT

## 2023-12-06 ASSESSMENT — PAIN DESCRIPTION - LOCATION
LOCATION: HIP
LOCATION: HIP

## 2023-12-06 ASSESSMENT — PAIN - FUNCTIONAL ASSESSMENT: PAIN_FUNCTIONAL_ASSESSMENT: ACTIVITIES ARE NOT PREVENTED

## 2023-12-06 NOTE — PROGRESS NOTES
Goals-   1- grooming with mod I   2- UE ADLs with mod I   3- LE ADLs with setup   4- Toileting with mod I     OCCUPATIONAL THERAPY EVALUATION    Patient: Glen Melara (57 y.o. female)  Date: 12/6/2023  Primary Diagnosis: Leukocytosis [D72.829]  Pericardial effusion [I31.39]  Fever, unspecified fever cause [R50.9]  Leukocytosis, unspecified type [D72.829]       Precautions: fall      PLOF: lives at home with family helping with caregiving tasks. Uses cane or rollator for mobility    ASSESSMENT :  Based on the objective data described below, the patient presents with declines in basic ADLs and mobilty. Patient will benefit from skilled intervention to address the above impairments. Patient's rehabilitation potential is considered to be good  Factors which may influence rehabilitation potential include:   []             None noted  [x]             Mental ability/status  []             Medical condition  [x]             Home/family situation and support systems  [x]             Safety awareness  [x]             Pain tolerance/management  []             Other:      PLAN :  Recommendations and Planned Interventions:   [x]               Self Care Training                  [x]      Therapeutic Activities  [x]               Functional Mobility Training   []      Cognitive Retraining  [x]               Therapeutic Exercises           []      Endurance Activities  []               Balance Training                    [x]      Neuromuscular Re-Education  []               Visual/Perceptual Training     [x]      Home Safety Training  [x]               Patient Education                   [x]      Family Training/Education  []               Other (comment):    Frequency/Duration: Patient will be followed by occupational therapy 1-2 times per day/4-7 days per week to address goals.   Discharge Recommendations: TBD    Further Equipment Recommendations for Discharge: has walker    AM PAC score-  16 /24;    current research shows that

## 2023-12-06 NOTE — ED NOTES
Assumed care of patient from Dr. Brianna Yang. I was asked to review CT imaging results then admit patient for observation for low-grade fever, leukocytosis. Saw patient who was laying in hospital bed comfortably, she had no complaints. With my exam also mildly tender right hip. Patient afebrile in ED, low-grade fever subjective. She has white count 14.5 with no shift, patient has history of COPD and finished steroids about 2 weeks ago. CTA chest shows small bilateral pleural effusions and large pericardial effusion. CT abdomen same. I reassessed the patient who has new pericardial rub, vital signs stable, she has no chest pain or shortness of breath. Discussed patient with hospitalist, Kalpana Lomeli NP, patient will be admitted for observation for the low-grade fever, leukocytosis and will need echocardiogram to reassess pericardial effusion.      Andreia Mitchell MD  12/05/23 2030       Andreia Mitchell MD  12/05/23 2031

## 2023-12-06 NOTE — PROGRESS NOTES
Hospitalist Progress Note             Date of Service:  2023  NAME:  Glen Soto  :  1940  MRN:  [id number]    Assessment / Plan:  Leukocytosis, fever- wbc improving, no further fevers, lactic acid neg, fu cult in am, if neg ok for dc  -Patient reports intermittent fever x3 days. Afebrile on admission. -WBC 14.5.  -Covid/Flu swabs negative. -UA negative. -CXR personally reviewed by me, no acute findings. -CTA chest personally reviewed by me: no PE, small Radhames pleural effusions, large pericardial effusion, age-indeterminate T6 compression deformity, diverticulosis coli without evidence of diverticulitis. -CT Abd/Pel personally reviewed by me: same as CTA chest.   -Given Doxycycline in ED. -Blood and urine cultures pending.   -Procal 0.14.  -Repeat CBC in AM.   -Will hold off on further antibiotics at this time. 2. Large pericardial effusion- echo pending, cardio following  -Noted on CTs.  -Echo in AM.   -Cardiology C/S. -Hold off on IVF at this time. -Monitor on tele. 3.  Right hip pain. - pain involves knee and prox tib/fib, check ct knee and hip  -Reviewed Right hip/pelvis xray. No acute issues. -PT/OT consult. -PRN tylenol for pain. 4. H/O Chronic sytolic CHF  -Last echo 4959 revealed EF 20-25% with severe global hypokinesis, abnormal diastolic dysfuntion.  -Continue Entresto, Lasix, Aldactone. -Monitor I&Os. -1500ml fluid restriction. 5. H/O HTN  -Continue Coreg, Losartan and Aldactone per home dose. -Monitor VS per unit protocol. 6. H/O COPD, Asthma  -Does not wear home O2. Currently on RA. O2 sats %. -Continue bronchodilators PRN. -RT following. 7. GERD  -Continue protonix. Review of Systems:   Pertinent items are noted in HPI. Vital Signs:    Last 24hrs VS reviewed since prior progress note.  Most recent are:  Vitals:    23

## 2023-12-06 NOTE — PROGRESS NOTES
Physical Therapy  Attempt to treat pt again this afternoon and she requests to rest. Will return tomorrow for further tx.   Gigi Howard, PT, DPT

## 2023-12-06 NOTE — CARE COORDINATION
Case Management Assessment  Initial Evaluation    Date/Time of Evaluation: 12/6/2023 10:38 AM  Assessment Completed by: Neo Roche    If patient is discharged prior to next notation, then this note serves as note for discharge by case management. Patient Name: Shawn Li                   YOB: 1940  Diagnosis: Leukocytosis [D72.829]  Pericardial effusion [I31.39]  Fever, unspecified fever cause [R50.9]  Leukocytosis, unspecified type [D72.829]                   Date / Time: 12/5/2023  5:09 PM    Patient Admission Status: Observation   Readmission Risk (Low < 19, Mod (19-27), High > 27): Readmission Risk Score: 16.4    Current PCP: Acacia Pizarro MD  PCP verified by CM? Chart Reviewed: Yes      History Provided by:    Patient Orientation:      Patient Cognition:      Hospitalization in the last 30 days (Readmission):  No    If yes, Readmission Assessment in CM Navigator will be completed. Advance Directives:      Code Status: Full Code   Patient's Primary Decision Maker is:        Discharge Planning:    Patient lives with: Other (Comment) Type of Home: House  Primary Care Giver:    Patient Support Systems include: Family Members   Current Financial resources:    Current community resources:    Current services prior to admission: Durable Medical Equipment            Current DME: Stan Higgins            Type of Home Care services:  Aide Services    ADLS  Prior functional level:    Current functional level:      PT AM-PAC:   /24  OT AM-PAC:   /24    Family can provide assistance at DC: Would you like Case Management to discuss the discharge plan with any other family members/significant others, and if so, who?     Plans to Return to Present Housing:    Other Identified Issues/Barriers to RETURNING to current housing: yes  Potential Assistance needed at discharge: Durable Medical Equipment            Potential DME: Bedside [employer]  Patient expects to discharge to: [facility] for

## 2023-12-06 NOTE — CONSULTS
CARDIOLOGY CONSULTATION    REASON FOR CONSULT: Pericardial effusion     REQUESTING PROVIDER: STEVE Baeza    CHIEF COMPLAINT:  Fever and right hip pain    HISTORY OF PRESENT ILLNESS:   [place] is a 78y.o. year-old female with past medical history significant for CAD, HTN, HFrEF, pericardial effusion, paroxysmal atrial fibrillation, COPD, h/o GI bleed, anemia, and atrial septal defect who was evaluated in the ED with chief complaint of fever and right hip pain and now followed for HFrEF and chronic pericardial effusion. Patient reports recently completing a course of PO ABX and steroids for URI. She continues to endorse wheezing, shortness of breath and productive cough with white frothy sputum. States she feels  weak and off balance with near falls at home. She denies CP or palpitations. No nausea or vomiting. Fever and chills at home. She is followed by [facility] with recent follow-up in November. Records from hospital admission course thus far reviewed. Telemetry reviewed. No acute events overnight. INPATIENT MEDICATIONS:  Home medications reviewed.     Current Facility-Administered Medications:     sodium chloride flush 0.9 % injection 5-40 mL, 5-40 mL, IntraVENous, 2 times per day, Familia Lugo APRN - NP, 10 mL at 12/06/23 0917    sodium chloride flush 0.9 % injection 5-40 mL, 5-40 mL, IntraVENous, PRN, Familia Lugo APRN - NP    0.9 % sodium chloride infusion, , IntraVENous, PRN, Familia Lugo APRN - NP    enoxaparin (LOVENOX) injection 40 mg, 40 mg, SubCUTAneous, Daily, Familia Lugo APRN - NP, 40 mg at 12/06/23 0912    ondansetron (ZOFRAN-ODT) disintegrating tablet 4 mg, 4 mg, Oral, Q8H PRN **OR** ondansetron (ZOFRAN) injection 4 mg, 4 mg, IntraVENous, Q6H PRN, Familia Lugo APRN - NP    polyethylene glycol (GLYCOLAX) packet 17 g, 17 g, Oral, Daily PRN, Familia Lugo APRN - NP    acetaminophen (TYLENOL) tablet 650 mg, 650 mg, Oral, Q6H PRN, 650 mg at

## 2023-12-06 NOTE — PROGRESS NOTES
2150  Received care of pt via stretcher from ER to room 244. Pt able to slide self from stretcher to bed and tolerated well. Pt denies pain with transfer. Pt routine assessment and admission completed. Pt complaint free at this time. Call bell in reach. 2215  Quail and juice and crackers given per request.    2230  HS meds accepted without difficulty. 0130  Pt sleeping with no distress noted. 0400  Pt awakened for labs. No complaints verbalized. 0615  Pt sleeping and arouses easily on rounds. Pt is complaint free. Pt requested and received saltine crackers at this time.

## 2023-12-06 NOTE — PROGRESS NOTES
Received report from Sylvie, RN  9170- Assumed care of patient. Patient resting with eyes closed. No distress noted, patient has no complaints at this time. Call bell within reach. Bed in lowest position. Bed Alarm is on.   0730- Vital signs obtained. Assessment completed. CBWR.  0900- Morning meds administered. PRN tylenol provided for R hip pain. 1000- PT at bedside. 1100- Patient BP 87/42. Called Dr. Lisa James about her BP, new orders of 500 ml bolus. Monitored patient for fluid overload d/t her history. 1232- Patients BP 86/42 after the bolus. Called Dr. Lisa James. New orders for another 500 ml bolus. 1314- Patients BP rechecked 90/46. Called Dr. Lisa James, No new orders at this time. 1400- Patients BP 73/46 sitting up in bed. Laying down flat she was 90/47. Called Dr. Lisa James abotu this. No new orders, was told to call doctor if patients systolic BP is below 75.   1545- Patient changed. New pure wick placed. CBWR. Family at bedside. 1615- Patient down to [address]- Patient returned from [address]- Patient changed of urine. New pure wick placed. 1830- Report given to SANDRA Dias

## 2023-12-06 NOTE — THERAPY EVALUATION
PHYSICAL THERAPY EVALUATION    Patient: Wilber Colon (50 y.o. female)  Date: 12/6/2023  Physical Therapy Time:   PT Individual Minutes  Time In: 6431  Time Out: 1006  Minutes: 18  Timed Minute Breakdown:  18 Eval     Primary Diagnosis: Leukocytosis [D72.829]  Pericardial effusion [I31.39]  Fever, unspecified fever cause [R50.9]  Leukocytosis, unspecified type [D72.829] Leukocytosis  Present on Admission:   Leukocytosis        Precautions:          Assessment: Patient was found in bed alert and oriented with granddaughter in room. Patient needed min assist for R LE placement with supine <> sit. The patient needed CGA with sit <> stand. The patient was able to walk 30 feet with RW with SBA. Patients main concern and limiting impairment is pain in R hip and R knee. The patient was then returned to bed with CBWR and nursing notified. Performance Deficits/Impairments: Decreased ADL status; Decreased functional mobility ; Decreased strength;Decreased endurance;Decreased balance; Increased pain  Treatment Diagnosis: Difficulty walking  Therapy Prognosis: Good  Decision Making: Low Complexity  Discharge Recommendations: Home with Home health PT    Conditions Requiring skilled therapeutic intervention:  Performance Deficits/Impairments: Decreased ADL status; Decreased functional mobility ; Decreased strength;Decreased endurance;Decreased balance; Increased pain     Evaluation Activity Tolerance:   Activity Tolerance  Activity Tolerance: Patient limited by pain    Equipment Recommendations for Discharge:  PT Equipment Recommendations  Equipment Needed: No    AM-PAC  AM-PAC Inpatient Mobility Raw Score : 21; Current research shows that an AM-PAC score of 17 or less is typically not associated with a discharge to the patient's home setting, whereas a score of 18 or greater is typically associated with a discharge to the patient's home setting.     Factors which may influence rehabilitation potential include:  Pain tolerance/Management

## 2023-12-06 NOTE — PROGRESS NOTES
Advance Care Planning     Advance Care Planning Inpatient Note  Spiritual Care Department    Today's Date: 12/6/2023  Unit: [address]    Received request from rounding. Upon review of chart and communication with care team, patient's decision making abilities are not in question. . Patient was/were present in the room during visit. Goals of ACP Conversation:  Discuss advance care planning documents    Health Care Decision Makers:     No healthcare decision makers have been documented.   Click here to complete [address] including selection of the Healthcare Decision Maker Relationship (ie \"Primary\")  Summary:  No Decision Maker named by patient at this time    Advance Care Planning Documents (Patient Wishes):  None     Assessment:     12/06/23 1144   Encounter Summary   Encounter Overview/Reason  Initial Encounter   Service Provided For: Patient   Referral/Consult From:  [address] Family members   Last Encounter  12/06/23  ([facility])   Complexity of Encounter Low   Begin Time 1114   End Time  1135   Total Time Calculated 21 min   Advance Care Planning   Type ACP conversation   Assessment/Intervention/Outcome   Assessment Compromised coping;Coping   Intervention Active listening;Prayer (assurance of)/[place];Sustaining Presence/Ministry of presence   Outcome Expressed Gratitude;Receptive         Interventions:  Requested patient/family to submit existing document for our records: None    Care Preferences Communicated:   No    Outcomes/Plan:  ACP Discussion: Postponed    Electronically signed by Aleida Douglass on 12/6/2023 at 11:46 AM

## 2023-12-06 NOTE — ED NOTES
TRANSFER - OUT REPORT:    Verbal report given to Jolene Dunn  being transferred to  for routine progression of patient care       Report consisted of patient's Situation, Background, Assessment and   Recommendations(SBAR). Information from the following report(s) Nurse Handoff Report was reviewed with the receiving nurse. Lines:   Peripheral IV 12/05/23 Proximal;Right Forearm (Active)   Site Assessment Clean, dry & intact 12/05/23 1912   Line Status Sluggish blood return 12/05/23 1912   Phlebitis Assessment No symptoms 12/05/23 1912   Infiltration Assessment 0 12/05/23 1912        Opportunity for questions and clarification was provided.       Patient transported with:  Monitor and Registered Nurse          Martha Gates RN  12/05/23 3136

## 2023-12-07 VITALS
HEART RATE: 79 BPM | BODY MASS INDEX: 24.07 KG/M2 | WEIGHT: 130.8 LBS | TEMPERATURE: 100.9 F | RESPIRATION RATE: 19 BRPM | SYSTOLIC BLOOD PRESSURE: 148 MMHG | OXYGEN SATURATION: 98 % | HEIGHT: 62 IN | DIASTOLIC BLOOD PRESSURE: 80 MMHG

## 2023-12-07 LAB
ANION GAP SERPL CALC-SCNC: 4 MMOL/L (ref 3–18)
BACTERIA SPEC CULT: NORMAL
BASOPHILS # BLD: 0 K/UL (ref 0–0.1)
BASOPHILS NFR BLD: 0 % (ref 0–2)
BUN SERPL-MCNC: 14 MG/DL (ref 7–18)
BUN/CREAT SERPL: 13 (ref 12–20)
CA-I BLD-MCNC: 8.6 MG/DL (ref 8.5–10.1)
CHLORIDE SERPL-SCNC: 109 MMOL/L (ref 100–111)
CO2 SERPL-SCNC: 26 MMOL/L (ref 21–32)
CREAT SERPL-MCNC: 1.06 MG/DL (ref 0.6–1.3)
DIFFERENTIAL METHOD BLD: ABNORMAL
EOSINOPHIL # BLD: 0.1 K/UL (ref 0–0.4)
EOSINOPHIL NFR BLD: 1 % (ref 0–5)
ERYTHROCYTE [DISTWIDTH] IN BLOOD BY AUTOMATED COUNT: 17 % (ref 11.6–14.5)
GLUCOSE SERPL-MCNC: 99 MG/DL (ref 74–99)
HCT VFR BLD AUTO: 34.2 % (ref 35–45)
HGB BLD-MCNC: 10.3 G/DL (ref 12–16)
IMM GRANULOCYTES # BLD AUTO: 0 K/UL
IMM GRANULOCYTES NFR BLD AUTO: 0 %
LYMPHOCYTES # BLD: 2.1 K/UL (ref 0.9–3.6)
LYMPHOCYTES NFR BLD: 17 % (ref 21–52)
Lab: NORMAL
MAGNESIUM SERPL-MCNC: 2.1 MG/DL (ref 1.6–2.6)
MCH RBC QN AUTO: 25.2 PG (ref 24–34)
MCHC RBC AUTO-ENTMCNC: 30.1 G/DL (ref 31–37)
MCV RBC AUTO: 83.6 FL (ref 78–100)
MONOCYTES # BLD: 1.5 K/UL (ref 0.05–1.2)
MONOCYTES NFR BLD: 12 % (ref 3–10)
NEUTS BAND NFR BLD MANUAL: 1 % (ref 0–5)
NEUTS SEG # BLD: 8.4 K/UL (ref 1.8–8)
NEUTS SEG NFR BLD: 66 % (ref 40–73)
NRBC # BLD: 0 K/UL (ref 0–0.01)
NRBC BLD-RTO: 0 PER 100 WBC
OTHER CELLS NFR BLD MANUAL: 3 %
PHOSPHATE SERPL-MCNC: 3.3 MG/DL (ref 2.5–4.9)
PLATELET # BLD AUTO: 511 K/UL (ref 135–420)
PLATELET COMMENT: ABNORMAL
PMV BLD AUTO: 9.3 FL (ref 9.2–11.8)
POTASSIUM SERPL-SCNC: 4.1 MMOL/L (ref 3.5–5.5)
RBC # BLD AUTO: 4.09 M/UL (ref 4.2–5.3)
RBC MORPH BLD: ABNORMAL
SODIUM SERPL-SCNC: 139 MMOL/L (ref 136–145)
WBC # BLD AUTO: 12.5 K/UL (ref 4.6–13.2)

## 2023-12-07 PROCEDURE — 85025 COMPLETE CBC W/AUTO DIFF WBC: CPT

## 2023-12-07 PROCEDURE — 6360000002 HC RX W HCPCS: Performed by: NURSE PRACTITIONER

## 2023-12-07 PROCEDURE — 84100 ASSAY OF PHOSPHORUS: CPT

## 2023-12-07 PROCEDURE — G0378 HOSPITAL OBSERVATION PER HR: HCPCS

## 2023-12-07 PROCEDURE — 83735 ASSAY OF MAGNESIUM: CPT

## 2023-12-07 PROCEDURE — 6370000000 HC RX 637 (ALT 250 FOR IP): Performed by: NURSE PRACTITIONER

## 2023-12-07 PROCEDURE — 36415 COLL VENOUS BLD VENIPUNCTURE: CPT

## 2023-12-07 PROCEDURE — 94640 AIRWAY INHALATION TREATMENT: CPT

## 2023-12-07 PROCEDURE — 94761 N-INVAS EAR/PLS OXIMETRY MLT: CPT

## 2023-12-07 PROCEDURE — 80048 BASIC METABOLIC PNL TOTAL CA: CPT

## 2023-12-07 PROCEDURE — 2580000003 HC RX 258: Performed by: NURSE PRACTITIONER

## 2023-12-07 PROCEDURE — 94664 DEMO&/EVAL PT USE INHALER: CPT

## 2023-12-07 RX ORDER — AMOXICILLIN AND CLAVULANATE POTASSIUM 875; 125 MG/1; MG/1
1 TABLET, FILM COATED ORAL 2 TIMES DAILY
Qty: 10 TABLET | Refills: 0 | Status: SHIPPED | OUTPATIENT
Start: 2023-12-07 | End: 2023-12-12

## 2023-12-07 RX ADMIN — ENOXAPARIN SODIUM 40 MG: 100 INJECTION SUBCUTANEOUS at 08:48

## 2023-12-07 RX ADMIN — SPIRONOLACTONE 12.5 MG: 25 TABLET ORAL at 08:48

## 2023-12-07 RX ADMIN — SODIUM CHLORIDE, PRESERVATIVE FREE 10 ML: 5 INJECTION INTRAVENOUS at 08:50

## 2023-12-07 RX ADMIN — POTASSIUM CHLORIDE 20 MEQ: 750 TABLET, EXTENDED RELEASE ORAL at 08:48

## 2023-12-07 RX ADMIN — Medication 2 PUFF: at 07:06

## 2023-12-07 RX ADMIN — ACETAMINOPHEN 650 MG: 325 TABLET ORAL at 08:48

## 2023-12-07 RX ADMIN — PANTOPRAZOLE SODIUM 40 MG: 40 TABLET, DELAYED RELEASE ORAL at 06:16

## 2023-12-07 RX ADMIN — FLUTICASONE PROPIONATE 2 SPRAY: 50 SPRAY, METERED NASAL at 08:52

## 2023-12-07 NOTE — PROGRESS NOTES
1915  Received care of pt lying in bed with eyes closed. Pt awakens easily when name called. Pt is alert and oriented x 4. Routine assessment and vs completed. Pt is complaint free at this time. Pt repositioned for comfort. VSS at this time. Call bell in reach. 2130  VSS. HS meds accepted without difficulty. Tylenol tabs 2 po given for complaint of right hip pain. [address] [place] [address] [place] with no distress noted. 0010  Pt sleeping and awakened for VS.  Pt is complaint free. 0400  Pt awakened for vs and labs. Pt is complaint free at this time. 0545  Complete bath and linen change done. Pt tolerated well. 0630 Pt sleeping with no distress noted.

## 2023-12-07 NOTE — PROGRESS NOTES
Received report from Yesenia Alcantara RN  8400- Assumed care of patient. Patient resting with eyes closed. No distress noted, patient has no complaints at this time. Call bell within reach. Bed in lowest position. Bed Alarm is on.   0730- Assessment and vital signs obtained. Skyler- Patient medicated according to [facility]. Patient tolerated well. Patients temp 100.9, Patient received PRN tylenol. CBWR.   1030- Patient iv/ tele off. Patient provided with d/c instructions. Verbalize understanding   1100- Patient taken downstairs via wheelchair by PCT.

## 2023-12-07 NOTE — DISCHARGE SUMMARY
Discharge Summary       PATIENT ID: Jaime Irby  MRN: [id number]   YOB: 1940    DATE OF ADMISSION: 12/5/2023  5:09 PM    DATE OF DISCHARGE: 12/07/23    PRIMARY CARE PROVIDER: Jean Azevedo MD     ATTENDING PHYSICIAN: Julissa Baugh MD  DISCHARGING PROVIDER: Julissa Baugh MD        CONSULTATIONS: IP CONSULT TO CARDIOLOGY    PROCEDURES/SURGERIES: * No surgery found *    ADMITTING [address] [facility] COURSE:   Jaime Irby is a 78 y.o.  female who presented to the ED earlier this evening for complaints of intermittent fever x3 days along with right hip pain. Recently completed antibiotics and steroids in the last 2-3 weeks for URI. Does c/o wheezing and cough productive for small amount of white, frothy sputum. She reports ambulating with her cane or rollator, endorses almost falling twice today prior to coming to the ED. While in the ED she underwent CTA which revealed small bilateral pleural effusions as well as a large pericardial effusion. CT of the abdomen revealed the same. Her labs were noted to have a slight leukocytosis of 14.5 with no shift. Troponins were flat at 90 and 96. CXR and Right hip/pelvis xray were both negative for acute findings She was given doxycycline and hydrocodone. She was seen and examined by me resting in bed with no s/s of acute distress. Does have audible wheezing noted bilaterally throughout all lung fields. C/o right hip pain from buttocks and down her leg. She reports that her granddaughter comes and helps take care of her throughout the day. She has a nephew and son that sometimes come and stay with her at night. Hospitalist services were asked to bring the patient in for observation and further workup. Her expected LOS is at least 24 - 48 hours.           DISCHARGE DIAGNOSES / PLAN:      Assessment / Plan:  Leukocytosis, fever- wbc improving, no further fevers, lactic acid neg, fu cult in am, if neg ok for dc  -Patient reports intermittent

## 2023-12-07 NOTE — PROGRESS NOTES
CARDIOLOGY PROGRESS NOTE      Patient Name: Sarah Leonard  Age: 78 y.o. Faiza Mosqueda  IHR:6/31[birth year]  MRN: [id number]    Patient seen and examined. This is a patient with a history of  CAD, HTN, HFrEF, pericardial effusion, paroxysmal atrial fibrillation, COPD, h/o GI bleed, anemia, and atrial septal defect who presented with fever and right hip pain now being followed for HFrEF and pericardial effusion. Patient denies CP or palpitations. Mild BERNABE that she states is her baseline. Endorses hip and back pain while at rest in bed. No nausea or vomiting. No other complaints reported. Telemetry reviewed, there were no acute events noted in the past 24 hours. Pertinent review of systems items noted above, all other systems are negative. Current medications reviewed. Physical Examination    Allergies   Allergen Reactions    Cefaclor Hives, Itching and Other (See Comments)    Ace Inhibitors Other (See Comments), Hives and Rash     Not sure, was a long time ago    Cephalosporins Itching and Rash    Levofloxacin Rash    Telithromycin Hives, Itching and Other (See Comments)     PHYSICAL EXAMINATION:    VS: Temp 100.9, /80, HR 98, BMI 23.92  General: Pleasant. Alert and Oriented x 4. Frail. Cardiovascular:  Rate and rhythm regular, No murmur. No JVD  Respiratory:  Lungs with expiratory wheeze throughout. Normal respiratory efforts. On RA. Abdomen:  Soft, flat and nontender  Extremities:  Right hip arthralgia.    Skin:  Dry, warm  Psych:  Normal affect    Labs reviewed:  Recent Results (from the past 12 hour(s))   Basic Metabolic Panel    Collection Time: 12/07/23  4:17 AM   Result Value Ref Range    Sodium 139 136 - 145 mmol/L    Potassium 4.1 3.5 - 5.5 mmol/L    Chloride 109 100 - 111 mmol/L    CO2 26 21 - 32 mmol/L    Anion Gap 4 3.0 - 18.0 mmol/L    Glucose 99 74 - 99 mg/dL    BUN 14 7 - 18 mg/dL    Creatinine 1.06 0.60 - 1.30 mg/dL    Bun/Cre Ratio 13 12 - 20      Est, Glom Filt Rate 51 (L) >60

## 2023-12-10 LAB
BACTERIA SPEC CULT: NORMAL
BACTERIA SPEC CULT: NORMAL
Lab: NORMAL
Lab: NORMAL

## 2023-12-11 LAB
BACTERIA SPEC CULT: NORMAL
BACTERIA SPEC CULT: NORMAL
Lab: NORMAL
Lab: NORMAL

## 2024-01-06 ENCOUNTER — HOSPITAL ENCOUNTER (INPATIENT)
Age: 84
LOS: 1 days | Discharge: HOME | DRG: 291 | End: 2024-01-08
Attending: EMERGENCY MEDICINE | Admitting: INTERNAL MEDICINE
Payer: MEDICARE

## 2024-01-06 ENCOUNTER — APPOINTMENT (OUTPATIENT)
Age: 84
DRG: 291 | End: 2024-01-06
Payer: MEDICARE

## 2024-01-06 DIAGNOSIS — I50.43 ACUTE ON CHRONIC COMBINED SYSTOLIC AND DIASTOLIC CHF (CONGESTIVE HEART FAILURE) (HCC): ICD-10-CM

## 2024-01-06 DIAGNOSIS — J44.1 COPD EXACERBATION (HCC): Primary | ICD-10-CM

## 2024-01-06 LAB
ALBUMIN SERPL-MCNC: 2.8 G/DL (ref 3.4–5)
ALBUMIN/GLOB SERPL: 0.6 (ref 0.8–1.7)
ALP SERPL-CCNC: 89 U/L (ref 45–117)
ALT SERPL-CCNC: 9 U/L (ref 13–56)
ANION GAP SERPL CALC-SCNC: 7 MMOL/L (ref 3–18)
ARTERIAL PATENCY WRIST A: YES
AST SERPL W P-5'-P-CCNC: 18 U/L (ref 10–38)
BASE DEFICIT BLDA-SCNC: 4 MMOL/L
BASOPHILS # BLD: 0.1 K/UL (ref 0–0.1)
BASOPHILS NFR BLD: 1 % (ref 0–2)
BDY SITE: ABNORMAL
BILIRUB SERPL-MCNC: 0.7 MG/DL (ref 0.2–1)
BNP SERPL-MCNC: ABNORMAL PG/ML (ref 0–1800)
BUN SERPL-MCNC: 9 MG/DL (ref 7–18)
BUN/CREAT SERPL: 8 (ref 12–20)
CA-I BLD-MCNC: 8.9 MG/DL (ref 8.5–10.1)
CHLORIDE SERPL-SCNC: 110 MMOL/L (ref 100–111)
CO2 SERPL-SCNC: 25 MMOL/L (ref 21–32)
COHGB MFR BLD: 0.4 % (ref 1–2)
CREAT SERPL-MCNC: 1.14 MG/DL (ref 0.6–1.3)
DIFFERENTIAL METHOD BLD: ABNORMAL
EOSINOPHIL # BLD: 0.9 K/UL (ref 0–0.4)
EOSINOPHIL NFR BLD: 7 % (ref 0–5)
ERYTHROCYTE [DISTWIDTH] IN BLOOD BY AUTOMATED COUNT: 20.7 % (ref 11.6–14.5)
FIO2 ON VENT: 28 %
GAS FLOW.O2 O2 DELIVERY SYS: 2 L/MIN
GLOBULIN SER CALC-MCNC: 4.6 G/DL (ref 2–4)
GLUCOSE SERPL-MCNC: 176 MG/DL (ref 74–99)
HCO3 BLDA-SCNC: 20 MMOL/L (ref 22–26)
HCT VFR BLD AUTO: 34.4 % (ref 35–45)
HGB BLD-MCNC: 10.4 G/DL (ref 12–16)
IMM GRANULOCYTES # BLD AUTO: 0.1 K/UL (ref 0–0.04)
IMM GRANULOCYTES NFR BLD AUTO: 0 % (ref 0–0.5)
LYMPHOCYTES # BLD: 2 K/UL (ref 0.9–3.6)
LYMPHOCYTES NFR BLD: 16 % (ref 21–52)
MCH RBC QN AUTO: 25.9 PG (ref 24–34)
MCHC RBC AUTO-ENTMCNC: 30.2 G/DL (ref 31–37)
MCV RBC AUTO: 85.6 FL (ref 78–100)
METHGB MFR BLD: 0.3 % (ref 0–1.4)
MONOCYTES # BLD: 0.7 K/UL (ref 0.05–1.2)
MONOCYTES NFR BLD: 5 % (ref 3–10)
NEUTS SEG # BLD: 8.6 K/UL (ref 1.8–8)
NEUTS SEG NFR BLD: 71 % (ref 40–73)
NRBC # BLD: 0 K/UL (ref 0–0.01)
NRBC BLD-RTO: 0 PER 100 WBC
OXYHGB MFR BLD: 91.3 % (ref 95–99)
PCO2 BLDA: 30 MMHG (ref 35–45)
PERFORMED BY:: ABNORMAL
PH BLDA: 7.43 (ref 7.35–7.45)
PLATELET # BLD AUTO: 377 K/UL (ref 135–420)
PMV BLD AUTO: 10.1 FL (ref 9.2–11.8)
PO2 BLDA: 70 MMHG (ref 80–100)
POTASSIUM SERPL-SCNC: 4.7 MMOL/L (ref 3.5–5.5)
PROT SERPL-MCNC: 7.4 G/DL (ref 6.4–8.2)
RBC # BLD AUTO: 4.02 M/UL (ref 4.2–5.3)
SAO2 % BLD: 92 % (ref 95–99)
SAO2% DEVICE SAO2% SENSOR NAME: ABNORMAL
SODIUM SERPL-SCNC: 142 MMOL/L (ref 136–145)
SPECIMEN SITE: ABNORMAL
TROPONIN I SERPL HS-MCNC: 143 NG/L (ref 0–54)
WBC # BLD AUTO: 12.4 K/UL (ref 4.6–13.2)

## 2024-01-06 PROCEDURE — 84484 ASSAY OF TROPONIN QUANT: CPT

## 2024-01-06 PROCEDURE — 2580000003 HC RX 258: Performed by: EMERGENCY MEDICINE

## 2024-01-06 PROCEDURE — 71045 X-RAY EXAM CHEST 1 VIEW: CPT

## 2024-01-06 PROCEDURE — 93005 ELECTROCARDIOGRAM TRACING: CPT | Performed by: EMERGENCY MEDICINE

## 2024-01-06 PROCEDURE — 96375 TX/PRO/DX INJ NEW DRUG ADDON: CPT

## 2024-01-06 PROCEDURE — 80053 COMPREHEN METABOLIC PANEL: CPT

## 2024-01-06 PROCEDURE — 6360000002 HC RX W HCPCS: Performed by: EMERGENCY MEDICINE

## 2024-01-06 PROCEDURE — 85025 COMPLETE CBC W/AUTO DIFF WBC: CPT

## 2024-01-06 PROCEDURE — 36415 COLL VENOUS BLD VENIPUNCTURE: CPT

## 2024-01-06 PROCEDURE — 99285 EMERGENCY DEPT VISIT HI MDM: CPT

## 2024-01-06 PROCEDURE — 82803 BLOOD GASES ANY COMBINATION: CPT

## 2024-01-06 PROCEDURE — 36600 WITHDRAWAL OF ARTERIAL BLOOD: CPT

## 2024-01-06 PROCEDURE — 94640 AIRWAY INHALATION TREATMENT: CPT

## 2024-01-06 PROCEDURE — 83880 ASSAY OF NATRIURETIC PEPTIDE: CPT

## 2024-01-06 PROCEDURE — 6370000000 HC RX 637 (ALT 250 FOR IP): Performed by: EMERGENCY MEDICINE

## 2024-01-06 PROCEDURE — 96374 THER/PROPH/DIAG INJ IV PUSH: CPT

## 2024-01-06 RX ORDER — AMLODIPINE BESYLATE 5 MG/1
TABLET ORAL
Status: ON HOLD | COMMUNITY
Start: 2022-12-29 | End: 2024-01-08 | Stop reason: HOSPADM

## 2024-01-06 RX ORDER — FUROSEMIDE 10 MG/ML
20 INJECTION INTRAMUSCULAR; INTRAVENOUS ONCE
Status: COMPLETED | OUTPATIENT
Start: 2024-01-06 | End: 2024-01-06

## 2024-01-06 RX ORDER — IPRATROPIUM BROMIDE AND ALBUTEROL SULFATE 2.5; .5 MG/3ML; MG/3ML
1 SOLUTION RESPIRATORY (INHALATION)
Status: COMPLETED | OUTPATIENT
Start: 2024-01-06 | End: 2024-01-06

## 2024-01-06 RX ORDER — CELECOXIB 200 MG/1
CAPSULE ORAL
Status: ON HOLD | COMMUNITY
Start: 2022-10-31 | End: 2024-01-08

## 2024-01-06 RX ADMIN — IPRATROPIUM BROMIDE AND ALBUTEROL SULFATE 1 DOSE: .5; 3 SOLUTION RESPIRATORY (INHALATION) at 22:57

## 2024-01-06 RX ADMIN — FUROSEMIDE 20 MG: 10 INJECTION, SOLUTION INTRAMUSCULAR; INTRAVENOUS at 23:15

## 2024-01-06 RX ADMIN — METHYLPREDNISOLONE SODIUM SUCCINATE 80 MG: 125 INJECTION INTRAMUSCULAR; INTRAVENOUS at 23:12

## 2024-01-06 ASSESSMENT — PAIN - FUNCTIONAL ASSESSMENT: PAIN_FUNCTIONAL_ASSESSMENT: NONE - DENIES PAIN

## 2024-01-07 LAB
EKG ATRIAL RATE: 114 BPM
EKG DIAGNOSIS: NORMAL
EKG P AXIS: 90 DEGREES
EKG P-R INTERVAL: 190 MS
EKG Q-T INTERVAL: 300 MS
EKG QRS DURATION: 68 MS
EKG QTC CALCULATION (BAZETT): 413 MS
EKG R AXIS: 64 DEGREES
EKG T AXIS: 133 DEGREES
EKG VENTRICULAR RATE: 114 BPM
TROPONIN I SERPL HS-MCNC: 141 NG/L (ref 0–54)
TROPONIN I SERPL HS-MCNC: 201 NG/L (ref 0–54)

## 2024-01-07 PROCEDURE — 36415 COLL VENOUS BLD VENIPUNCTURE: CPT

## 2024-01-07 PROCEDURE — 6360000002 HC RX W HCPCS: Performed by: EMERGENCY MEDICINE

## 2024-01-07 PROCEDURE — 84484 ASSAY OF TROPONIN QUANT: CPT

## 2024-01-07 PROCEDURE — 6370000000 HC RX 637 (ALT 250 FOR IP): Performed by: NURSE PRACTITIONER

## 2024-01-07 PROCEDURE — 2700000000 HC OXYGEN THERAPY PER DAY

## 2024-01-07 PROCEDURE — 94640 AIRWAY INHALATION TREATMENT: CPT

## 2024-01-07 PROCEDURE — 6360000002 HC RX W HCPCS: Performed by: NURSE PRACTITIONER

## 2024-01-07 PROCEDURE — 96376 TX/PRO/DX INJ SAME DRUG ADON: CPT

## 2024-01-07 PROCEDURE — 2580000003 HC RX 258: Performed by: NURSE PRACTITIONER

## 2024-01-07 PROCEDURE — 94761 N-INVAS EAR/PLS OXIMETRY MLT: CPT

## 2024-01-07 PROCEDURE — 1100000000 HC RM PRIVATE

## 2024-01-07 RX ORDER — SODIUM CHLORIDE 0.9 % (FLUSH) 0.9 %
5-40 SYRINGE (ML) INJECTION EVERY 12 HOURS SCHEDULED
Status: DISCONTINUED | OUTPATIENT
Start: 2024-01-07 | End: 2024-01-08 | Stop reason: HOSPADM

## 2024-01-07 RX ORDER — PANTOPRAZOLE SODIUM 40 MG/1
40 TABLET, DELAYED RELEASE ORAL
Status: DISCONTINUED | OUTPATIENT
Start: 2024-01-07 | End: 2024-01-08 | Stop reason: HOSPADM

## 2024-01-07 RX ORDER — CARVEDILOL 12.5 MG/1
25 TABLET ORAL 2 TIMES DAILY WITH MEALS
Status: DISCONTINUED | OUTPATIENT
Start: 2024-01-07 | End: 2024-01-08 | Stop reason: HOSPADM

## 2024-01-07 RX ORDER — SODIUM CHLORIDE 9 MG/ML
INJECTION, SOLUTION INTRAVENOUS PRN
Status: DISCONTINUED | OUTPATIENT
Start: 2024-01-07 | End: 2024-01-08 | Stop reason: HOSPADM

## 2024-01-07 RX ORDER — SPIRONOLACTONE 25 MG/1
12.5 TABLET ORAL DAILY
Status: DISCONTINUED | OUTPATIENT
Start: 2024-01-07 | End: 2024-01-08 | Stop reason: HOSPADM

## 2024-01-07 RX ORDER — POLYETHYLENE GLYCOL 3350 17 G/17G
17 POWDER, FOR SOLUTION ORAL DAILY PRN
Status: DISCONTINUED | OUTPATIENT
Start: 2024-01-07 | End: 2024-01-08 | Stop reason: HOSPADM

## 2024-01-07 RX ORDER — ACETAMINOPHEN 325 MG/1
650 TABLET ORAL EVERY 6 HOURS PRN
Status: DISCONTINUED | OUTPATIENT
Start: 2024-01-07 | End: 2024-01-08 | Stop reason: HOSPADM

## 2024-01-07 RX ORDER — ONDANSETRON 2 MG/ML
4 INJECTION INTRAMUSCULAR; INTRAVENOUS EVERY 6 HOURS PRN
Status: DISCONTINUED | OUTPATIENT
Start: 2024-01-07 | End: 2024-01-08 | Stop reason: HOSPADM

## 2024-01-07 RX ORDER — COLCHICINE 0.6 MG/1
0.6 CAPSULE ORAL DAILY
Status: DISCONTINUED | OUTPATIENT
Start: 2024-01-07 | End: 2024-01-08 | Stop reason: HOSPADM

## 2024-01-07 RX ORDER — ACETAMINOPHEN 650 MG/1
650 SUPPOSITORY RECTAL EVERY 6 HOURS PRN
Status: DISCONTINUED | OUTPATIENT
Start: 2024-01-07 | End: 2024-01-08 | Stop reason: HOSPADM

## 2024-01-07 RX ORDER — GABAPENTIN 100 MG/1
100 CAPSULE ORAL
Status: DISCONTINUED | OUTPATIENT
Start: 2024-01-07 | End: 2024-01-08 | Stop reason: HOSPADM

## 2024-01-07 RX ORDER — FERROUS SULFATE 325(65) MG
324 TABLET ORAL EVERY OTHER DAY
Status: DISCONTINUED | OUTPATIENT
Start: 2024-01-08 | End: 2024-01-08

## 2024-01-07 RX ORDER — ALPRAZOLAM 0.25 MG/1
0.25 TABLET ORAL EVERY 12 HOURS PRN
Status: DISCONTINUED | OUTPATIENT
Start: 2024-01-07 | End: 2024-01-08 | Stop reason: HOSPADM

## 2024-01-07 RX ORDER — FUROSEMIDE 10 MG/ML
20 INJECTION INTRAMUSCULAR; INTRAVENOUS ONCE
Status: COMPLETED | OUTPATIENT
Start: 2024-01-07 | End: 2024-01-07

## 2024-01-07 RX ORDER — COLCHICINE 0.6 MG/1
0.6 TABLET ORAL DAILY
Status: DISCONTINUED | OUTPATIENT
Start: 2024-01-07 | End: 2024-01-07

## 2024-01-07 RX ORDER — ENOXAPARIN SODIUM 100 MG/ML
40 INJECTION SUBCUTANEOUS DAILY
Status: DISCONTINUED | OUTPATIENT
Start: 2024-01-07 | End: 2024-01-08 | Stop reason: HOSPADM

## 2024-01-07 RX ORDER — ONDANSETRON 4 MG/1
4 TABLET, ORALLY DISINTEGRATING ORAL EVERY 8 HOURS PRN
Status: DISCONTINUED | OUTPATIENT
Start: 2024-01-07 | End: 2024-01-08 | Stop reason: HOSPADM

## 2024-01-07 RX ORDER — SODIUM CHLORIDE 0.9 % (FLUSH) 0.9 %
5-40 SYRINGE (ML) INJECTION PRN
Status: DISCONTINUED | OUTPATIENT
Start: 2024-01-07 | End: 2024-01-08 | Stop reason: HOSPADM

## 2024-01-07 RX ORDER — POTASSIUM CHLORIDE 750 MG/1
20 TABLET, EXTENDED RELEASE ORAL DAILY
Status: DISCONTINUED | OUTPATIENT
Start: 2024-01-07 | End: 2024-01-08 | Stop reason: HOSPADM

## 2024-01-07 RX ORDER — AMLODIPINE BESYLATE 5 MG/1
5 TABLET ORAL DAILY
Status: DISCONTINUED | OUTPATIENT
Start: 2024-01-07 | End: 2024-01-07

## 2024-01-07 RX ORDER — ALBUTEROL SULFATE 90 UG/1
2 AEROSOL, METERED RESPIRATORY (INHALATION) EVERY 4 HOURS PRN
Status: DISCONTINUED | OUTPATIENT
Start: 2024-01-07 | End: 2024-01-08 | Stop reason: HOSPADM

## 2024-01-07 RX ORDER — FUROSEMIDE 10 MG/ML
20 INJECTION INTRAMUSCULAR; INTRAVENOUS 2 TIMES DAILY
Status: DISCONTINUED | OUTPATIENT
Start: 2024-01-07 | End: 2024-01-08 | Stop reason: HOSPADM

## 2024-01-07 RX ADMIN — SODIUM CHLORIDE, PRESERVATIVE FREE 10 ML: 5 INJECTION INTRAVENOUS at 08:50

## 2024-01-07 RX ADMIN — CARVEDILOL 25 MG: 12.5 TABLET, FILM COATED ORAL at 16:57

## 2024-01-07 RX ADMIN — FUROSEMIDE 20 MG: 10 INJECTION, SOLUTION INTRAMUSCULAR; INTRAVENOUS at 01:18

## 2024-01-07 RX ADMIN — FUROSEMIDE 20 MG: 10 INJECTION, SOLUTION INTRAMUSCULAR; INTRAVENOUS at 08:46

## 2024-01-07 RX ADMIN — COLCHICINE 0.6 MG: 0.6 CAPSULE ORAL at 08:46

## 2024-01-07 RX ADMIN — SODIUM CHLORIDE, PRESERVATIVE FREE 10 ML: 5 INJECTION INTRAVENOUS at 20:55

## 2024-01-07 RX ADMIN — FUROSEMIDE 20 MG: 10 INJECTION, SOLUTION INTRAMUSCULAR; INTRAVENOUS at 16:57

## 2024-01-07 RX ADMIN — SPIRONOLACTONE 12.5 MG: 25 TABLET ORAL at 08:46

## 2024-01-07 RX ADMIN — SACUBITRIL AND VALSARTAN 1 TABLET: 24; 26 TABLET, FILM COATED ORAL at 20:55

## 2024-01-07 RX ADMIN — PANTOPRAZOLE SODIUM 40 MG: 40 TABLET, DELAYED RELEASE ORAL at 05:54

## 2024-01-07 RX ADMIN — SACUBITRIL AND VALSARTAN 1 TABLET: 24; 26 TABLET, FILM COATED ORAL at 08:46

## 2024-01-07 RX ADMIN — MOMETASONE FUROATE AND FORMOTEROL FUMARATE DIHYDRATE 2 PUFF: 200; 5 AEROSOL RESPIRATORY (INHALATION) at 07:51

## 2024-01-07 RX ADMIN — CARVEDILOL 25 MG: 12.5 TABLET, FILM COATED ORAL at 08:46

## 2024-01-07 RX ADMIN — MOMETASONE FUROATE AND FORMOTEROL FUMARATE DIHYDRATE 2 PUFF: 200; 5 AEROSOL RESPIRATORY (INHALATION) at 20:36

## 2024-01-07 RX ADMIN — ALPRAZOLAM 0.25 MG: 0.25 TABLET ORAL at 20:52

## 2024-01-07 RX ADMIN — POTASSIUM CHLORIDE 20 MEQ: 750 TABLET, EXTENDED RELEASE ORAL at 08:46

## 2024-01-07 RX ADMIN — GABAPENTIN 100 MG: 100 CAPSULE ORAL at 20:55

## 2024-01-07 RX ADMIN — ENOXAPARIN SODIUM 40 MG: 100 INJECTION SUBCUTANEOUS at 08:46

## 2024-01-07 ASSESSMENT — PAIN SCALES - GENERAL
PAINLEVEL_OUTOF10: 0
PAINLEVEL_OUTOF10: 4
PAINLEVEL_OUTOF10: 0
PAINLEVEL_OUTOF10: 0

## 2024-01-07 ASSESSMENT — PAIN DESCRIPTION - DESCRIPTORS: DESCRIPTORS: ACHING

## 2024-01-07 ASSESSMENT — PAIN DESCRIPTION - LOCATION: LOCATION: ABDOMEN

## 2024-01-07 NOTE — ED PROVIDER NOTES
[facility] EMERGENCY DEPT  EMERGENCY DEPARTMENT ENCOUNTER       Pt Name: Marly Maravilla  MRN: [id number]  Birthdate 1940  Date of evaluation: 1/6/2024  Provider: Sheri Heath MD   PCP: Nabor Delacruz MD  Note Started: 1:23 AM 1/7/24     CHIEF COMPLAINT       Chief Complaint   Patient presents with    Shortness of Breath     For 2 hours        History of present illness      History From: Patient  History limited by: Nothing     Marly Maravilla is a 83 y.o. female who presents to the ED, brought in by family, patient complaining of shortness of breath beginning 2 hours prior to ED arrival.  Patient well-known to ED for multiple admissions with CHF and COPD exacerbations.  She is not on oxygen at home.  She has no CHF with last echo with EF 20 to 25%.  Patient has refused AICD in the past.  She has also been admitted a few times with non-STEMI and seen by cardiology with type II non-STEMI.       Nursing Notes were all reviewed and agreed with or any disagreements were addressed in the HPI.     REVIEW OF SYSTEMS      Review of Systems     Positives and Pertinent negatives as per HPI.    PAST HISTORY     Past Medical History:  Past Medical History:   Diagnosis Date    Allergic rhinitis     Arthritis     knee - pending surg    Asthma     CHF (congestive heart failure) (HCC)     Chronic obstructive pulmonary disease (HCC)     mild per pulmonary notes    Diverticulitis large intestine     GERD (gastroesophageal reflux disease)     Hypertension     Thromboembolus (HCC)     many years ago    Thyroid disease     cyst on thyroid       Past Surgical History:  Past Surgical History:   Procedure Laterality Date    APPENDECTOMY  age 20    COLONOSCOPY N/A 4/3/2023    COLONOSCOPY performed by Chano Middleton MD at [facility] ENDOSCOPY    HYSTERECTOMY (CERVIX STATUS UNKNOWN)  age 41    DC UNLISTED PROCEDURE ABDOMEN PERITONEUM & OMENTUM Left age 20    inguinal    TOTAL KNEE ARTHROPLASTY Left 2011    Dr. Vela       Family History:  History

## 2024-01-07 NOTE — CARE COORDINATION
care/goals and shares the quality data associated with the providers was provided to:     Patient Representative Name:       The Patient and/or Patient Representative Agree with the Discharge Plan?      Shani Ortiz  Case Management Department  Ph: [phone] Fax:

## 2024-01-07 NOTE — PROGRESS NOTES
Shift report for 7pm to 7am    1845- Change in care report received from Kyung. Patient was resting in bed with eyes open, responded to verbal stimuli appropriately. Patient was alert and oriented x4 with no confusion noted. Patient repositions self in bed as needed. Patient was up to bedside commode independently and then back to bed. Patient had small formed bowel movement. Call bell is within reach.     1914- Patient was assessed, vitals taken.     2052- Patient was given evening medications with sips of water. Patient was given xanax per patient request to assist with relaxation/sleep.     2230- Rounding completed, patient appeared to be sleeping . NAD noted.     2358- Patient was reassessed for changes, none noted.     0210- Rounding completed, patient was sleeping but responded to verbal stimuli appropriately. Patient was repositioned in bed for comfort. Call bell is within reach.     0405- Patient was reassessed for changes, none noted.     0530- Rounding completed, patient was awake and denied pain at this time. Patient requested water and crackers which were given. Primafit was draining dark yellow urine, emptied.     0645- Change in shift report given to Kyung.

## 2024-01-07 NOTE — H&P
HISTORY & PHYSICAL  [place]         NAME: Marly Maravilla   :  1940   MRN:  [id number]     Date/Time:  2024 2:09 AM    Patient PCP: Nabor Delacruz MD       Subjective:   CHIEF COMPLAINT: SOB    HISTORY OF PRESENT ILLNESS:     Marly Maravilla is a 83 y.o.   female who presented to the ED earlier this evening for c/o SOB x2 hours. The patient is well known to us and has had several admissions over the last few months for acute CHF exacerbations, NSTEMI and GI Bleeding. The patient reports that she was laying in bed when she began to have SOB. She reports that there was also pressure on her chest. She denies chest pain, dizziness, numbness/tingling, abdominal pain, fever/chills, N/V/D/C, urinary symptoms or recent sick contacts. Spoke with the ED physician regarding patient condition and workup. In the ED the patient had labs significant for a BNP of 16,165, Troponin of 143 -> 141, H/H of 10.4/34.4, ABG: pH 7.43, CO2 30, pO2 70, HCO3 20. CXR consistent with moderate pulmonary edema. EKG ST with PVCs and nonspecific T wave abnormality. Recent echo from  revealed severely reduced left ventricular systolic function with an EF of 26%. Severe global hypokinesis, Grade II diastolic dysfunction. Treatment in the ED consisted of 20mg IV lasix x1 initially with a repeat dose of 20mg, Duonebs and 80mg of IV Solu-medrol.     The patient is being brought in for acute on chronic CHF exacerbation. Her expected length of stay will be at least 24 - 48 hours.     Past Medical History:   Diagnosis Date    Allergic rhinitis     Arthritis     knee - pending surg    Asthma     CHF (congestive heart failure) (HCC)     Chronic obstructive pulmonary disease (HCC)     mild per pulmonary notes    Diverticulitis large intestine     GERD (gastroesophageal reflux disease)     GI bleed 2022    Hypertension     Thromboembolus (HCC)     many years

## 2024-01-07 NOTE — PLAN OF CARE
Problem: Safety - Adult  Goal: Free from fall injury  1/7/2024 0939 by Kyung Erazo RN  Outcome: Progressing  1/7/2024 0257 by Sandee Sarabia RN  Outcome: Progressing     Problem: Discharge Planning  Goal: Discharge to home or other facility with appropriate resources  1/7/2024 0939 by Kyung Erazo RN  Outcome: Progressing  1/7/2024 0257 by Sandee Sarabia RN  Outcome: Progressing     Problem: ABCDS Injury Assessment  Goal: Absence of physical injury  1/7/2024 0939 by Kyung Erazo RN  Outcome: Progressing  1/7/2024 0257 by Sandee Sarabia RN  Outcome: Progressing     Problem: Skin/Tissue Integrity  Goal: Absence of new skin breakdown  Description: 1.  Monitor for areas of redness and/or skin breakdown  2.  Assess vascular access sites hourly  3.  Every 4-6 hours minimum:  Change oxygen saturation probe site  4.  Every 4-6 hours:  If on nasal continuous positive airway pressure, respiratory therapy assess nares and determine need for appliance change or resting period.  1/7/2024 0939 by Kyung Erazo RN  Outcome: Progressing  1/7/2024 0257 by Sandee Sarabia RN  Outcome: Progressing     Problem: Chronic Conditions and Co-morbidities  Goal: Patient's chronic conditions and co-morbidity symptoms are monitored and maintained or improved  Outcome: Progressing     Problem: Pain  Goal: Verbalizes/displays adequate comfort level or baseline comfort level  Outcome: Progressing

## 2024-01-07 NOTE — PLAN OF CARE
Problem: Safety - Adult  Goal: Free from fall injury  Outcome: Progressing     Problem: Discharge Planning  Goal: Discharge to home or other facility with appropriate resources  Outcome: Progressing     Problem: ABCDS Injury Assessment  Goal: Absence of physical injury  Outcome: Progressing     Problem: Skin/Tissue Integrity  Goal: Absence of new skin breakdown  Description: 1.  Monitor for areas of redness and/or skin breakdown  2.  Assess vascular access sites hourly  3.  Every 4-6 hours minimum:  Change oxygen saturation probe site  4.  Every 4-6 hours:  If on nasal continuous positive airway pressure, respiratory therapy assess nares and determine need for appliance change or resting period.  Outcome: Progressing

## 2024-01-07 NOTE — PROGRESS NOTES
Shift report for 7pm to 7am    0250- Patient was received via stretcher from the ER. Patient was transferred from stretcher to bed with two person assistance. Patient positioned herself in bed with minimal assistance. Patient denied pain at this time. Patient had mild dyspnea with talking and walking, recovered quickly after settling in. Patient repositions self in bed. Purewick is in place draining clear yellow urine. Call bell is within reach.     8877- Rounding completed, patient was sleeping and showed no signs of pain or distress.     7159- Change in shift report given to Kyung.

## 2024-01-07 NOTE — PROGRESS NOTES
0700- Assumed care of the patient from off going nurse via bedside shift report. Patient resting in bed, equal unlabored respirations, easy to wake.     Assessment complete. No needs voiced at this time. CBWR    Patient up to bedside commode to defecate.     0846- administered scheduled medications    Patient up to bedside commode to defecate.     1130- lunch provided    Patient up to bedside commode to defecate.    Family at bedside    Patient up to bedside commode to defecate.    1630- VSS- patient sitting up in bed eating dinner    1745- Rounded on patient- no needs voiced at this time. CBWR    1900- Report given to on coming nurse- JOVITA lyles RN

## 2024-01-07 NOTE — ED NOTES
TRANSFER - OUT REPORT:    Verbal report given to LOPEZ Ignacio on Marly Maravilla  being transferred to 242 for routine progression of patient care       Report consisted of patient's Situation, Background, Assessment and   Recommendations(SBAR).     Information from the following report(s) ED SBAR was reviewed with the receiving nurse.    [place] Fall Assessment:                           Lines:   Peripheral IV 01/06/24 Distal;Left;Anterior Cephalic (Active)        Opportunity for questions and clarification was provided.      Patient transported with:  Monitor

## 2024-01-07 NOTE — ED TRIAGE NOTES
Pt reports SOB for 2 hours, reportedly took a breathing treatment at home with no change in status.

## 2024-01-08 VITALS
BODY MASS INDEX: 23.49 KG/M2 | OXYGEN SATURATION: 97 % | HEIGHT: 62 IN | RESPIRATION RATE: 18 BRPM | DIASTOLIC BLOOD PRESSURE: 69 MMHG | WEIGHT: 127.65 LBS | TEMPERATURE: 97.2 F | SYSTOLIC BLOOD PRESSURE: 109 MMHG | HEART RATE: 74 BPM

## 2024-01-08 LAB
ANION GAP SERPL CALC-SCNC: 6 MMOL/L (ref 3–18)
BASOPHILS # BLD: 0 K/UL (ref 0–0.1)
BASOPHILS NFR BLD: 0 % (ref 0–2)
BNP SERPL-MCNC: ABNORMAL PG/ML (ref 0–1800)
BUN SERPL-MCNC: 18 MG/DL (ref 7–18)
BUN/CREAT SERPL: 16 (ref 12–20)
CA-I BLD-MCNC: 8.4 MG/DL (ref 8.5–10.1)
CHLORIDE SERPL-SCNC: 108 MMOL/L (ref 100–111)
CO2 SERPL-SCNC: 29 MMOL/L (ref 21–32)
CREAT SERPL-MCNC: 1.1 MG/DL (ref 0.6–1.3)
DIFFERENTIAL METHOD BLD: ABNORMAL
EOSINOPHIL # BLD: 0.1 K/UL (ref 0–0.4)
EOSINOPHIL NFR BLD: 1 % (ref 0–5)
ERYTHROCYTE [DISTWIDTH] IN BLOOD BY AUTOMATED COUNT: 20.5 % (ref 11.6–14.5)
GLUCOSE SERPL-MCNC: 103 MG/DL (ref 74–99)
HCT VFR BLD AUTO: 28.2 % (ref 35–45)
HGB BLD-MCNC: 8.6 G/DL (ref 12–16)
IMM GRANULOCYTES # BLD AUTO: 0 K/UL (ref 0–0.04)
IMM GRANULOCYTES NFR BLD AUTO: 0 % (ref 0–0.5)
LYMPHOCYTES # BLD: 2.6 K/UL (ref 0.9–3.6)
LYMPHOCYTES NFR BLD: 28 % (ref 21–52)
MCH RBC QN AUTO: 25.6 PG (ref 24–34)
MCHC RBC AUTO-ENTMCNC: 30.5 G/DL (ref 31–37)
MCV RBC AUTO: 83.9 FL (ref 78–100)
MONOCYTES # BLD: 1.1 K/UL (ref 0.05–1.2)
MONOCYTES NFR BLD: 12 % (ref 3–10)
NEUTS SEG # BLD: 5.6 K/UL (ref 1.8–8)
NEUTS SEG NFR BLD: 59 % (ref 40–73)
NRBC # BLD: 0 K/UL (ref 0–0.01)
NRBC BLD-RTO: 0 PER 100 WBC
PLATELET # BLD AUTO: 356 K/UL (ref 135–420)
PMV BLD AUTO: 10.2 FL (ref 9.2–11.8)
POTASSIUM SERPL-SCNC: 3.6 MMOL/L (ref 3.5–5.5)
RBC # BLD AUTO: 3.36 M/UL (ref 4.2–5.3)
SODIUM SERPL-SCNC: 143 MMOL/L (ref 136–145)
WBC # BLD AUTO: 9.5 K/UL (ref 4.6–13.2)

## 2024-01-08 PROCEDURE — 6370000000 HC RX 637 (ALT 250 FOR IP): Performed by: NURSE PRACTITIONER

## 2024-01-08 PROCEDURE — 2580000003 HC RX 258: Performed by: NURSE PRACTITIONER

## 2024-01-08 PROCEDURE — 36415 COLL VENOUS BLD VENIPUNCTURE: CPT

## 2024-01-08 PROCEDURE — 6370000000 HC RX 637 (ALT 250 FOR IP): Performed by: INTERNAL MEDICINE

## 2024-01-08 PROCEDURE — 85025 COMPLETE CBC W/AUTO DIFF WBC: CPT

## 2024-01-08 PROCEDURE — 83880 ASSAY OF NATRIURETIC PEPTIDE: CPT

## 2024-01-08 PROCEDURE — 94761 N-INVAS EAR/PLS OXIMETRY MLT: CPT

## 2024-01-08 PROCEDURE — 80048 BASIC METABOLIC PNL TOTAL CA: CPT

## 2024-01-08 PROCEDURE — 2700000000 HC OXYGEN THERAPY PER DAY

## 2024-01-08 PROCEDURE — 6360000002 HC RX W HCPCS: Performed by: NURSE PRACTITIONER

## 2024-01-08 PROCEDURE — 94640 AIRWAY INHALATION TREATMENT: CPT

## 2024-01-08 RX ORDER — FERROUS SULFATE 325(65) MG
325 TABLET ORAL EVERY OTHER DAY
Status: DISCONTINUED | OUTPATIENT
Start: 2024-01-08 | End: 2024-01-08 | Stop reason: HOSPADM

## 2024-01-08 RX ORDER — FUROSEMIDE 20 MG/1
20 TABLET ORAL 2 TIMES DAILY
Qty: 60 TABLET | Refills: 4 | Status: SHIPPED | OUTPATIENT
Start: 2024-01-08

## 2024-01-08 RX ADMIN — SODIUM CHLORIDE, PRESERVATIVE FREE 10 ML: 5 INJECTION INTRAVENOUS at 08:34

## 2024-01-08 RX ADMIN — PANTOPRAZOLE SODIUM 40 MG: 40 TABLET, DELAYED RELEASE ORAL at 06:18

## 2024-01-08 RX ADMIN — CARVEDILOL 25 MG: 12.5 TABLET, FILM COATED ORAL at 08:27

## 2024-01-08 RX ADMIN — SPIRONOLACTONE 12.5 MG: 25 TABLET ORAL at 08:28

## 2024-01-08 RX ADMIN — ENOXAPARIN SODIUM 40 MG: 100 INJECTION SUBCUTANEOUS at 08:28

## 2024-01-08 RX ADMIN — MOMETASONE FUROATE AND FORMOTEROL FUMARATE DIHYDRATE 2 PUFF: 200; 5 AEROSOL RESPIRATORY (INHALATION) at 07:15

## 2024-01-08 RX ADMIN — FERROUS SULFATE TAB 325 MG (65 MG ELEMENTAL FE) 325 MG: 325 (65 FE) TAB at 08:42

## 2024-01-08 RX ADMIN — COLCHICINE 0.6 MG: 0.6 CAPSULE ORAL at 08:28

## 2024-01-08 RX ADMIN — SACUBITRIL AND VALSARTAN 1 TABLET: 24; 26 TABLET, FILM COATED ORAL at 08:28

## 2024-01-08 RX ADMIN — FUROSEMIDE 20 MG: 10 INJECTION, SOLUTION INTRAMUSCULAR; INTRAVENOUS at 08:28

## 2024-01-08 RX ADMIN — POTASSIUM CHLORIDE 20 MEQ: 750 TABLET, EXTENDED RELEASE ORAL at 08:28

## 2024-01-08 ASSESSMENT — PAIN SCALES - GENERAL
PAINLEVEL_OUTOF10: 0
PAINLEVEL_OUTOF10: 0

## 2024-01-08 NOTE — CONSULTS
chronic .   -Previous echo (12/23) Trivial pericardial effusion present.  -Continue PO furosemide as above       Paroxysmal Atrial fibrillation: NSR on EKG and tele this am.   -CHADsVASC=5 (age, female, CHF, HTN)  -Continue coreg   -She is not a good candidate for OAC given her significant history of GI bleeding and concerns with her risk for falls at home. Consider OP evaluation for watchmen.     CAD: CP free.   -S/p LHC (2017) w/ normal coronaries.   -NST (2020) abnormal.High risk. Findings consistent w/ high-grade occlusion to proximal LAD or prior anterior wall infarction w/ burke-infarction ischemia.  -Continue HI statin  -Patient declines any invasive procedure and wishes to conservatively manage with medication and will discuss with primary cardiologist Dr. Almonte.       Hypertension: BP at goal today.   -Continue Coreg and losartan     Atrial septal defect:   -Followed by [facility] cardiology     COPD: Former smoker  -Managed by primary  -Continue Duo nebs PRN   -RT following       Thank you for involving us in the care of this patient.  Please do not hesitate to call me or Dr. Ochoa if additional questions arise.      Taty Avila, APRN - CNP  1/8/2024

## 2024-01-08 NOTE — PLAN OF CARE
Problem: Safety - Adult  Goal: Free from fall injury  1/8/2024 1155 by Kyung Erazo RN  Outcome: Adequate for Discharge  1/8/2024 1155 by Kyung Erazo RN  Outcome: Adequate for Discharge  1/8/2024 0720 by Kyung Erazo RN  Outcome: Progressing     Problem: Discharge Planning  Goal: Discharge to home or other facility with appropriate resources  1/8/2024 1155 by Kyung Erazo RN  Outcome: Adequate for Discharge  1/8/2024 1155 by Kyung Erazo RN  Outcome: Adequate for Discharge  1/8/2024 0720 by Kyung Erazo RN  Outcome: Progressing     Problem: ABCDS Injury Assessment  Goal: Absence of physical injury  1/8/2024 1155 by Kyung Erazo RN  Outcome: Adequate for Discharge  1/8/2024 1155 by Kyung Erazo RN  Outcome: Adequate for Discharge  1/8/2024 0720 by Kyung Erazo RN  Outcome: Progressing     Problem: Skin/Tissue Integrity  Goal: Absence of new skin breakdown  Description: 1.  Monitor for areas of redness and/or skin breakdown  2.  Assess vascular access sites hourly  3.  Every 4-6 hours minimum:  Change oxygen saturation probe site  4.  Every 4-6 hours:  If on nasal continuous positive airway pressure, respiratory therapy assess nares and determine need for appliance change or resting period.  1/8/2024 1155 by Kyung Erazo RN  Outcome: Adequate for Discharge  1/8/2024 0720 by Kyung Erazo RN  Outcome: Progressing     Problem: Chronic Conditions and Co-morbidities  Goal: Patient's chronic conditions and co-morbidity symptoms are monitored and maintained or improved  1/8/2024 1155 by Kyung Erazo RN  Outcome: Adequate for Discharge  1/8/2024 0720 by Kyung Erazo RN  Outcome: Progressing     Problem: Pain  Goal: Verbalizes/displays adequate comfort level or baseline comfort level  1/8/2024 1155 by Kyung Erazo RN  Outcome: Adequate for Discharge  1/8/2024 0720 by Kyung Erazo RN  Outcome: Progressing

## 2024-01-08 NOTE — PLAN OF CARE
Problem: Safety - Adult  Goal: Free from fall injury  Outcome: Progressing     Problem: Discharge Planning  Goal: Discharge to home or other facility with appropriate resources  Outcome: Progressing     Problem: ABCDS Injury Assessment  Goal: Absence of physical injury  Outcome: Progressing     Problem: Skin/Tissue Integrity  Goal: Absence of new skin breakdown  Description: 1.  Monitor for areas of redness and/or skin breakdown  2.  Assess vascular access sites hourly  3.  Every 4-6 hours minimum:  Change oxygen saturation probe site  4.  Every 4-6 hours:  If on nasal continuous positive airway pressure, respiratory therapy assess nares and determine need for appliance change or resting period.  Outcome: Progressing     Problem: Chronic Conditions and Co-morbidities  Goal: Patient's chronic conditions and co-morbidity symptoms are monitored and maintained or improved  Outcome: Progressing     Problem: Pain  Goal: Verbalizes/displays adequate comfort level or baseline comfort level  Outcome: Progressing

## 2024-01-08 NOTE — PROGRESS NOTES
0700- Assumed care of the patient from off going nurse via bedside shift report. Patient resting in bed, equal unlabored respirations, easy to wake. 1L NC present. IV saline locked. Bed alarm on, BSC in room. CBWR    Assessment complete. No needs voiced at this time. CBWR    0832- Administered scheduled medications- called pharmacy regarding iron 324mg- order corrected and administered    Family at bedside    1145- PCP appointment scheduled- per cardiology note- patient Previously declined invasive procures including AICD or cardiac catheterization. She is willing to reconsider and wants to discuss with primary cardiologist at her next appointment in March with Dr. Almonte.     DC ORDER RECEIVED    IV REMOVED  DC INSTRUCTIONS REVIEWED WITH PATIENT  All items given to patient    1230- Patient transported off of unit via wheelchair by MARIKA Rios

## 2024-01-08 NOTE — DISCHARGE SUMMARY
Discharge Summary       Patient ID:  Marly Maravilla,   83 y.o., female  1940    PCP:  Nabor Delacruz MD    Admit Date: 1/6/2024 10:30 PM  Discharge Date:  No discharge date for patient encounter.  Length of stay: 1 day(s)  Code Status: Full Code  Discharging physician: Noe Patel MD    Admission Diagnoses:   COPD exacerbation ([facility]) [J44.1]  Acute on chronic combined systolic and diastolic CHF (congestive heart failure) ([facility]) [I50.43]    Discharge Medications     Medication List        CHANGE how you take these medications      furosemide 20 MG tablet  Commonly known as: Lasix  Take 1 tablet by mouth 2 times daily  What changed: when to take this            CONTINUE taking these medications      acetaminophen 325 MG tablet  Commonly known as: TYLENOL     ALPRAZolam 0.25 MG tablet  Commonly known as: XANAX     aspirin 81 MG chewable tablet  Take 2 tablets by mouth once for 1 dose     carboxymethylcellulose 1 % ophthalmic solution     carvedilol 25 MG tablet  Commonly known as: COREG     colchicine 0.6 MG tablet  Commonly known as: COLCRYS     Entresto 24-26 MG per tablet  Generic drug: sacubitril-valsartan     Ferrous Fumarate 324 (106 Fe) MG Tabs     fluticasone-salmeterol 250-50 MCG/ACT Aepb diskus inhaler  Commonly known as: ADVAIR     gabapentin 100 MG capsule  Commonly known as: NEURONTIN     pantoprazole 40 MG tablet  Commonly known as: PROTONIX     potassium chloride 20 MEQ packet  Commonly known as: KLOR-CON     spironolactone 25 MG tablet  Commonly known as: ALDACTONE     traMADol 50 MG tablet  Commonly known as: ULTRAM     Ventolin  (90 Base) MCG/ACT inhaler  Generic drug: albuterol sulfate HFA            STOP taking these medications      amLODIPine 5 MG tablet  Commonly known as: NORVASC               Where to Get Your Medications        These [facility] - [place], [place] [facility] [address] [facility] [place] - P [phone] - F [phone]  [address] [place] [address],

## 2024-02-18 ENCOUNTER — HOSPITAL ENCOUNTER (EMERGENCY)
Age: 84
Discharge: HOME | End: 2024-02-18
Attending: EMERGENCY MEDICINE
Payer: MEDICARE

## 2024-02-18 VITALS
HEIGHT: 62 IN | OXYGEN SATURATION: 99 % | BODY MASS INDEX: 22.45 KG/M2 | SYSTOLIC BLOOD PRESSURE: 146 MMHG | TEMPERATURE: 98 F | DIASTOLIC BLOOD PRESSURE: 74 MMHG | HEART RATE: 81 BPM | RESPIRATION RATE: 18 BRPM | WEIGHT: 122 LBS

## 2024-02-18 DIAGNOSIS — S16.1XXA STRAIN OF STERNOCLEIDOMASTOID MUSCLE, INITIAL ENCOUNTER: Primary | ICD-10-CM

## 2024-02-18 PROCEDURE — 99283 EMERGENCY DEPT VISIT LOW MDM: CPT

## 2024-02-18 RX ORDER — CYCLOBENZAPRINE HCL 5 MG
5 TABLET ORAL 2 TIMES DAILY PRN
Qty: 5 TABLET | Refills: 0 | Status: SHIPPED | OUTPATIENT
Start: 2024-02-18

## 2024-02-18 ASSESSMENT — PAIN DESCRIPTION - ORIENTATION: ORIENTATION: RIGHT

## 2024-02-18 ASSESSMENT — PAIN DESCRIPTION - DESCRIPTORS: DESCRIPTORS: ACHING

## 2024-02-18 ASSESSMENT — PAIN SCALES - GENERAL: PAINLEVEL_OUTOF10: 8

## 2024-02-18 ASSESSMENT — PAIN DESCRIPTION - LOCATION: LOCATION: NECK;EAR

## 2024-02-18 ASSESSMENT — PAIN - FUNCTIONAL ASSESSMENT: PAIN_FUNCTIONAL_ASSESSMENT: 0-10

## 2024-02-18 NOTE — ED PROVIDER NOTES
[facility] EMERGENCY DEPT  EMERGENCY DEPARTMENT ENCOUNTER      Pt Name: Marly Maravilla  MRN: [id number]  Birthdate 1940  Date of evaluation: 2/18/2024  Provider: Ab Esquivel MD  10:09 AM    CHIEF COMPLAINT       Chief Complaint   Patient presents with    Neck Pain         HISTORY OF PRESENT ILLNESS    Marly Maravilla is a 83 y.o. female with past medical history significant for asthma, CHF, COPD, hypertension who presents to the emergency department for evaluation of right neck pain for the last 2 days.  Gradual onset, atraumatic nonradiating.  Aggravated by palpation and rotation.  No change to the character or severity.    The history is provided by the patient, a relative and medical records.       Nursing Notes were reviewed.    REVIEW OF SYSTEMS       Review of Systems   All other systems reviewed and are negative.      Except as noted above the remainder of the review of systems was reviewed and negative.       PAST MEDICAL HISTORY     Past Medical History:   Diagnosis Date    Allergic rhinitis     Arthritis     knee - pending surg    Asthma     CHF (congestive heart failure) (HCC)     Chronic obstructive pulmonary disease (HCC)     mild per pulmonary notes    Diverticulitis large intestine     GERD (gastroesophageal reflux disease)     GI bleed 11/09/2022    Hypertension     Thromboembolus (HCC)     many years ago    Thyroid disease     cyst on thyroid         SURGICAL HISTORY       Past Surgical History:   Procedure Laterality Date    APPENDECTOMY  age 20    COLONOSCOPY N/A 4/3/2023    COLONOSCOPY performed by Chano Middleton MD at [facility] ENDOSCOPY    HYSTERECTOMY (CERVIX STATUS UNKNOWN)  age 41    ME UNLISTED PROCEDURE ABDOMEN PERITONEUM & OMENTUM Left age 20    inguinal    TOTAL KNEE ARTHROPLASTY Left 2011    Dr. Vela         CURRENT MEDICATIONS       Previous Medications    ACETAMINOPHEN (TYLENOL) 325 MG TABLET    Take 1 tablet by mouth every 4 hours as needed for Pain    ALBUTEROL SULFATE HFA (VENTOLIN HFA) 108 (90

## 2024-02-18 NOTE — ED TRIAGE NOTES
Patient with right sided neck pain x 2 days, states right ear pain, felt like she had swelling in her lymph node but that has went away. Tylenol for pain, no relief

## 2024-02-18 NOTE — DISCHARGE INSTRUCTIONS
You have a drain in the muscle of your right neck, also noticed the sternocleidomastoid muscle.  He you may treat with heat pack, cold pack, topical agents such as over-the-counter lidocaine patch, Biofreeze, Tiger balm, Voltaren gel.  I have prescribed a few doses of muscle relaxer to be taken at nighttime.  I recommend follow-up with your primary care provider for reassessment.

## 2024-04-03 ENCOUNTER — HOSPITAL ENCOUNTER (OUTPATIENT)
Age: 84
Discharge: HOME | End: 2024-04-06
Payer: MEDICARE

## 2024-04-03 DIAGNOSIS — E04.1 NONTOXIC SINGLE THYROID NODULE: ICD-10-CM

## 2024-04-03 DIAGNOSIS — R13.14 DYSPHAGIA, PHARYNGOESOPHAGEAL PHASE: ICD-10-CM

## 2024-04-03 PROCEDURE — 76536 US EXAM OF HEAD AND NECK: CPT

## 2024-04-03 PROCEDURE — 2500000003 HC RX 250 WO HCPCS: Performed by: PHYSICIAN ASSISTANT

## 2024-04-03 PROCEDURE — 74220 X-RAY XM ESOPHAGUS 1CNTRST: CPT

## 2024-04-03 PROCEDURE — 6370000000 HC RX 637 (ALT 250 FOR IP): Performed by: PHYSICIAN ASSISTANT

## 2024-04-03 RX ADMIN — ANTACID/ANTIFLATULENT 1 EACH: 380; 550; 10; 10 GRANULE, EFFERVESCENT ORAL at 11:11

## 2024-04-03 RX ADMIN — BARIUM SULFATE 1 TABLET: 700 TABLET ORAL at 11:11

## 2024-04-03 RX ADMIN — BARIUM SULFATE 140 ML: 980 POWDER, FOR SUSPENSION ORAL at 11:11

## 2024-04-03 RX ADMIN — BARIUM SULFATE 355 ML: 0.6 SUSPENSION ORAL at 11:11

## 2024-06-04 DIAGNOSIS — M25.531 RIGHT WRIST PAIN: ICD-10-CM

## 2024-06-04 DIAGNOSIS — M25.562 LEFT KNEE PAIN, UNSPECIFIED CHRONICITY: Primary | ICD-10-CM

## 2024-06-06 ENCOUNTER — HOSPITAL ENCOUNTER (OUTPATIENT)
Age: 84
Discharge: HOME | End: 2024-06-06
Payer: MEDICARE

## 2024-06-06 DIAGNOSIS — M25.562 LEFT KNEE PAIN, UNSPECIFIED CHRONICITY: ICD-10-CM

## 2024-06-06 DIAGNOSIS — M25.531 RIGHT WRIST PAIN: ICD-10-CM

## 2024-06-06 PROCEDURE — 73110 X-RAY EXAM OF WRIST: CPT

## 2024-06-06 PROCEDURE — 73562 X-RAY EXAM OF KNEE 3: CPT

## 2024-06-13 ENCOUNTER — OFFICE VISIT (OUTPATIENT)
Age: 84
End: 2024-06-13
Payer: MEDICARE

## 2024-06-13 DIAGNOSIS — G89.29 CHRONIC PAIN OF LEFT KNEE: ICD-10-CM

## 2024-06-13 DIAGNOSIS — M25.532 LEFT WRIST PAIN: ICD-10-CM

## 2024-06-13 DIAGNOSIS — M25.531 RIGHT WRIST PAIN: ICD-10-CM

## 2024-06-13 DIAGNOSIS — M65.4 RADIAL STYLOID TENOSYNOVITIS: Primary | ICD-10-CM

## 2024-06-13 DIAGNOSIS — M25.562 CHRONIC PAIN OF LEFT KNEE: ICD-10-CM

## 2024-06-13 PROCEDURE — 1123F ACP DISCUSS/DSCN MKR DOCD: CPT | Performed by: ORTHOPAEDIC SURGERY

## 2024-06-13 PROCEDURE — 99214 OFFICE O/P EST MOD 30 MIN: CPT | Performed by: ORTHOPAEDIC SURGERY

## 2024-06-13 PROCEDURE — 20550 NJX 1 TENDON SHEATH/LIGAMENT: CPT | Performed by: ORTHOPAEDIC SURGERY

## 2024-06-13 RX ORDER — POTASSIUM CHLORIDE 20 MEQ/1
TABLET, EXTENDED RELEASE ORAL
COMMUNITY
Start: 2024-03-06

## 2024-06-13 RX ORDER — TRIAMCINOLONE ACETONIDE 40 MG/ML
40 INJECTION, SUSPENSION INTRA-ARTICULAR; INTRAMUSCULAR ONCE
Status: COMPLETED | OUTPATIENT
Start: 2024-06-13 | End: 2024-06-13

## 2024-06-13 RX ORDER — LIDOCAINE HYDROCHLORIDE 10 MG/ML
1 INJECTION, SOLUTION INFILTRATION; PERINEURAL ONCE
Status: COMPLETED | OUTPATIENT
Start: 2024-06-13 | End: 2024-06-13

## 2024-06-13 RX ORDER — FLUTICASONE PROPIONATE 50 MCG
SPRAY, SUSPENSION (ML) NASAL
COMMUNITY
Start: 2024-04-12

## 2024-06-13 RX ORDER — AZITHROMYCIN 250 MG/1
TABLET, FILM COATED ORAL
COMMUNITY
Start: 2024-04-16

## 2024-06-13 RX ORDER — PREGABALIN 75 MG/1
CAPSULE ORAL
COMMUNITY
Start: 2024-06-01

## 2024-06-13 RX ORDER — ONDANSETRON 4 MG/1
TABLET, FILM COATED ORAL
COMMUNITY
Start: 2024-06-01

## 2024-06-13 RX ORDER — FERROUS GLUCONATE 324(38)MG
TABLET ORAL
COMMUNITY
Start: 2024-03-06

## 2024-06-13 RX ORDER — FLUTICASONE FUROATE, UMECLIDINIUM BROMIDE AND VILANTEROL TRIFENATATE 100; 62.5; 25 UG/1; UG/1; UG/1
POWDER RESPIRATORY (INHALATION)
COMMUNITY
Start: 2024-06-01

## 2024-06-13 RX ORDER — IPRATROPIUM BROMIDE 42 UG/1
SPRAY, METERED NASAL
COMMUNITY
Start: 2024-04-22

## 2024-06-13 RX ADMIN — LIDOCAINE HYDROCHLORIDE 1 ML: 10 INJECTION, SOLUTION INFILTRATION; PERINEURAL at 11:48

## 2024-06-13 RX ADMIN — TRIAMCINOLONE ACETONIDE 40 MG: 40 INJECTION, SUSPENSION INTRA-ARTICULAR; INTRAMUSCULAR at 11:48

## 2024-06-13 NOTE — PROGRESS NOTES
inguinal    TOTAL KNEE ARTHROPLASTY Left 2011    Dr. Vela      Past Medical History:   Diagnosis Date    Allergic rhinitis     Arthritis     knee - pending surg    Asthma     CHF (congestive heart failure) (HCC)     Chronic obstructive pulmonary disease (HCC)     mild per pulmonary notes    Diverticulitis large intestine     GERD (gastroesophageal reflux disease)     GI bleed 11/09/2022    Hypertension     Thromboembolus (HCC)     many years ago    Thyroid disease     cyst on thyroid        I have reviewed and agree with PFSH and ROS and intake form in chart and the record furthermore I have reviewed prior medical record(s) regarding this patients care during this appointment.     Review of Systems:   Patient is a pleasant appearing individual, appropriately dressed, well hydrated, well nourished, who is alert, appropriately oriented for age, and in no acute distress with a normal gait and normal affect who does not appear to be in any significant pain.    Physical Exam:  Right hand- Point tenderness at the first dorsal compartment, Neurovascularly intact, No Instability, Positive Finklesteins Test, Good cap refill, No skin lesions, Full range of motion, Mild weakness, Moderate swelling dorsal wrist    Left hand- No Point Tenderness, Neurovascularly intact, No Instability, Good cap refill, No skin lesions, Full range of motion, No weakness, No swelling     Procedure Documentation:    I discussed in detail the risks, benefits and complications of an injection which included but are not limited to infection, skin reactions, hot swollen joint, and anaphylaxis with the patient. The patient verbalized understanding and gave informed consent for the injection. The patient's right wrist was prepped using sterile alcohol solution. A sterile needle was inserted into the right wrist and the mixture of 1 mL Lidocaine 1%, 1 mL Kenalog 40 mg was injected under sterile technique. The needle was withdrawn and the puncture site

## 2024-06-13 NOTE — PATIENT INSTRUCTIONS
De Quervain's Tenosynovitis: Care Instructions  Overview  De Quervain's (say \"Quinton\") tenosynovitis is a problem that makes the bottom of your thumb and the side of your wrist hurt. When you have de Quervain's, the ropey fiber (tendon) that helps move your thumb away from your fingers becomes swollen.  You may have pain when you move your wrist or pick things up. You may hear a creaking sound when you move your wrist or thumb.  Symptoms often get better in a few weeks with home care. Your doctor may want you to start some gentle stretching exercises once your symptoms are gone. Sometimes treatment with an injection or surgery is needed.  Follow-up care is a key part of your treatment and safety. Be sure to make and go to all appointments, and call your doctor if you are having problems. It's also a good idea to know your test results and keep a list of the medicines you take.  How can you care for yourself at home?  Until your symptoms are better, stop the activities that caused the pain.  Avoid moving the hand and wrist that hurt.  Follow your doctor's directions for wearing a splint to keep your thumb and wrist from moving.  Try ice or heat.  Put ice or a cold pack on your thumb and wrist for 10 to 20 minutes at a time. Put a thin cloth between the ice and your skin.  You can use heat for 20 to 30 minutes, 2 or 3 times a day. Try using a heating pad, hot shower, or hot pack.  Ask your doctor if you can take an over-the-counter pain medicine, such as acetaminophen (Tylenol), ibuprofen (Advil, Motrin), or naproxen (Aleve). Be safe with medicines. Read and follow all instructions on the label.  When should you call for help?  Watch closely for changes in your health, and be sure to contact your doctor if:    You have new or worse pain.     You have new or worse numbness or tingling in your hand or fingers.     Your hand feels weaker.     You do not get better as expected.   Where can you learn more?  Go to

## 2024-06-27 ENCOUNTER — HOSPITAL ENCOUNTER (EMERGENCY)
Age: 84
Discharge: HOME | End: 2024-06-27
Attending: EMERGENCY MEDICINE
Payer: MEDICARE

## 2024-06-27 ENCOUNTER — APPOINTMENT (OUTPATIENT)
Age: 84
End: 2024-06-27
Payer: MEDICARE

## 2024-06-27 DIAGNOSIS — R19.5 OCCULT GI BLEEDING: ICD-10-CM

## 2024-06-27 DIAGNOSIS — K57.32 DIVERTICULITIS OF COLON: Primary | ICD-10-CM

## 2024-06-27 DIAGNOSIS — M43.6 TORTICOLLIS: ICD-10-CM

## 2024-06-27 LAB
ALBUMIN SERPL-MCNC: 3.2 G/DL (ref 3.4–5)
ALBUMIN/GLOB SERPL: 0.6 (ref 0.8–1.7)
ALP SERPL-CCNC: 108 U/L (ref 45–117)
ALT SERPL-CCNC: 12 U/L (ref 13–56)
ANION GAP SERPL CALC-SCNC: 6 MMOL/L (ref 3–18)
AST SERPL W P-5'-P-CCNC: 10 U/L (ref 10–38)
BASOPHILS # BLD: 0.1 K/UL (ref 0–0.1)
BASOPHILS NFR BLD: 1 % (ref 0–2)
BILIRUB SERPL-MCNC: 0.4 MG/DL (ref 0.2–1)
BUN SERPL-MCNC: 17 MG/DL (ref 7–18)
BUN/CREAT SERPL: 14 (ref 12–20)
CA-I BLD-MCNC: 9.1 MG/DL (ref 8.5–10.1)
CHLORIDE SERPL-SCNC: 107 MMOL/L (ref 100–111)
CO2 SERPL-SCNC: 24 MMOL/L (ref 21–32)
COLLECT DATE STL: NORMAL
CREAT SERPL-MCNC: 1.25 MG/DL (ref 0.6–1.3)
DEPRECATED S PYO AG THROAT QL EIA: NEGATIVE
DIFFERENTIAL METHOD BLD: ABNORMAL
EOSINOPHIL # BLD: 0.4 K/UL (ref 0–0.4)
EOSINOPHIL NFR BLD: 4 % (ref 0–5)
ERYTHROCYTE [DISTWIDTH] IN BLOOD BY AUTOMATED COUNT: 15.8 % (ref 11.6–14.5)
FLUAV RNA SPEC QL NAA+PROBE: NOT DETECTED
FLUBV RNA SPEC QL NAA+PROBE: NOT DETECTED
GLOBULIN SER CALC-MCNC: 5.1 G/DL (ref 2–4)
GLUCOSE SERPL-MCNC: 104 MG/DL (ref 74–99)
HCT VFR BLD AUTO: 40.5 % (ref 35–45)
HEMOCCULT SP1 STL QL: POSITIVE
HGB BLD-MCNC: 12.9 G/DL (ref 12–16)
IMM GRANULOCYTES # BLD AUTO: 0 K/UL (ref 0–0.04)
IMM GRANULOCYTES NFR BLD AUTO: 0 % (ref 0–0.5)
LACTATE SERPL-SCNC: 1.1 MMOL/L (ref 0.4–2)
LIPASE SERPL-CCNC: 46 U/L (ref 13–75)
LYMPHOCYTES # BLD: 2.5 K/UL (ref 0.9–3.6)
LYMPHOCYTES NFR BLD: 25 % (ref 21–52)
MCH RBC QN AUTO: 29.5 PG (ref 24–34)
MCHC RBC AUTO-ENTMCNC: 31.9 G/DL (ref 31–37)
MCV RBC AUTO: 92.5 FL (ref 78–100)
MONOCYTES # BLD: 1.4 K/UL (ref 0.05–1.2)
MONOCYTES NFR BLD: 14 % (ref 3–10)
NEUTS SEG # BLD: 5.5 K/UL (ref 1.8–8)
NEUTS SEG NFR BLD: 56 % (ref 40–73)
NRBC # BLD: 0 K/UL (ref 0–0.01)
NRBC BLD-RTO: 0 PER 100 WBC
PLATELET # BLD AUTO: 286 K/UL (ref 135–420)
PMV BLD AUTO: 9.1 FL (ref 9.2–11.8)
POTASSIUM SERPL-SCNC: 4.8 MMOL/L (ref 3.5–5.5)
PROT SERPL-MCNC: 8.3 G/DL (ref 6.4–8.2)
RBC # BLD AUTO: 4.38 M/UL (ref 4.2–5.3)
SARS-COV-2 RNA RESP QL NAA+PROBE: NOT DETECTED
SODIUM SERPL-SCNC: 137 MMOL/L (ref 136–145)
WBC # BLD AUTO: 9.8 K/UL (ref 4.6–13.2)

## 2024-06-27 PROCEDURE — 87636 SARSCOV2 & INF A&B AMP PRB: CPT

## 2024-06-27 PROCEDURE — 82272 OCCULT BLD FECES 1-3 TESTS: CPT

## 2024-06-27 PROCEDURE — 99285 EMERGENCY DEPT VISIT HI MDM: CPT

## 2024-06-27 PROCEDURE — 80053 COMPREHEN METABOLIC PANEL: CPT

## 2024-06-27 PROCEDURE — 96374 THER/PROPH/DIAG INJ IV PUSH: CPT

## 2024-06-27 PROCEDURE — 85025 COMPLETE CBC W/AUTO DIFF WBC: CPT

## 2024-06-27 PROCEDURE — 87880 STREP A ASSAY W/OPTIC: CPT

## 2024-06-27 PROCEDURE — 74178 CT ABD&PLV WO CNTR FLWD CNTR: CPT

## 2024-06-27 PROCEDURE — 83605 ASSAY OF LACTIC ACID: CPT

## 2024-06-27 PROCEDURE — 6360000002 HC RX W HCPCS: Performed by: EMERGENCY MEDICINE

## 2024-06-27 PROCEDURE — 83690 ASSAY OF LIPASE: CPT

## 2024-06-27 PROCEDURE — 6370000000 HC RX 637 (ALT 250 FOR IP): Performed by: EMERGENCY MEDICINE

## 2024-06-27 PROCEDURE — 87070 CULTURE OTHR SPECIMN AEROBIC: CPT

## 2024-06-27 PROCEDURE — 6360000004 HC RX CONTRAST MEDICATION: Performed by: EMERGENCY MEDICINE

## 2024-06-27 RX ORDER — METRONIDAZOLE 250 MG/1
500 TABLET ORAL
Status: COMPLETED | OUTPATIENT
Start: 2024-06-27 | End: 2024-06-27

## 2024-06-27 RX ORDER — AMOXICILLIN AND CLAVULANATE POTASSIUM 875; 125 MG/1; MG/1
1 TABLET, FILM COATED ORAL 2 TIMES DAILY
Qty: 20 TABLET | Refills: 0 | Status: SHIPPED | OUTPATIENT
Start: 2024-06-27 | End: 2024-07-07

## 2024-06-27 RX ORDER — AMOXICILLIN AND CLAVULANATE POTASSIUM 875; 125 MG/1; MG/1
1 TABLET, FILM COATED ORAL
Status: COMPLETED | OUTPATIENT
Start: 2024-06-27 | End: 2024-06-27

## 2024-06-27 RX ORDER — CYCLOBENZAPRINE HCL 10 MG
10 TABLET ORAL
Status: COMPLETED | OUTPATIENT
Start: 2024-06-27 | End: 2024-06-27

## 2024-06-27 RX ORDER — HYDROCODONE BITARTRATE AND ACETAMINOPHEN 5; 325 MG/1; MG/1
1 TABLET ORAL EVERY 6 HOURS PRN
Qty: 12 TABLET | Refills: 0 | Status: SHIPPED | OUTPATIENT
Start: 2024-06-27 | End: 2024-06-30

## 2024-06-27 RX ORDER — KETOROLAC TROMETHAMINE 30 MG/ML
15 INJECTION, SOLUTION INTRAMUSCULAR; INTRAVENOUS
Status: COMPLETED | OUTPATIENT
Start: 2024-06-27 | End: 2024-06-27

## 2024-06-27 RX ORDER — HYDROCODONE BITARTRATE AND ACETAMINOPHEN 5; 325 MG/1; MG/1
1 TABLET ORAL
Status: COMPLETED | OUTPATIENT
Start: 2024-06-27 | End: 2024-06-27

## 2024-06-27 RX ORDER — METRONIDAZOLE 500 MG/1
500 TABLET ORAL 3 TIMES DAILY
Qty: 30 TABLET | Refills: 0 | Status: SHIPPED | OUTPATIENT
Start: 2024-06-27 | End: 2024-07-07

## 2024-06-27 RX ADMIN — HYDROCODONE BITARTRATE AND ACETAMINOPHEN 1 TABLET: 5; 325 TABLET ORAL at 20:53

## 2024-06-27 RX ADMIN — IOPAMIDOL 90 ML: 755 INJECTION, SOLUTION INTRAVENOUS at 17:34

## 2024-06-27 RX ADMIN — METRONIDAZOLE 500 MG: 250 TABLET ORAL at 20:53

## 2024-06-27 RX ADMIN — AMOXICILLIN AND CLAVULANATE POTASSIUM 1 TABLET: 875; 125 TABLET, FILM COATED ORAL at 20:53

## 2024-06-27 RX ADMIN — KETOROLAC TROMETHAMINE 15 MG: 30 INJECTION, SOLUTION INTRAMUSCULAR at 17:54

## 2024-06-27 RX ADMIN — CYCLOBENZAPRINE 10 MG: 10 TABLET, FILM COATED ORAL at 17:54

## 2024-06-27 ASSESSMENT — PAIN DESCRIPTION - LOCATION: LOCATION: THROAT;NECK

## 2024-06-27 ASSESSMENT — PAIN - FUNCTIONAL ASSESSMENT: PAIN_FUNCTIONAL_ASSESSMENT: 0-10

## 2024-06-27 ASSESSMENT — PAIN SCALES - GENERAL: PAINLEVEL_OUTOF10: 9

## 2024-06-27 ASSESSMENT — PAIN DESCRIPTION - ORIENTATION: ORIENTATION: POSTERIOR

## 2024-06-27 NOTE — ED TRIAGE NOTES
Pt complains of back of neck pain 9/10 and stiffness x 2 days. Decreased ROM in neck. Pt also complains of sore throat x 2 days.   Pt complains of bright red blood in stools x 1 week from complications of diverticulitis.

## 2024-06-27 NOTE — ED PROVIDER NOTES
refill  Neuro: Alert and appropriate, CN intact, normal speech, strength and sensation full and symmetric bilaterally, normal gait, normal coordination  Psychiatric: Normal mood and affect     DIAGNOSTIC RESULTS   LABS:     Labs Reviewed   COVID-19 & INFLUENZA COMBO   RAPID STREP SCREEN   OCCULT BLOOD, FECAL   LIPASE   LACTIC ACID   LACTIC ACID   CBC WITH AUTO DIFFERENTIAL   COMPREHENSIVE METABOLIC PANEL        EKG:   EKG interpretation: (Preliminary)  EKG read by Dr. Michael Heath       RADIOLOGY:  Non-plain film images such as CT, Ultrasound and MRI are read by the radiologist. Plain radiographic images are visualized and preliminarily interpreted by the ED Provider with the below findings:          Interpretation per the Radiologist below, if available at the time of this note:     CT ABDOMEN PELVIS W IV CONTRAST Additional Contrast? None    (Results Pending)        PROCEDURES   Unless otherwise noted below, none  Procedures     CRITICAL CARE TIME     EMERGENCY DEPARTMENT COURSE and DIFFERENTIAL DIAGNOSIS/MDM   Vitals:    Vitals:    06/27/24 1557   BP: (!) 168/89   Pulse: 85   Resp: 19   Temp: 98.4 °F (36.9 °C)   TempSrc: Oral   SpO2: 98%   Weight: 62.1 kg (137 lb)   Height: 1.6 m (5' 3\")        Patient was given the following medications:  Medications - No data to display    CONSULTS: (Who and What was discussed)  None    Chronic Conditions: As above    Social Determinants affecting Dx or Tx:  None    Records Reviewed (source and summary): Old medical records.  Nursing notes.  Previous radiology studies.      CC/HPI Summary, DDx, ED Course, and Reassessment:   Patient has a history of osteoarthritis of neck and presents with 2 days of neck pain and stiffness and sore throat.  She also has some rectal bleeding, reports history of diverticulitis finished antibiotics about a week or so ago and has been having rectal bleeding on and off since.    Exam remarkable for tenderness posterior neck with some paraspinal  up to 3 days. Intended supply: 3 days. Take lowest dose possible to manage pain Max Daily Amount: 4 tablets, Disp-12 tablet, R-0Normal                Details   azithromycin (ZITHROMAX) 250 MG tablet Historical Med      ferrous gluconate (FERGON) 324 (38 Fe) MG tablet Historical Med      fluticasone (FLONASE) 50 MCG/ACT nasal spray Historical Med      TRELEGY ELLIPTA 100-62.5-25 MCG/ACT AEPB inhaler DAWHistorical Med      ipratropium (ATROVENT) 0.06 % nasal spray Historical Med      ondansetron (ZOFRAN) 4 MG tablet Historical Med      pregabalin (LYRICA) 75 MG capsule Historical Med      potassium chloride (KLOR-CON M) 20 MEQ extended release tablet Historical Med      diclofenac sodium (VOLTAREN) 1 % GEL Apply 4 g topically 4 times daily as needed for Pain Apply 4 grams to affected area 4x a day FOR OA, Topical, 4 TIMES DAILY PRN Starting Thu 6/13/2024, Disp-200 g, R-8, Normal      cyclobenzaprine (FLEXERIL) 5 MG tablet Take 1 tablet by mouth 2 times daily as needed for Muscle spasms, Disp-5 tablet, R-0Normal      furosemide (LASIX) 20 MG tablet Take 1 tablet by mouth 2 times daily, Disp-60 tablet, R-4Normal      acetaminophen (TYLENOL) 325 MG tablet Take 1 tablet by mouth every 4 hours as needed for PainHistorical Med      Ferrous Fumarate 324 (106 Fe) MG TABS Take 1 tablet by mouth every other dayHistorical Med      albuterol sulfate HFA (VENTOLIN HFA) 108 (90 Base) MCG/ACT inhaler Inhale 2 puffs into the lungs every 4 hours as needed for WheezingHistorical Med      carboxymethylcellulose 1 % ophthalmic solution Place 1 drop into both eyes 3 times dailyHistorical Med      colchicine (COLCRYS) 0.6 MG tablet Take 1 tablet by mouth dailyHistorical Med      traMADol (ULTRAM) 50 MG tablet Take 1 tablet by mouth daily as needed for Pain. Max Daily Amount: 50 mgHistorical Med      sacubitril-valsartan (ENTRESTO) 24-26 MG per tablet Take 1 tablet by mouth 2 times dailyHistorical Med      gabapentin (NEURONTIN) 100 MG

## 2024-06-28 VITALS
SYSTOLIC BLOOD PRESSURE: 132 MMHG | RESPIRATION RATE: 16 BRPM | HEART RATE: 58 BPM | HEIGHT: 63 IN | OXYGEN SATURATION: 97 % | BODY MASS INDEX: 24.27 KG/M2 | DIASTOLIC BLOOD PRESSURE: 72 MMHG | TEMPERATURE: 98.4 F | WEIGHT: 137 LBS

## 2024-06-28 LAB
BACTERIA SPEC CULT: NORMAL
Lab: NORMAL

## 2024-06-28 NOTE — ED NOTES
I have reviewed discharge instructions with the patient and daughter.  The patient and daughter verbalized understanding.

## 2024-07-10 ENCOUNTER — TELEPHONE (OUTPATIENT)
Age: 84
End: 2024-07-10

## 2024-07-10 NOTE — TELEPHONE ENCOUNTER
----- Message from Sebastian De Jesus MD sent at 6/28/2024  7:22 PM EDT -----  Chart reviewed patient referred from emergency room for diverticulitis.  Recently diagnosed with diverticulitis and underwent treatment but was still having pain.  Repeat CT scan of the abdomen/pelvis with IV contrast alone showed mild sigmoid diverticulitis.  She was placed on Augmentin and Flagyl for 10 days.  Her white blood cell count is normal.  She also reportedly had rectal bleeding and Hemoccult positive stool although no evidence of gross or hemodynamically significant bleeding in the emergency room.    Go ahead and schedule the patient for open access colonoscopy to evaluate her rectal bleeding and diverticulitis.  However this should be scheduled no sooner than the middle of August to allow adequate healing from her diverticulitis as colonoscopy can worsen this problem.  ----- Message -----  From: Sheri Heath MD  Sent: 6/28/2024   9:03 AM EDT  To: Sebastian De Jesus MD

## 2024-08-14 ENCOUNTER — HOSPITAL ENCOUNTER (OUTPATIENT)
Facility: HOSPITAL | Age: 84
Setting detail: OBSERVATION
LOS: 2 days | Discharge: HOME | End: 2024-08-16
Attending: HOSPITALIST | Admitting: HOSPITALIST
Payer: MEDICARE

## 2024-08-14 ENCOUNTER — APPOINTMENT (OUTPATIENT)
Age: 84
End: 2024-08-14
Payer: MEDICARE

## 2024-08-14 ENCOUNTER — HOSPITAL ENCOUNTER (EMERGENCY)
Age: 84
Discharge: ANOTHER HOSPITAL | End: 2024-08-14
Attending: EMERGENCY MEDICINE
Payer: MEDICARE

## 2024-08-14 VITALS
WEIGHT: 136 LBS | OXYGEN SATURATION: 99 % | HEIGHT: 63 IN | SYSTOLIC BLOOD PRESSURE: 153 MMHG | TEMPERATURE: 98.5 F | DIASTOLIC BLOOD PRESSURE: 102 MMHG | BODY MASS INDEX: 24.1 KG/M2 | RESPIRATION RATE: 18 BRPM | HEART RATE: 82 BPM

## 2024-08-14 DIAGNOSIS — K62.5 RECTAL BLEEDING: Primary | ICD-10-CM

## 2024-08-14 LAB
ALBUMIN SERPL-MCNC: 2.7 G/DL (ref 3.4–5)
ALBUMIN/GLOB SERPL: 0.6 (ref 0.8–1.7)
ALP SERPL-CCNC: 100 U/L (ref 45–117)
ALT SERPL-CCNC: 15 U/L (ref 13–56)
ANION GAP SERPL CALC-SCNC: 2 MMOL/L (ref 3–18)
AST SERPL W P-5'-P-CCNC: 40 U/L (ref 10–38)
BASOPHILS # BLD: 0.1 K/UL (ref 0–0.1)
BASOPHILS NFR BLD: 1 % (ref 0–2)
BILIRUB SERPL-MCNC: 0.4 MG/DL (ref 0.2–1)
BUN SERPL-MCNC: 13 MG/DL (ref 7–18)
BUN/CREAT SERPL: 9 (ref 12–20)
CA-I BLD-MCNC: 8.4 MG/DL (ref 8.5–10.1)
CHLORIDE SERPL-SCNC: 109 MMOL/L (ref 100–111)
CO2 SERPL-SCNC: 26 MMOL/L (ref 21–32)
COLLECT DATE STL: NORMAL
CREAT SERPL-MCNC: 1.43 MG/DL (ref 0.6–1.3)
DIFFERENTIAL METHOD BLD: ABNORMAL
EOSINOPHIL # BLD: 0.2 K/UL (ref 0–0.4)
EOSINOPHIL NFR BLD: 2 % (ref 0–5)
ERYTHROCYTE [DISTWIDTH] IN BLOOD BY AUTOMATED COUNT: 15.6 % (ref 11.6–14.5)
GLOBULIN SER CALC-MCNC: 4.8 G/DL (ref 2–4)
GLUCOSE SERPL-MCNC: 98 MG/DL (ref 74–99)
HCT VFR BLD AUTO: 41 % (ref 35–45)
HEMOCCULT SP1 STL QL: POSITIVE
HGB BLD-MCNC: 12.7 G/DL (ref 12–16)
HGB BLD-MCNC: 13.1 G/DL (ref 12–16)
IMM GRANULOCYTES # BLD AUTO: 0.1 K/UL (ref 0–0.04)
IMM GRANULOCYTES NFR BLD AUTO: 1 % (ref 0–0.5)
INR PPP: 1 (ref 0.9–1.1)
LYMPHOCYTES # BLD: 2.8 K/UL (ref 0.9–3.6)
LYMPHOCYTES NFR BLD: 23 % (ref 21–52)
MCH RBC QN AUTO: 29.6 PG (ref 24–34)
MCHC RBC AUTO-ENTMCNC: 32 G/DL (ref 31–37)
MCV RBC AUTO: 92.6 FL (ref 78–100)
MONOCYTES # BLD: 1.1 K/UL (ref 0.05–1.2)
MONOCYTES NFR BLD: 9 % (ref 3–10)
NEUTS SEG # BLD: 7.8 K/UL (ref 1.8–8)
NEUTS SEG NFR BLD: 64 % (ref 40–73)
NRBC # BLD: 0 K/UL (ref 0–0.01)
NRBC BLD-RTO: 0 PER 100 WBC
PLATELET # BLD AUTO: 310 K/UL (ref 135–420)
PMV BLD AUTO: 9.3 FL (ref 9.2–11.8)
POTASSIUM SERPL-SCNC: 4.8 MMOL/L (ref 3.5–5.5)
POTASSIUM SERPL-SCNC: 4.8 MMOL/L (ref 3.5–5.5)
POTASSIUM SERPL-SCNC: 6.9 MMOL/L (ref 3.5–5.5)
PROT SERPL-MCNC: 7.5 G/DL (ref 6.4–8.2)
PROTHROMBIN TIME: 13.1 SEC (ref 11.9–14.9)
RBC # BLD AUTO: 4.43 M/UL (ref 4.2–5.3)
SODIUM SERPL-SCNC: 137 MMOL/L (ref 136–145)
WBC # BLD AUTO: 12.1 K/UL (ref 4.6–13.2)

## 2024-08-14 PROCEDURE — 36415 COLL VENOUS BLD VENIPUNCTURE: CPT

## 2024-08-14 PROCEDURE — 99285 EMERGENCY DEPT VISIT HI MDM: CPT

## 2024-08-14 PROCEDURE — 1100000000 HC RM PRIVATE

## 2024-08-14 PROCEDURE — 6360000002 HC RX W HCPCS: Performed by: EMERGENCY MEDICINE

## 2024-08-14 PROCEDURE — 80048 BASIC METABOLIC PNL TOTAL CA: CPT

## 2024-08-14 PROCEDURE — 93005 ELECTROCARDIOGRAM TRACING: CPT | Performed by: EMERGENCY MEDICINE

## 2024-08-14 PROCEDURE — 74178 CT ABD&PLV WO CNTR FLWD CNTR: CPT

## 2024-08-14 PROCEDURE — 94761 N-INVAS EAR/PLS OXIMETRY MLT: CPT

## 2024-08-14 PROCEDURE — 85025 COMPLETE CBC W/AUTO DIFF WBC: CPT

## 2024-08-14 PROCEDURE — 80053 COMPREHEN METABOLIC PANEL: CPT

## 2024-08-14 PROCEDURE — 6360000004 HC RX CONTRAST MEDICATION: Performed by: EMERGENCY MEDICINE

## 2024-08-14 PROCEDURE — 82272 OCCULT BLD FECES 1-3 TESTS: CPT

## 2024-08-14 PROCEDURE — 84132 ASSAY OF SERUM POTASSIUM: CPT

## 2024-08-14 PROCEDURE — 2580000003 HC RX 258: Performed by: EMERGENCY MEDICINE

## 2024-08-14 PROCEDURE — 85018 HEMOGLOBIN: CPT

## 2024-08-14 PROCEDURE — 96374 THER/PROPH/DIAG INJ IV PUSH: CPT

## 2024-08-14 PROCEDURE — 99223 1ST HOSP IP/OBS HIGH 75: CPT | Performed by: HOSPITALIST

## 2024-08-14 PROCEDURE — 85610 PROTHROMBIN TIME: CPT

## 2024-08-14 RX ORDER — PANTOPRAZOLE SODIUM 40 MG/10ML
40 INJECTION, POWDER, LYOPHILIZED, FOR SOLUTION INTRAVENOUS DAILY
Status: DISCONTINUED | OUTPATIENT
Start: 2024-08-14 | End: 2024-08-14 | Stop reason: HOSPADM

## 2024-08-14 RX ORDER — 0.9 % SODIUM CHLORIDE 0.9 %
1000 INTRAVENOUS SOLUTION INTRAVENOUS ONCE
Status: COMPLETED | OUTPATIENT
Start: 2024-08-14 | End: 2024-08-14

## 2024-08-14 RX ADMIN — IOPAMIDOL 95 ML: 755 INJECTION, SOLUTION INTRAVENOUS at 17:17

## 2024-08-14 RX ADMIN — SODIUM CHLORIDE 1000 ML: 9 INJECTION, SOLUTION INTRAVENOUS at 21:08

## 2024-08-14 RX ADMIN — PANTOPRAZOLE SODIUM 40 MG: 40 INJECTION, POWDER, FOR SOLUTION INTRAVENOUS at 21:03

## 2024-08-14 ASSESSMENT — PAIN DESCRIPTION - ORIENTATION: ORIENTATION: LEFT;LOWER

## 2024-08-14 ASSESSMENT — PAIN DESCRIPTION - LOCATION: LOCATION: ABDOMEN

## 2024-08-14 ASSESSMENT — PAIN - FUNCTIONAL ASSESSMENT: PAIN_FUNCTIONAL_ASSESSMENT: 0-10

## 2024-08-14 ASSESSMENT — PAIN SCALES - GENERAL: PAINLEVEL_OUTOF10: 6

## 2024-08-14 NOTE — ED PROVIDER NOTES
opital medicine Dr. Patel, does not feel comfortable keeping patient here due to lack of GI coverage. Recommends transfer to facility with coverage.     Accepted to [facility]         Records Reviewed (source and summary of external notes): Prior medical records and Nursing notes.    Vitals:    Vitals:    08/14/24 1542 08/14/24 2149 08/14/24 2159   BP: (!) 144/84 (!) 147/91 (!) 153/102   Pulse: 82     Resp: 18     Temp: 98.5 °F (36.9 °C)     TempSrc: Oral     SpO2: 100% 98% 99%   Weight: 61.7 kg (136 lb)     Height: 1.6 m (5' 3\")          ED COURSE  ED Course as of 08/14/24 2303   Wed Aug 14, 2024   1701 Hemolyzed specimin redraw [MC]      ED Course User Index  [] Nabor Kim MD          Patient was given the following medications:  Medications   pantoprazole (PROTONIX) injection 40 mg (40 mg IntraVENous Given 8/14/24 2103)   iopamidol (ISOVUE-370) 76 % injection 95 mL (95 mLs IntraVENous Given 8/14/24 1717)   sodium chloride 0.9 % bolus 1,000 mL (0 mLs IntraVENous Patient Transferred to Other Facility 8/14/24 2230)       CONSULTS: See ED Course/MDM for further details.  None        PROCEDURES   Unless otherwise noted above, none  Procedures      CRITICAL CARE TIME   Patient does not meet Critical Care Time, 0 minutes    ED IMPRESSION     1. Rectal bleeding          DISPOSITION/PLAN   DISPOSITION Decision To Transfer 08/14/2024 10:30:27 PM  Condition at Disposition: Data Unavailable    Transfer: The patient is being transferred to [facility]. The results of their tests and reasons for their transfer have been discussed with the patient and/or available family. The patient/family has conveyed agreement and understanding for the need to be transferred and for their diagnosis. Consultation has been made with Dr. Narayan, who agrees to accept the transfer.       I am the Primary Clinician of Record. Nabor Kim MD (electronically signed)    (Please note that parts of this dictation were

## 2024-08-14 NOTE — ED TRIAGE NOTES
Has been having some blood in her stools for about 2 weeks, saw her PMD because she was having body aches and was prescribed Steroids, has finished the steroids but her stools are becoming more bloody with clots and is concerned

## 2024-08-15 PROBLEM — K92.2 LOWER GI BLEED: Status: ACTIVE | Noted: 2024-08-15

## 2024-08-15 LAB
ANION GAP SERPL CALC-SCNC: 4 MMOL/L (ref 3–18)
BUN SERPL-MCNC: 10 MG/DL (ref 7–18)
BUN/CREAT SERPL: 8 (ref 12–20)
CALCIUM SERPL-MCNC: 8.6 MG/DL (ref 8.5–10.1)
CHLORIDE SERPL-SCNC: 110 MMOL/L (ref 100–111)
CO2 SERPL-SCNC: 23 MMOL/L (ref 21–32)
CREAT SERPL-MCNC: 1.25 MG/DL (ref 0.6–1.3)
EKG ATRIAL RATE: 82 BPM
EKG DIAGNOSIS: NORMAL
EKG P AXIS: 58 DEGREES
EKG P-R INTERVAL: 184 MS
EKG Q-T INTERVAL: 392 MS
EKG QRS DURATION: 90 MS
EKG QTC CALCULATION (BAZETT): 457 MS
EKG R AXIS: -1 DEGREES
EKG T AXIS: 124 DEGREES
EKG VENTRICULAR RATE: 82 BPM
GLUCOSE SERPL-MCNC: 84 MG/DL (ref 74–99)
HCT VFR BLD AUTO: 37.8 % (ref 35–45)
HCT VFR BLD AUTO: 38.5 % (ref 35–45)
HCT VFR BLD AUTO: 39.9 % (ref 35–45)
HCT VFR BLD AUTO: 39.9 % (ref 35–45)
HGB BLD-MCNC: 12 G/DL (ref 12–16)
HGB BLD-MCNC: 12 G/DL (ref 12–16)
HGB BLD-MCNC: 12.4 G/DL (ref 12–16)
HGB BLD-MCNC: 12.5 G/DL (ref 12–16)
POTASSIUM SERPL-SCNC: 4.3 MMOL/L (ref 3.5–5.5)
SODIUM SERPL-SCNC: 137 MMOL/L (ref 136–145)

## 2024-08-15 PROCEDURE — 1100000000 HC RM PRIVATE

## 2024-08-15 PROCEDURE — 2580000003 HC RX 258: Performed by: HOSPITALIST

## 2024-08-15 PROCEDURE — 94761 N-INVAS EAR/PLS OXIMETRY MLT: CPT

## 2024-08-15 PROCEDURE — 6360000002 HC RX W HCPCS: Performed by: HOSPITALIST

## 2024-08-15 PROCEDURE — 85018 HEMOGLOBIN: CPT

## 2024-08-15 PROCEDURE — 85014 HEMATOCRIT: CPT

## 2024-08-15 PROCEDURE — 6370000000 HC RX 637 (ALT 250 FOR IP): Performed by: HOSPITALIST

## 2024-08-15 PROCEDURE — 99232 SBSQ HOSP IP/OBS MODERATE 35: CPT | Performed by: HOSPITALIST

## 2024-08-15 PROCEDURE — 6370000000 HC RX 637 (ALT 250 FOR IP): Performed by: PHYSICIAN ASSISTANT

## 2024-08-15 PROCEDURE — 80048 BASIC METABOLIC PNL TOTAL CA: CPT

## 2024-08-15 PROCEDURE — 36415 COLL VENOUS BLD VENIPUNCTURE: CPT

## 2024-08-15 PROCEDURE — 94640 AIRWAY INHALATION TREATMENT: CPT

## 2024-08-15 RX ORDER — PANTOPRAZOLE SODIUM 40 MG/1
40 TABLET, DELAYED RELEASE ORAL
Status: DISCONTINUED | OUTPATIENT
Start: 2024-08-15 | End: 2024-08-16 | Stop reason: HOSPADM

## 2024-08-15 RX ORDER — ACETAMINOPHEN 650 MG/1
650 SUPPOSITORY RECTAL EVERY 6 HOURS PRN
Status: DISCONTINUED | OUTPATIENT
Start: 2024-08-15 | End: 2024-08-16 | Stop reason: HOSPADM

## 2024-08-15 RX ORDER — PREGABALIN 75 MG/1
75 CAPSULE ORAL DAILY
Status: DISCONTINUED | OUTPATIENT
Start: 2024-08-15 | End: 2024-08-16 | Stop reason: HOSPADM

## 2024-08-15 RX ORDER — LUBIPROSTONE 8 UG/1
16 CAPSULE ORAL 2 TIMES DAILY WITH MEALS
Status: DISCONTINUED | OUTPATIENT
Start: 2024-08-15 | End: 2024-08-16 | Stop reason: HOSPADM

## 2024-08-15 RX ORDER — ARFORMOTEROL TARTRATE 15 UG/2ML
15 SOLUTION RESPIRATORY (INHALATION)
Status: DISCONTINUED | OUTPATIENT
Start: 2024-08-15 | End: 2024-08-16 | Stop reason: HOSPADM

## 2024-08-15 RX ORDER — POTASSIUM CHLORIDE 7.45 MG/ML
10 INJECTION INTRAVENOUS PRN
Status: DISCONTINUED | OUTPATIENT
Start: 2024-08-15 | End: 2024-08-16 | Stop reason: HOSPADM

## 2024-08-15 RX ORDER — MAGNESIUM SULFATE IN WATER 40 MG/ML
2000 INJECTION, SOLUTION INTRAVENOUS PRN
Status: DISCONTINUED | OUTPATIENT
Start: 2024-08-15 | End: 2024-08-16 | Stop reason: HOSPADM

## 2024-08-15 RX ORDER — SODIUM CHLORIDE 9 MG/ML
INJECTION, SOLUTION INTRAVENOUS CONTINUOUS
Status: DISCONTINUED | OUTPATIENT
Start: 2024-08-15 | End: 2024-08-15

## 2024-08-15 RX ORDER — ONDANSETRON 2 MG/ML
4 INJECTION INTRAMUSCULAR; INTRAVENOUS EVERY 6 HOURS PRN
Status: DISCONTINUED | OUTPATIENT
Start: 2024-08-15 | End: 2024-08-16 | Stop reason: HOSPADM

## 2024-08-15 RX ORDER — BUDESONIDE 0.25 MG/2ML
0.25 INHALANT ORAL
Status: DISCONTINUED | OUTPATIENT
Start: 2024-08-15 | End: 2024-08-16 | Stop reason: HOSPADM

## 2024-08-15 RX ORDER — SODIUM CHLORIDE 0.9 % (FLUSH) 0.9 %
5-40 SYRINGE (ML) INJECTION PRN
Status: DISCONTINUED | OUTPATIENT
Start: 2024-08-15 | End: 2024-08-16 | Stop reason: HOSPADM

## 2024-08-15 RX ORDER — GABAPENTIN 100 MG/1
100 CAPSULE ORAL
Status: DISCONTINUED | OUTPATIENT
Start: 2024-08-15 | End: 2024-08-16 | Stop reason: HOSPADM

## 2024-08-15 RX ORDER — SODIUM CHLORIDE 0.9 % (FLUSH) 0.9 %
5-40 SYRINGE (ML) INJECTION EVERY 12 HOURS SCHEDULED
Status: DISCONTINUED | OUTPATIENT
Start: 2024-08-15 | End: 2024-08-16 | Stop reason: HOSPADM

## 2024-08-15 RX ORDER — POTASSIUM CHLORIDE 20 MEQ/1
40 TABLET, EXTENDED RELEASE ORAL PRN
Status: DISCONTINUED | OUTPATIENT
Start: 2024-08-15 | End: 2024-08-16 | Stop reason: HOSPADM

## 2024-08-15 RX ORDER — ALPRAZOLAM 0.5 MG/1
0.25 TABLET ORAL EVERY 12 HOURS PRN
Status: DISCONTINUED | OUTPATIENT
Start: 2024-08-15 | End: 2024-08-16 | Stop reason: HOSPADM

## 2024-08-15 RX ORDER — SODIUM CHLORIDE 9 MG/ML
INJECTION, SOLUTION INTRAVENOUS PRN
Status: DISCONTINUED | OUTPATIENT
Start: 2024-08-15 | End: 2024-08-16 | Stop reason: HOSPADM

## 2024-08-15 RX ORDER — CARVEDILOL 6.25 MG/1
6.25 TABLET ORAL 2 TIMES DAILY WITH MEALS
Status: DISCONTINUED | OUTPATIENT
Start: 2024-08-15 | End: 2024-08-16 | Stop reason: HOSPADM

## 2024-08-15 RX ORDER — ONDANSETRON 4 MG/1
4 TABLET, ORALLY DISINTEGRATING ORAL EVERY 8 HOURS PRN
Status: DISCONTINUED | OUTPATIENT
Start: 2024-08-15 | End: 2024-08-16 | Stop reason: HOSPADM

## 2024-08-15 RX ORDER — ACETAMINOPHEN 325 MG/1
650 TABLET ORAL EVERY 6 HOURS PRN
Status: DISCONTINUED | OUTPATIENT
Start: 2024-08-15 | End: 2024-08-16 | Stop reason: HOSPADM

## 2024-08-15 RX ORDER — POLYETHYLENE GLYCOL 3350 17 G/17G
17 POWDER, FOR SOLUTION ORAL DAILY PRN
Status: DISCONTINUED | OUTPATIENT
Start: 2024-08-15 | End: 2024-08-16 | Stop reason: HOSPADM

## 2024-08-15 RX ADMIN — ARFORMOTEROL TARTRATE 15 MCG: 15 SOLUTION RESPIRATORY (INHALATION) at 20:15

## 2024-08-15 RX ADMIN — GABAPENTIN 100 MG: 100 CAPSULE ORAL at 22:03

## 2024-08-15 RX ADMIN — BUDESONIDE 250 MCG: 0.25 INHALANT RESPIRATORY (INHALATION) at 20:15

## 2024-08-15 RX ADMIN — IPRATROPIUM BROMIDE 0.5 MG: 0.5 SOLUTION RESPIRATORY (INHALATION) at 20:15

## 2024-08-15 RX ADMIN — CARVEDILOL 6.25 MG: 6.25 TABLET, FILM COATED ORAL at 15:40

## 2024-08-15 RX ADMIN — LUBIPROSTONE 16 MCG: 8 CAPSULE, GELATIN COATED ORAL at 12:01

## 2024-08-15 RX ADMIN — ARFORMOTEROL TARTRATE: 15 SOLUTION RESPIRATORY (INHALATION) at 08:13

## 2024-08-15 RX ADMIN — PANTOPRAZOLE SODIUM 40 MG: 40 TABLET, DELAYED RELEASE ORAL at 07:29

## 2024-08-15 RX ADMIN — SACUBITRIL AND VALSARTAN 1 TABLET: 24; 26 TABLET, FILM COATED ORAL at 22:03

## 2024-08-15 RX ADMIN — SODIUM CHLORIDE, PRESERVATIVE FREE 10 ML: 5 INJECTION INTRAVENOUS at 08:32

## 2024-08-15 RX ADMIN — LUBIPROSTONE 16 MCG: 8 CAPSULE, GELATIN COATED ORAL at 22:03

## 2024-08-15 RX ADMIN — SACUBITRIL AND VALSARTAN 1 TABLET: 24; 26 TABLET, FILM COATED ORAL at 14:44

## 2024-08-15 RX ADMIN — SODIUM CHLORIDE, PRESERVATIVE FREE 10 ML: 5 INJECTION INTRAVENOUS at 22:32

## 2024-08-15 RX ADMIN — PREGABALIN 75 MG: 75 CAPSULE ORAL at 08:28

## 2024-08-15 RX ADMIN — SODIUM CHLORIDE: 9 INJECTION, SOLUTION INTRAVENOUS at 04:38

## 2024-08-15 ASSESSMENT — PAIN SCALES - GENERAL
PAINLEVEL_OUTOF10: 0

## 2024-08-15 NOTE — PLAN OF CARE
Problem: Chronic Conditions and Co-morbidities  Goal: Patient's chronic conditions and co-morbidity symptoms are monitored and maintained or improved  8/15/2024 0941 by Beulah Ro RN  Outcome: Progressing  Flowsheets (Taken 8/15/2024 0830)  Care Plan - Patient's Chronic Conditions and Co-Morbidity Symptoms are Monitored and Maintained or Improved: Monitor and assess patient's chronic conditions and comorbid symptoms for stability, deterioration, or improvement  8/15/2024 0038 by Gaby Ritter, RN  Outcome: Progressing  Flowsheets (Taken 8/15/2024 0010)  Care Plan - Patient's Chronic Conditions and Co-Morbidity Symptoms are Monitored and Maintained or Improved: Monitor and assess patient's chronic conditions and comorbid symptoms for stability, deterioration, or improvement     Problem: Pain  Goal: Verbalizes/displays adequate comfort level or baseline comfort level  8/15/2024 0941 by Beulah Ro RN  Outcome: Progressing  8/15/2024 0038 by Gaby Ritter RN  Outcome: Progressing     Problem: ABCDS Injury Assessment  Goal: Absence of physical injury  8/15/2024 0941 by Beulah Ro RN  Outcome: Progressing  Flowsheets (Taken 8/15/2024 0940)  Absence of Physical Injury: Implement safety measures based on patient assessment  8/15/2024 0038 by Gaby Ritter RN  Outcome: Progressing     Problem: Safety - Adult  Goal: Free from fall injury  Outcome: Progressing  Flowsheets (Taken 8/15/2024 0940)  Free From Fall Injury:   Instruct family/caregiver on patient safety   Based on caregiver fall risk screen, instruct family/caregiver to ask for assistance with transferring infant if caregiver noted to have fall risk factors

## 2024-08-15 NOTE — CARE COORDINATION
08/15/24 1143   Service Assessment   Patient Orientation Alert and Oriented   Cognition Alert   History Provided By Patient   Primary Caregiver Self   Accompanied By/Relationship None   Support Systems Children;Family Members   Patient's Healthcare Decision Maker is: Legal Next of Kin   PCP Verified by CM Yes   Last Visit to PCP Within last 3 months   Prior Functional Level Assistance with the following:;Bathing;Dressing;Toileting;Cooking;Housework;Shopping;Mobility   Current Functional Level Assistance with the following:;Dressing;Toileting;Bathing;Cooking;Housework;Shopping;Mobility   Can patient return to prior living arrangement Yes   Ability to make needs known: Good   Family able to assist with home care needs: Yes   Would you like for me to discuss the discharge plan with any other family members/significant others, and if so, who? Yes   Financial Resources Medicare;Medicaid   Community Resources None   Social/Functional History   Lives With Alone   Type of Home Apartment   Home Layout Able to Live on Main level with bedroom/bathroom   Home Access Level entry   Bathroom Shower/Tub Tub/Shower unit   Bathroom Toilet Standard   Bathroom Equipment None   Home Equipment Cane;Rollator   Receives Help From Family   ADL Assistance Needs assistance   Toileting Needs assistance   Homemaking Assistance Needs assistance   Homemaking Responsibilities No   Ambulation Assistance Needs assistance   Transfer Assistance Needs assistance   Active  No   Patient's  Info Family transports   Mode of Transportation Car   Occupation Retired   Discharge Planning   Type of Residence Apartment   Living Arrangements Alone   Current Services Prior To Admission None   Potential Assistance Needed N/A   DME Ordered? No   Potential Assistance Purchasing Medications No   Type of Home Care Services Aide Services  (Pt states her grand daughter is her CNA and she takes care of her 8hr/day, 7 days a week.)   One/Two Story Residence

## 2024-08-15 NOTE — ADT AUTH CERT
Mental Status: She is alert and oriented to person, place, and time.   Psychiatric:         Mood and Affect: Mood normal.            SCREENINGS                No data recorded     LAB, EKG AND DIAGNOSTIC RESULTS   Labs:  Recent Results          Recent Results (from the past 12 hour(s))   CBC with Auto Differential     Collection Time: 08/14/24  4:05 PM   Result Value Ref Range     WBC 12.1 4.6 - 13.2 K/uL     RBC 4.43 4.20 - 5.30 M/uL     Hemoglobin 13.1 12.0 - 16.0 g/dL     Hematocrit 41.0 35.0 - 45.0 %     MCV 92.6 78.0 - 100.0 FL     MCH 29.6 24.0 - 34.0 PG     MCHC 32.0 31.0 - 37.0 g/dL     RDW 15.6 (H) 11.6 - 14.5 %     Platelets 310 135 - 420 K/uL     MPV 9.3 9.2 - 11.8 FL     Nucleated RBCs 0.0 0.0  WBC     nRBC 0.00 0.00 - 0.01 K/uL     Neutrophils % 64 40 - 73 %     Lymphocytes % 23 21 - 52 %     Monocytes % 9 3 - 10 %     Eosinophils % 2 0 - 5 %     Basophils % 1 0 - 2 %     Immature Granulocytes % 1 (H) 0 - 0.5 %     Neutrophils Absolute 7.8 1.8 - 8.0 K/UL     Lymphocytes Absolute 2.8 0.9 - 3.6 K/UL     Monocytes Absolute 1.1 0.05 - 1.2 K/UL     Eosinophils Absolute 0.2 0.0 - 0.4 K/UL     Basophils Absolute 0.1 0.0 - 0.1 K/UL     Immature Granulocytes Absolute 0.1 (H) 0.00 - 0.04 K/UL     Differential Type AUTOMATED     Comprehensive Metabolic Panel     Collection Time: 08/14/24  4:05 PM   Result Value Ref Range     Sodium 137 136 - 145 mmol/L     Potassium 6.9 (HH) 3.5 - 5.5 mmol/L     Chloride 109 100 - 111 mmol/L     CO2 26 21 - 32 mmol/L     Anion Gap 2 (L) 3.0 - 18.0 mmol/L     Glucose 98 74 - 99 mg/dL     BUN 13 7 - 18 mg/dL     Creatinine 1.43 (H) 0.60 - 1.30 mg/dL     BUN/Creatinine Ratio 9 (L) 12 - 20       Est, Glom Filt Rate 36 (L) >60 ml/min/1.73m2     Calcium 8.4 (L) 8.5 - 10.1 mg/dL     Total Bilirubin 0.4 0.2 - 1.0 mg/dL     AST 40 (H) 10 - 38 U/L     ALT 15 13 - 56 U/L     Alk Phosphatase 100 45 - 117 U/L     Total Protein 7.5 6.4 - 8.2 g/dL     Albumin 2.7 (L) 3.4 - 5.0 g/dL    1. Rectal bleeding          DISPOSITION/PLAN   DISPOSITION Decision To Transfer 08/14/2024 10:30:27 PM  Condition at Disposition: Data Unavailable    Transfer: The patient is being transferred to [facility]. The results of their tests and reasons for their transfer have been discussed with the patient and/or available family. The patient/family has conveyed agreement and understanding for the need to be transferred and for their diagnosis. Consultation has been made with Dr. Narayan, who agrees to accept the transfer.         I am the Primary Clinician of Record. Nabor Kim MD (electronically signed)     (Please note that parts of this dictation were completed with voice recognition software. Quite often unanticipated grammatical, syntax, homophones, and other interpretive errors are inadvertently transcribed by the computer software. Please disregards these errors. Please excuse any errors that have escaped final proofreading.)      Nabor Kim MD  08/14/24 6747                 Electronically signed by Nabor Kim MD at 8/14/2024 11:06 PM

## 2024-08-15 NOTE — ED NOTES
Pt going to [facility] room 468.  Report number is [phone].  [facility] eta is 1.5hr.

## 2024-08-15 NOTE — CONSULTS
GASTROENTEROLOGY CONSULT        Impression/Plan:   Lower GI bleed suggestive of diverticular bleed without acute blood loss anemia   - colonoscopy not recommended at this time as often difficult to find to bleeding culprit diverticulum in an unprepped colon.   - CTA with bleeding protocol if recurrent voluminous lower GI bleed for possible IR angio and embolism if indicated vs NM bleeding scan.   - H/H monitoring per primary team.  Constipation, chronic   - bowel optimization recommended    - start Amitiza 16 mcg BID   - low residue diet. Recommend high fiber diet at home and ongoing bowel optimization.  Diverticulosis, severe  LLQ tenderness  History of colon polyp   - consideration for outpatient colonoscopy to remove polyp; however, patient's health (benefits vs risks) to determine candidacy.          Chief Complaint: lower GI bleed      HPI:  Marly Maravilla is a 84 y.o. female who I am being asked to see in consultation for an opinion regarding lower GI bleed that start this past Tuesday. Patient with known pan-diverticulosis and prior episode of hematochezia from 4/2023. Patient mentions spontaneous passage of red blood associated with blood clots during BM. She has chronic constipation and was talking 2 stool softeners twice a day. She thinks this was working for her. She came to the ED 8/14/24 for evaluation. CT with confirmation of severe diverticulosis without evidence of active bleeding. Labs without evidence of blood loss anemia; stool positive for blood. PPI was started and she was admitted for ongoing medical management. Colonoscopy completed 4/3/2023 by Dr Middleton as inpatient due to hematochezia - findings of pan-diverticulosis and one flat polyp (undisturbed). Recommendation for repeat colonoscopy as outpatient to address polyp. Notes that today, she saw a small amount of light colored blood with wiping this morning. No clots. Her LLQ is present but lessening. She is tolerating clears and ready for  axis  Confirmed by MCKINLEY HICKEY ([id number]) on 8/15/2024 9:11:09 AM     Potassium    Collection Time: 08/14/24  5:01 PM   Result Value Ref Range    Potassium 4.8 3.5 - 5.5 mmol/L   Potassium    Collection Time: 08/14/24  5:01 PM   Result Value Ref Range    Potassium 4.8 3.5 - 5.5 mmol/L   Occult Blood, Fecal    Collection Time: 08/14/24  5:12 PM   Result Value Ref Range    Occult Blood, Stool #1 Positive      Date 8,142,024     Hemoglobin    Collection Time: 08/14/24  9:10 PM   Result Value Ref Range    Hemoglobin 12.7 12.0 - 16.0 g/dL   Basic Metabolic Panel w/ Reflex to MG    Collection Time: 08/15/24  4:20 AM   Result Value Ref Range    Sodium 137 136 - 145 mmol/L    Potassium 4.3 3.5 - 5.5 mmol/L    Chloride 110 100 - 111 mmol/L    CO2 23 21 - 32 mmol/L    Anion Gap 4 3.0 - 18 mmol/L    Glucose 84 74 - 99 mg/dL    BUN 10 7.0 - 18 MG/DL    Creatinine 1.25 0.6 - 1.3 MG/DL    BUN/Creatinine Ratio 8 (L) 12 - 20      Est, Glom Filt Rate 42 (L) >60 ml/min/1.73m2    Calcium 8.6 8.5 - 10.1 MG/DL   Hemoglobin and Hematocrit    Collection Time: 08/15/24  4:20 AM   Result Value Ref Range    Hemoglobin 12.4 12.0 - 16.0 g/dL    Hematocrit 39.9 35.0 - 45.0 %                MICROBIOLOGY   Results       ** No results found for the last 336 hours. **                   Radiology    CT ABDOMEN PELVIS W WO CONTRAST Additional Contrast? Radiologist Recommendation  Narrative: EXAM: CT ABDOMEN PELVIS W WO CONTRAST    INDICATION: LLq pain guarding    COMPARISON: 6/27/2024.    IV CONTRAST: 100 mL of Isovue-370.    ORAL CONTRAST: None    TECHNIQUE:    Multislice helical CT was performed from the diaphragm to the iliac crest prior  to intravenous contrast administration and from the diaphragm to the symphysis  pubis following intravenous contrast administration.  Contiguous 5 mm axial  images were reconstructed and lung and soft tissue windows were generated.  Coronal and sagittal reformations were generated.  CT dose reduction was  achieved

## 2024-08-15 NOTE — H&P
History and Physical      Chief complaint Bleeding per rectum    Subjective     HPI: Marly Maravilla is an 84 y.o. female with a PMHx of hypertension, chronic systolic and diastolic CHF, COPD, paroxysmal A-fib, CAD, diverticulitis and history of GI bleed who presented to the emergency room at [facility] for the above chief complaint.  Patient states that she started having bloody stools yesterday so went to the emergency room for evaluation. She has normal bloating and left lower abdominal pain. Patient also complains of lightheadedness but denies any chest pain or shortness of breath. She denies any fever or chills.  She has nausea but no vomiting.  She is not on antiplatelets or blood thinners.  Last colonoscopy was April 2023.  Patient denies smoking or alcohol use.    In the emergency room vital signs were stable.  Workup showed WBC 12.1 hemoglobin 13.1 hematocrit 41.0 platelets 310.  Serum sodium 137 potassium 4.8 bicarb 26 BUN 13 creatinine 1.43.  LFTs normal.  PT 13.1 INR 1.0.  CT abdomen and pelvis showed severe diverticulosis of the left colon. No active extravasation. There is long segment thickening of this area of colon likely due to diverticulosis however correlate with recent colonoscopy to exclude underlying mass lesion. Prominent metal stool within the proximal colon.  Patient treated with 40 mg of IV Protonix.  GI was consulted.  ED provider discussed case with Dr. Holt and patient admitted for management of lower GI bleed.    PMHx:  Past Medical History:   Diagnosis Date    Allergic rhinitis     Arthritis     knee - pending surg    Asthma     CHF (congestive heart failure) (HCC)     Chronic obstructive pulmonary disease (HCC)     mild per pulmonary notes    Diverticulitis large intestine     GERD (gastroesophageal reflux disease)     GI bleed 11/09/2022    Hypertension     Thromboembolus (HCC)     many years ago    Thyroid disease     cyst on thyroid       PSurgHx:  Past Surgical  beta-blocker  Patient not on blood thinners due to GI bleed    History of systolic and diastolic CHF  No evidence of acute exacerbation  Monitor input output and daily a.m. weight  Tele monitoring    Acute kidney injury  Likely prerenal  Will do gentle IV fluid hydration  Monitor urine output and BMP  Avoid nephrotoxins    GERD  Continue PPI      Anticipated Discharge: Home pending clinical improvement    Full code    Diet clear liquid diet    DVT Prophylaxis:  []Lovenox  []Hep SQ  [x]SCDs  []Coumadin []DOAC  []On Heparin gtt     I have personally reviewed all pertinent labs, films and EKGs that have officially resulted. I reviewed available electronic documentation outlining the initial presentation as well as the emergency room physician's encounter. @[email or web address]@    Savanna Narayan MD

## 2024-08-15 NOTE — PLAN OF CARE
Problem: Chronic Conditions and Co-morbidities  Goal: Patient's chronic conditions and co-morbidity symptoms are monitored and maintained or improved  Outcome: Progressing     Problem: Pain  Goal: Verbalizes/displays adequate comfort level or baseline comfort level  Outcome: Progressing     Problem: ABCDS Injury Assessment  Goal: Absence of physical injury  Outcome: Progressing

## 2024-08-15 NOTE — PROGRESS NOTES
4 Eyes Skin Assessment     NAME:  Marly Maravilla  YOB: 1940  MEDICAL RECORD NUMBER:  [id number]    The patient is being assessed for  Admission    I agree that at least one RN has performed a thorough Head to Toe Skin Assessment on the patient. ALL assessment sites listed below have been assessed.      Areas assessed by both nurses:    Head, Face, Ears, Shoulders, Back, Chest, Arms, Elbows, Hands, Sacrum. Buttock, Coccyx, Ischium, Legs. Feet and Heels, and Under Medical Devices         Does the Patient have a Wound? No noted wound(s)       Betito Prevention initiated by RN: No  Wound Care Orders initiated by RN: No    Pressure Injury (Stage 3,4, Unstageable, DTI, NWPT, and Complex wounds) if present, place Wound referral order by RN under : No    New Ostomies, if present place, Ostomy referral order under : No     Nurse 1 eSignature: Electronically signed by Gaby Villar RN on 8/15/24 at 3:06 AM EDT    **SHARE this note so that the co-signing nurse can place an eSignature**    Nurse 2 eSignature: Electronically signed by Maria Esther Johnson RN on 8/15/24 at 7:11 AM EDT

## 2024-08-15 NOTE — PROGRESS NOTES
[facility] Hospitalist Group  Progress Note    Patient: Marly Maravilla Age: 84 y.o. : 1940 MR#: [id number] SSN: [id number]  Date/Time: 8/15/2024     Subjective: Patient sitting on the side of the bed, feeling good.  Did not have a whole lot of bleeding since in the hospital, 1 episode where she wiped her tissue had some streaks of blood on the tissue.  No bright red rectal bleeding today.  Patient states she had a lot of bleeding at home.  Granddaughter and family friend at the bedside.     Assessment/Plan:   Lower GI bleed, possible diverticular  Hypertension  Chronic systolic heart failure, EF 25%  COPD  Paroxysmal atrial fibrillation  Prerenal azotemia  Advanced age    Plan  GI input noted, will monitor H&H and transfuse if needed  If recurrent bleeding, will consider bleeding scan  BP elevated, will resume low-dose of Coreg and monitor  Will resume home medications including Entresto, Neurontin and bronchodilators  Will discontinue IV fluids  Start full liquid diet and will advance as tolerated  PT/OT eval and treatment  Further plan based on hospital course    Discussed with patient and also family including granddaughter at the bedside and explained about my above plan care.  Both of them understood and agreed with the plan.      Dispo plan: Home with home health care once medically stable, anticipated discharge date 2024      Case discussed with:  [x]Patient  [x]Family  [x]Nursing  []Case Management  DVT Prophylaxis:  []Lovenox  []Hep SQ  [x]SCDs  []Coumadin   []Eliquis/Xarelto     Objective:   VS: BP (!) 147/74   Pulse 70   Temp 97.5 °F (36.4 °C) (Oral)   Resp 19   Wt 61.2 kg (135 lb)   SpO2 99%   BMI 23.91 kg/m²    Tmax/24hrs: Temp (24hrs), Av.1 °F (36.7 °C), Min:97.5 °F (36.4 °C), Max:98.5 °F (36.9 °C)  IOBRIEF  Intake/Output Summary (Last 24 hours) at 8/15/2024 1410  Last data filed at 8/15/2024 1128  Gross per 24 hour   Intake 815.78 ml   Output 800 ml  (ATROVENT) 0.02 % nebulizer solution 0.5 mg  0.5 mg Nebulization Q6H RT    ALPRAZolam (XANAX) tablet 0.25 mg  0.25 mg Oral Q12H PRN    carvedilol (COREG) tablet 6.25 mg  6.25 mg Oral BID WC    gabapentin (NEURONTIN) capsule 100 mg  100 mg Oral QHS    sacubitril-valsartan (ENTRESTO) 24-26 MG per tablet 1 tablet  1 tablet Oral BID       Labs:    Recent Results (from the past 24 hour(s))   CBC with Auto Differential    Collection Time: 08/14/24  4:05 PM   Result Value Ref Range    WBC 12.1 4.6 - 13.2 K/uL    RBC 4.43 4.20 - 5.30 M/uL    Hemoglobin 13.1 12.0 - 16.0 g/dL    Hematocrit 41.0 35.0 - 45.0 %    MCV 92.6 78.0 - 100.0 FL    MCH 29.6 24.0 - 34.0 PG    MCHC 32.0 31.0 - 37.0 g/dL    RDW 15.6 (H) 11.6 - 14.5 %    Platelets 310 135 - 420 K/uL    MPV 9.3 9.2 - 11.8 FL    Nucleated RBCs 0.0 0.0  WBC    nRBC 0.00 0.00 - 0.01 K/uL    Neutrophils % 64 40 - 73 %    Lymphocytes % 23 21 - 52 %    Monocytes % 9 3 - 10 %    Eosinophils % 2 0 - 5 %    Basophils % 1 0 - 2 %    Immature Granulocytes % 1 (H) 0 - 0.5 %    Neutrophils Absolute 7.8 1.8 - 8.0 K/UL    Lymphocytes Absolute 2.8 0.9 - 3.6 K/UL    Monocytes Absolute 1.1 0.05 - 1.2 K/UL    Eosinophils Absolute 0.2 0.0 - 0.4 K/UL    Basophils Absolute 0.1 0.0 - 0.1 K/UL    Immature Granulocytes Absolute 0.1 (H) 0.00 - 0.04 K/UL    Differential Type AUTOMATED     Comprehensive Metabolic Panel    Collection Time: 08/14/24  4:05 PM   Result Value Ref Range    Sodium 137 136 - 145 mmol/L    Potassium 6.9 (HH) 3.5 - 5.5 mmol/L    Chloride 109 100 - 111 mmol/L    CO2 26 21 - 32 mmol/L    Anion Gap 2 (L) 3.0 - 18.0 mmol/L    Glucose 98 74 - 99 mg/dL    BUN 13 7 - 18 mg/dL    Creatinine 1.43 (H) 0.60 - 1.30 mg/dL    BUN/Creatinine Ratio 9 (L) 12 - 20      Est, Glom Filt Rate 36 (L) >60 ml/min/1.73m2    Calcium 8.4 (L) 8.5 - 10.1 mg/dL    Total Bilirubin 0.4 0.2 - 1.0 mg/dL    AST 40 (H) 10 - 38 U/L    ALT 15 13 - 56 U/L    Alk Phosphatase 100 45 - 117 U/L    Total Protein

## 2024-08-16 VITALS
TEMPERATURE: 97.8 F | DIASTOLIC BLOOD PRESSURE: 64 MMHG | BODY MASS INDEX: 23.91 KG/M2 | HEART RATE: 100 BPM | RESPIRATION RATE: 18 BRPM | OXYGEN SATURATION: 97 % | WEIGHT: 135 LBS | SYSTOLIC BLOOD PRESSURE: 106 MMHG

## 2024-08-16 PROBLEM — K92.2 ACUTE GI BLEEDING: Status: RESOLVED | Noted: 2022-11-09 | Resolved: 2024-08-16

## 2024-08-16 PROBLEM — K92.2 LOWER GI BLEED: Status: RESOLVED | Noted: 2024-08-15 | Resolved: 2024-08-16

## 2024-08-16 LAB
HCT VFR BLD AUTO: 38.3 % (ref 35–45)
HGB BLD-MCNC: 12 G/DL (ref 12–16)

## 2024-08-16 PROCEDURE — 2580000003 HC RX 258: Performed by: HOSPITALIST

## 2024-08-16 PROCEDURE — 6370000000 HC RX 637 (ALT 250 FOR IP): Performed by: PHYSICIAN ASSISTANT

## 2024-08-16 PROCEDURE — 6370000000 HC RX 637 (ALT 250 FOR IP): Performed by: HOSPITALIST

## 2024-08-16 PROCEDURE — 97166 OT EVAL MOD COMPLEX 45 MIN: CPT

## 2024-08-16 PROCEDURE — 6360000002 HC RX W HCPCS: Performed by: HOSPITALIST

## 2024-08-16 PROCEDURE — 94761 N-INVAS EAR/PLS OXIMETRY MLT: CPT

## 2024-08-16 PROCEDURE — G0378 HOSPITAL OBSERVATION PER HR: HCPCS

## 2024-08-16 PROCEDURE — 99239 HOSP IP/OBS DSCHRG MGMT >30: CPT | Performed by: HOSPITALIST

## 2024-08-16 PROCEDURE — 36415 COLL VENOUS BLD VENIPUNCTURE: CPT

## 2024-08-16 PROCEDURE — 85018 HEMOGLOBIN: CPT

## 2024-08-16 PROCEDURE — 97535 SELF CARE MNGMENT TRAINING: CPT

## 2024-08-16 PROCEDURE — 94640 AIRWAY INHALATION TREATMENT: CPT

## 2024-08-16 PROCEDURE — 85014 HEMATOCRIT: CPT

## 2024-08-16 RX ADMIN — LUBIPROSTONE 16 MCG: 8 CAPSULE, GELATIN COATED ORAL at 09:34

## 2024-08-16 RX ADMIN — BUDESONIDE 250 MCG: 0.25 INHALANT RESPIRATORY (INHALATION) at 08:35

## 2024-08-16 RX ADMIN — SACUBITRIL AND VALSARTAN 1 TABLET: 24; 26 TABLET, FILM COATED ORAL at 09:34

## 2024-08-16 RX ADMIN — IPRATROPIUM BROMIDE 0.5 MG: 0.5 SOLUTION RESPIRATORY (INHALATION) at 08:35

## 2024-08-16 RX ADMIN — ARFORMOTEROL TARTRATE 15 MCG: 15 SOLUTION RESPIRATORY (INHALATION) at 08:35

## 2024-08-16 RX ADMIN — PANTOPRAZOLE SODIUM 40 MG: 40 TABLET, DELAYED RELEASE ORAL at 06:55

## 2024-08-16 RX ADMIN — PREGABALIN 75 MG: 75 CAPSULE ORAL at 09:35

## 2024-08-16 RX ADMIN — SODIUM CHLORIDE, PRESERVATIVE FREE 10 ML: 5 INJECTION INTRAVENOUS at 09:36

## 2024-08-16 RX ADMIN — IPRATROPIUM BROMIDE 0.5 MG: 0.5 SOLUTION RESPIRATORY (INHALATION) at 01:01

## 2024-08-16 RX ADMIN — CARVEDILOL 6.25 MG: 6.25 TABLET, FILM COATED ORAL at 09:35

## 2024-08-16 ASSESSMENT — PAIN SCALES - GENERAL
PAINLEVEL_OUTOF10: 0
PAINLEVEL_OUTOF10: 0

## 2024-08-16 NOTE — DISCHARGE SUMMARY
Discharge Summary    Patient: Marly Maravilla MRN: [id number]  CSN: [id number]    YOB: 1940  Age: 84 y.o.  Sex: female    DOA: 8/14/2024 LOS:  LOS: 2 days        Disposition: Home    Discharge Date: 8/16/2024    Admission Diagnosis: Lower GI bleed [K92.2]    Discharge Diagnosis:    Lower GI bleed, possible diverticular  Hypertension  Chronic systolic heart failure, EF 25%  COPD  Paroxysmal atrial fibrillation  Prerenal azotemia  Advanced age    Discharge Condition: Stable      PHYSICAL EXAM  Visit Vitals  /64   Pulse 100   Temp 97.8 °F (36.6 °C) (Oral)   Resp 18   Wt 61.2 kg (135 lb)   SpO2 97%   BMI 23.91 kg/m²       General: Alert, cooperative, no acute distress    HEENT: PERRLA, EOMI. Anicteric sclerae.  Lungs:  CTA Bilaterally. No Wheezing/Rales.  Heart:             Regular rate and Rhythm.  Abdomen: Soft, Non distended, Non tender. + Bowel sounds.  Extremities: No edema.  Psych:   Good insight. Not anxious or agitated.  Neurologic:  AA, oriented X 3. Moves all ext                                 Hospital Course:   Marly Maravilla is an 84 y.o. female with a PMHx of hypertension, chronic systolic and diastolic CHF, COPD, paroxysmal A-fib, CAD, diverticulitis and history of GI bleed who presented to the emergency room at [facility] for the above chief complaint.  Patient states that she started having bloody stools yesterday so went to the emergency room for evaluation. She has normal bloating and left lower abdominal pain. Patient also complains of lightheadedness but denies any chest pain or shortness of breath. She denies any fever or chills.  She has nausea but no vomiting.  She is not on antiplatelets or blood thinners.     In the emergency room vital signs were stable.  Workup showed WBC 12.1 hemoglobin 13.1 hematocrit 41.0 platelets 310.  Serum sodium 137 potassium 4.8 bicarb 26 BUN 13 creatinine 1.43.  LFTs normal.  PT 13.1 INR 1.0.  CT abdomen and pelvis showed severe

## 2024-08-16 NOTE — PLAN OF CARE
Problem: Chronic Conditions and Co-morbidities  Goal: Patient's chronic conditions and co-morbidity symptoms are monitored and maintained or improved  Outcome: Progressing  Flowsheets (Taken 8/16/2024 0814)  Care Plan - Patient's Chronic Conditions and Co-Morbidity Symptoms are Monitored and Maintained or Improved:   Monitor and assess patient's chronic conditions and comorbid symptoms for stability, deterioration, or improvement   Collaborate with multidisciplinary team to address chronic and comorbid conditions and prevent exacerbation or deterioration     Problem: Pain  Goal: Verbalizes/displays adequate comfort level or baseline comfort level  Outcome: Progressing  Flowsheets  Taken 8/16/2024 0835 by Beulah Ro RN  Verbalizes/displays adequate comfort level or baseline comfort level:   Assess pain using appropriate pain scale   Administer analgesics based on type and severity of pain and evaluate response  Taken 8/16/2024 0715 by Sunil Booth RN  Verbalizes/displays adequate comfort level or baseline comfort level:   Assess pain using appropriate pain scale   Administer analgesics based on type and severity of pain and evaluate response  Taken 8/15/2024 2315 by Sunil Booth RN  Verbalizes/displays adequate comfort level or baseline comfort level:   Assess pain using appropriate pain scale   Administer analgesics based on type and severity of pain and evaluate response     Problem: ABCDS Injury Assessment  Goal: Absence of physical injury  Outcome: Progressing  Flowsheets (Taken 8/16/2024 1042)  Absence of Physical Injury: Implement safety measures based on patient assessment     Problem: Safety - Adult  Goal: Free from fall injury  Outcome: Progressing  Flowsheets (Taken 8/16/2024 1042)  Free From Fall Injury:   Instruct family/caregiver on patient safety   Based on caregiver fall risk screen, instruct family/caregiver to ask for assistance with transferring infant if caregiver noted to have  fall risk factors

## 2024-08-16 NOTE — PROGRESS NOTES
Gastroenterology follow up-Progress note    Impression/Plan:  Lower GI bleed suggestive of diverticular bleed without acute blood loss anemia - resolved              - colonoscopy not recommended at this time as often difficult to find to bleeding culprit diverticulum in an unprepped colon.              - CTA with bleeding protocol if recurrent voluminous lower GI bleed for possible IR angio and embolism if indicated vs NM bleeding scan.              - H/H monitoring per primary team.  Constipation, chronic with stool burden on CT              - ongoing bowel optimization with Amitiza 16 mcg BID              - low residue diet. Recommend high fiber diet at home and ongoing bowel optimization.  Diverticulosis, severe  LLQ tenderness  History of colon polyp              - consideration for outpatient colonoscopy to remove polyp; however, patient's health (benefits vs risks) to determine candidacy.      Will coordinate for outpatient GI follow-up. Signed off but available as needed.  Thank you for this consultation and the opportunity to participate in the care of this patient. Please do not hesitate to call with any questions or concerns, or should event occur that may necessitate additional GI evaluation.        Subjective:  no ongoing GI bleed. She had a satisfactory BM this morning with help of the Amitiza capsules. No abdominal pain. Tolerating meals without difficulty.    ROS: Denies any fevers, chills, rash.     Patient Active Problem List   Diagnosis    Asthma    Chronic obstructive pulmonary disease (HCC)    GERD (gastroesophageal reflux disease)    Thyroid disease    Arthritis    Hypertension    Allergic rhinitis    Sciatica    Diverticulitis    GI bleed    Acute GI bleeding    Heart failure with reduced ejection fraction (HCC)    Acute lower GI bleeding    Combined systolic and diastolic congestive heart failure (HCC)    Localized swelling of right lower extremity    Acute on chronic combined systolic

## 2024-08-16 NOTE — CARE COORDINATION
Discharge order noted for today. Orders received. Family will transport home. No needs further identified at this time. Case management remains available as needed.     Jacqueline Hyde BSN RN  Case Management  [phone]

## 2024-08-16 NOTE — CARE COORDINATION
08/16/24 1226   IMM Letter   IMM Letter given to Patient/Family/Significant other/Guardian/POA/by: Jacqueline Christianson RN   Observation Status Letter date given: 08/16/24   Observation Status Letter time given: 1206       MOON letter given to the pt, copy in chart.    Jacqueline Christianson BSN RN  Case Management  [phone]

## 2024-08-16 NOTE — PROGRESS NOTES
Spiritual Health Assessment/Progress Note  [facility]    Spiritual/Emotional Needs,  ,  ,      Name: Marly Maravilla MRN: [id number]    Age: 84 y.o.     Sex: female   Language: English   Jew: Seventh Day Buddhism   Lower GI bleed     Date: 8/16/2024            Total Time Calculated: 7 min              Spiritual Assessment began in [address] MEDICAL        Referral/Consult From: Multi-disciplinary team   Encounter Overview/Reason: Spiritual/Emotional Needs  Service Provided For: Patient    Alexia, Belief, Meaning:   Patient has beliefs or practices that help with coping during difficult times  Family/Friends No family/friends present      Importance and Influence:  Patient has spiritual/personal beliefs that influence decisions regarding their health  Family/Friends no family/friends present    Community:  Patient feels well-supported. Support system includes: Children and Extended family  Family/Friends no family/friends present    Assessment and Plan of Care:     Patient Interventions include: Affirmed coping skills/support systems  Family/Friends Interventions include: no family/friends present    Patient Plan of Care: Spiritual Care available upon further referral  Family/Friends Plan of Care: Spiritual Care available upon further referral    Electronically signed by REECE Santizo on 8/16/2024 at 1:51 PM

## 2024-08-16 NOTE — PROGRESS NOTES
OCCUPATIONAL THERAPY EVALUATION/DISCHARGE    Patient: Marly Maravilla (84 y.o. female)  Date: 8/16/2024  Primary Diagnosis: Lower GI bleed [K92.2]  Precautions: General Precautions, Fall Risk  PLOF: Pt was independent with self-care and uses a cane for functional mobility.      ASSESSMENT AND RECOMMENDATIONS:  Based on the objective data described below, pt presents with no deficits that impede pt function with ADLs, functional transfers, and functional mobility in preparation for selfcare tasks. Patient independent tailor sitting to doff/don BLE socks and for donning gown onto backside while standing. At this time pt with no further concerns from selfcare standpoint as pt has good family support prn. Pt left all needs met and call bell in reach.      Maximum therapeutic gains met at current level of care and patient will be discharged from occupational therapy at this time.    Further Equipment Recommendations for Discharge: Pt has all DME    AMPAC: AM-PAC Inpatient Daily Activity Raw Score: 24    At this time and based on an AM-PAC score, no further OT is recommended upon discharge.  Recommend patient returns to prior setting with prior services.    This AMPA score should be considered in conjunction with interdisciplinary team recommendations to determine the most appropriate discharge setting. Patient's social support, diagnosis, medical stability, and prior level of function should also be taken into consideration.     SUBJECTIVE:   Patient stated “My granddaughter is my CNA, she helps me with washing up if I need it”    OBJECTIVE DATA SUMMARY:     Past Medical History:   Diagnosis Date    Allergic rhinitis     Arthritis     knee - pending surg    Asthma     CHF (congestive heart failure) (HCC)     Chronic obstructive pulmonary disease (HCC)     mild per pulmonary notes    Diverticulitis large intestine     GERD (gastroesophageal reflux disease)     GI bleed 11/09/2022    Hypertension     Thromboembolus  Independent  UE Bathing: Independent  LE Bathing: Modified independent   UE Dressing: Independent  LE Dressing: Modified independent   Toileting: Modified independent     Pain:  Intensity Pre-treatment: 0/10   Intensity Post-treatment: 0/10    Activity Tolerance:   Activity Tolerance: Patient Tolerated treatment well  Please refer to the flowsheet for vital signs taken during this treatment.    After treatment:   [x] Patient left in no apparent distress sitting up in chair  [] Patient left in no apparent distress in bed  [x] Call bell left within reach  [x] Nursing notified  [] Caregiver present  [] Bed alarm activated    COMMUNICATION/EDUCATION:   Patient Education  Education Given To: Patient  Education Provided: Role of Therapy;Plan of Care;Fall Prevention Strategies  Education Method: Demonstration;Verbal;Teach Back  Barriers to Learning: None  Education Outcome: Verbalized understanding;Demonstrated understanding    Thank you for this referral.  Jose Glaser OTR/L  Minutes: 16    Eval Complexity: Decision Making: Low Complexity

## 2024-08-16 NOTE — DISCHARGE INSTRUCTIONS
DISCHARGE SUMMARY from Nurse    PATIENT INSTRUCTIONS:    After general anesthesia or intravenous sedation, for 24 hours or while taking prescription Narcotics:  Limit your activities  Do not drive and operate hazardous machinery  Do not make important personal or business decisions  Do  not drink alcoholic beverages  If you have not urinated within 8 hours after discharge, please contact your surgeon on call.    Report the following to your surgeon:  Excessive pain, swelling, redness or odor of or around the surgical area  Temperature over 100.5  Nausea and vomiting lasting longer than 4 hours or if unable to take medications  Any signs of decreased circulation or nerve impairment to extremity: change in color, persistent  numbness, tingling, coldness or increase pain  Any questions    What to do at Home:  Recommended activity: activity as tolerated,     If you experience any of the following symptoms chest pain, shortness of breath, fever, any noticeable bleeding, pain unrelieved by medication, please follow up with Dr Faria.    *  Please give a list of your current medications to your Primary Care Provider.    *  Please update this list whenever your medications are discontinued, doses are      changed, or new medications (including over-the-counter products) are added.    *  Please carry medication information at all times in case of emergency situations.    These are general instructions for a healthy lifestyle:    No smoking/ No tobacco products/ Avoid exposure to second hand smoke  Surgeon General's Warning:  Quitting smoking now greatly reduces serious risk to your health.    Obesity, smoking, and sedentary lifestyle greatly increases your risk for illness    A healthy diet, regular physical exercise & weight monitoring are important for maintaining a healthy lifestyle    You may be retaining fluid if you have a history of heart failure or if you experience any of the following symptoms:  Weight gain of  {PATIENT PARENT GUARDIAN:83273}.  The {PATIENT PARENT GUARDIAN:66188} verbalized understanding.  Discharge medications reviewed with the {Dishcarge meds reviewed with:59794} and appropriate educational materials and side effects teaching were provided.  ___________________________________________________________________________________________________________________________________    Discharge Instructions    Patient: Marly Maravilla MRN: [id number]  CSN: [id number]    YOB: 1940  Age: 84 y.o.  Sex: female    DOA: 8/14/2024       DIET:  cardiac diet    ACTIVITY: activity as tolerated    Okay to resume outpatient physical therapy/Occupational Therapy      ADDITIONAL INFORMATION: If you experience any of the following symptoms but not limited to Fever, chills, nausea, vomiting, diarrhea, change in mentation, falling, bleeding, shortness of breath, chest pain, please call your primary care physician or return to the emergency room if you cannot get hold of your doctor:     FOLLOW UP CARE:   Follow-up Information     Nabor Delacruz MD. Schedule an appointment as soon as possible for   a visit in 1 week(s).    Specialty: Internal Medicine  Contact information:  [address] [facility]  [address] [address] [phone]                       Randi Castillo MD  8/16/2024 11:45 AM

## 2024-08-16 NOTE — PROGRESS NOTES
Completed discharge to patient. Provided opportunity to ask  question and answered to patient satisfaction.

## 2024-10-21 ENCOUNTER — HOSPITAL ENCOUNTER (OUTPATIENT)
Age: 84
Discharge: HOME | End: 2024-10-24

## 2024-10-21 LAB — LABCORP DRAW FEE: NORMAL

## 2024-10-21 PROCEDURE — 99001 SPECIMEN HANDLING PT-LAB: CPT

## 2025-02-18 ENCOUNTER — APPOINTMENT (OUTPATIENT)
Age: 85
End: 2025-02-18
Payer: MEDICARE

## 2025-02-18 ENCOUNTER — HOSPITAL ENCOUNTER (EMERGENCY)
Age: 85
Discharge: HOME | End: 2025-02-18
Attending: EMERGENCY MEDICINE
Payer: MEDICARE

## 2025-02-18 VITALS
WEIGHT: 138.8 LBS | HEIGHT: 62 IN | SYSTOLIC BLOOD PRESSURE: 158 MMHG | RESPIRATION RATE: 19 BRPM | OXYGEN SATURATION: 99 % | TEMPERATURE: 98.1 F | BODY MASS INDEX: 25.54 KG/M2 | HEART RATE: 78 BPM | DIASTOLIC BLOOD PRESSURE: 75 MMHG

## 2025-02-18 DIAGNOSIS — K57.90 DIVERTICULOSIS: Primary | ICD-10-CM

## 2025-02-18 LAB
ABO + RH BLD: NORMAL
ALBUMIN SERPL-MCNC: 3.3 G/DL (ref 3.4–5)
ALBUMIN/GLOB SERPL: 0.7 (ref 0.8–1.7)
ALP SERPL-CCNC: 107 U/L (ref 45–117)
ALT SERPL-CCNC: 12 U/L (ref 13–56)
ANION GAP SERPL CALC-SCNC: 5 MMOL/L (ref 3–18)
AST SERPL W P-5'-P-CCNC: 12 U/L (ref 10–38)
BASOPHILS # BLD: 0.09 K/UL (ref 0–0.1)
BASOPHILS NFR BLD: 1.1 % (ref 0–2)
BILIRUB SERPL-MCNC: 0.5 MG/DL (ref 0.2–1)
BLOOD GROUP ANTIBODIES SERPL: NEGATIVE
BUN SERPL-MCNC: 16 MG/DL (ref 7–18)
BUN/CREAT SERPL: 10 (ref 12–20)
CA-I BLD-MCNC: 8.9 MG/DL (ref 8.5–10.1)
CHLORIDE SERPL-SCNC: 107 MMOL/L (ref 100–111)
CO2 SERPL-SCNC: 26 MMOL/L (ref 21–32)
COLLECT DATE STL: NORMAL
CREAT SERPL-MCNC: 1.54 MG/DL (ref 0.6–1.3)
DIFFERENTIAL METHOD BLD: ABNORMAL
EOSINOPHIL # BLD: 0.68 K/UL (ref 0–0.4)
EOSINOPHIL NFR BLD: 8.7 % (ref 0–5)
ERYTHROCYTE [DISTWIDTH] IN BLOOD BY AUTOMATED COUNT: 16.3 % (ref 11.6–14.5)
GLOBULIN SER CALC-MCNC: 4.6 G/DL (ref 2–4)
GLUCOSE SERPL-MCNC: 85 MG/DL (ref 74–99)
HCT VFR BLD AUTO: 37.7 % (ref 35–45)
HEMOCCULT SP1 STL QL: POSITIVE
HGB BLD-MCNC: 12.1 G/DL (ref 12–16)
IMM GRANULOCYTES # BLD AUTO: 0.02 K/UL (ref 0–0.04)
IMM GRANULOCYTES NFR BLD AUTO: 0.3 % (ref 0–0.5)
INR PPP: 1 (ref 0.9–1.1)
LYMPHOCYTES # BLD: 1.81 K/UL (ref 0.9–3.6)
LYMPHOCYTES NFR BLD: 23.1 % (ref 21–52)
MCH RBC QN AUTO: 29.7 PG (ref 24–34)
MCHC RBC AUTO-ENTMCNC: 32.1 G/DL (ref 31–37)
MCV RBC AUTO: 92.4 FL (ref 78–100)
MONOCYTES # BLD: 0.76 K/UL (ref 0.05–1.2)
MONOCYTES NFR BLD: 9.7 % (ref 3–10)
NEUTS SEG # BLD: 4.49 K/UL (ref 1.8–8)
NEUTS SEG NFR BLD: 57.1 % (ref 40–73)
NRBC # BLD: 0 K/UL (ref 0–0.01)
NRBC BLD-RTO: 0 PER 100 WBC
PLATELET # BLD AUTO: 275 K/UL (ref 135–420)
PMV BLD AUTO: 10.1 FL (ref 9.2–11.8)
POTASSIUM SERPL-SCNC: 4.6 MMOL/L (ref 3.5–5.5)
PROT SERPL-MCNC: 7.9 G/DL (ref 6.4–8.2)
PROTHROMBIN TIME: 13.8 SEC (ref 11.9–14.9)
RBC # BLD AUTO: 4.08 M/UL (ref 4.2–5.3)
SODIUM SERPL-SCNC: 138 MMOL/L (ref 136–145)
SPECIMEN EXP DATE BLD: NORMAL
WBC # BLD AUTO: 7.9 K/UL (ref 4.6–13.2)

## 2025-02-18 PROCEDURE — 36415 COLL VENOUS BLD VENIPUNCTURE: CPT

## 2025-02-18 PROCEDURE — 6360000002 HC RX W HCPCS

## 2025-02-18 PROCEDURE — 99284 EMERGENCY DEPT VISIT MOD MDM: CPT

## 2025-02-18 PROCEDURE — 6370000000 HC RX 637 (ALT 250 FOR IP): Performed by: EMERGENCY MEDICINE

## 2025-02-18 PROCEDURE — 82272 OCCULT BLD FECES 1-3 TESTS: CPT

## 2025-02-18 PROCEDURE — 86850 RBC ANTIBODY SCREEN: CPT

## 2025-02-18 PROCEDURE — 71045 X-RAY EXAM CHEST 1 VIEW: CPT

## 2025-02-18 PROCEDURE — 74176 CT ABD & PELVIS W/O CONTRAST: CPT

## 2025-02-18 PROCEDURE — 94664 DEMO&/EVAL PT USE INHALER: CPT

## 2025-02-18 PROCEDURE — 96374 THER/PROPH/DIAG INJ IV PUSH: CPT

## 2025-02-18 PROCEDURE — 85610 PROTHROMBIN TIME: CPT

## 2025-02-18 PROCEDURE — 86901 BLOOD TYPING SEROLOGIC RH(D): CPT

## 2025-02-18 PROCEDURE — 2580000003 HC RX 258: Performed by: EMERGENCY MEDICINE

## 2025-02-18 PROCEDURE — 80053 COMPREHEN METABOLIC PANEL: CPT

## 2025-02-18 PROCEDURE — 94762 N-INVAS EAR/PLS OXIMTRY CONT: CPT

## 2025-02-18 PROCEDURE — 86900 BLOOD TYPING SEROLOGIC ABO: CPT

## 2025-02-18 PROCEDURE — 6360000002 HC RX W HCPCS: Performed by: EMERGENCY MEDICINE

## 2025-02-18 PROCEDURE — 85025 COMPLETE CBC W/AUTO DIFF WBC: CPT

## 2025-02-18 PROCEDURE — 94640 AIRWAY INHALATION TREATMENT: CPT

## 2025-02-18 RX ORDER — AMOXICILLIN AND CLAVULANATE POTASSIUM 500; 125 MG/1; MG/1
1 TABLET, FILM COATED ORAL 2 TIMES DAILY
Qty: 14 TABLET | Refills: 0 | Status: SHIPPED | OUTPATIENT
Start: 2025-02-18 | End: 2025-02-25

## 2025-02-18 RX ORDER — MORPHINE SULFATE 2 MG/ML
2 INJECTION, SOLUTION INTRAMUSCULAR; INTRAVENOUS
Status: DISCONTINUED | OUTPATIENT
Start: 2025-02-18 | End: 2025-02-18 | Stop reason: HOSPADM

## 2025-02-18 RX ORDER — PREDNISONE 20 MG/1
20 TABLET ORAL DAILY
Qty: 3 TABLET | Refills: 0 | Status: SHIPPED | OUTPATIENT
Start: 2025-02-18 | End: 2025-02-21

## 2025-02-18 RX ORDER — PANTOPRAZOLE SODIUM 40 MG/10ML
40 INJECTION, POWDER, LYOPHILIZED, FOR SOLUTION INTRAVENOUS ONCE
Status: COMPLETED | OUTPATIENT
Start: 2025-02-18 | End: 2025-02-18

## 2025-02-18 RX ORDER — ALBUTEROL SULFATE 0.83 MG/ML
SOLUTION RESPIRATORY (INHALATION)
Status: COMPLETED
Start: 2025-02-18 | End: 2025-02-18

## 2025-02-18 RX ORDER — PREDNISONE 20 MG/1
20 TABLET ORAL
Status: COMPLETED | OUTPATIENT
Start: 2025-02-18 | End: 2025-02-18

## 2025-02-18 RX ORDER — 0.9 % SODIUM CHLORIDE 0.9 %
500 INTRAVENOUS SOLUTION INTRAVENOUS ONCE
Status: COMPLETED | OUTPATIENT
Start: 2025-02-18 | End: 2025-02-18

## 2025-02-18 RX ORDER — IPRATROPIUM BROMIDE AND ALBUTEROL SULFATE 2.5; .5 MG/3ML; MG/3ML
1 SOLUTION RESPIRATORY (INHALATION)
Status: COMPLETED | OUTPATIENT
Start: 2025-02-18 | End: 2025-02-18

## 2025-02-18 RX ADMIN — PREDNISONE 20 MG: 20 TABLET ORAL at 17:49

## 2025-02-18 RX ADMIN — SODIUM CHLORIDE 500 ML: 9 INJECTION, SOLUTION INTRAVENOUS at 14:21

## 2025-02-18 RX ADMIN — IPRATROPIUM BROMIDE AND ALBUTEROL SULFATE 1 DOSE: 2.5; .5 SOLUTION RESPIRATORY (INHALATION) at 13:06

## 2025-02-18 RX ADMIN — PANTOPRAZOLE SODIUM 40 MG: 40 INJECTION, POWDER, FOR SOLUTION INTRAVENOUS at 13:01

## 2025-02-18 RX ADMIN — AMOXICILLIN AND CLAVULANATE POTASSIUM 1 TABLET: 875; 125 TABLET, FILM COATED ORAL at 17:49

## 2025-02-18 RX ADMIN — ALBUTEROL SULFATE 2.5 MG: 2.5 SOLUTION RESPIRATORY (INHALATION) at 16:27

## 2025-02-18 ASSESSMENT — PAIN DESCRIPTION - DESCRIPTORS
DESCRIPTORS: SORE
DESCRIPTORS: SORE

## 2025-02-18 ASSESSMENT — PAIN DESCRIPTION - ORIENTATION: ORIENTATION: LOWER

## 2025-02-18 ASSESSMENT — PAIN - FUNCTIONAL ASSESSMENT: PAIN_FUNCTIONAL_ASSESSMENT: 0-10

## 2025-02-18 ASSESSMENT — PAIN SCALES - GENERAL
PAINLEVEL_OUTOF10: 4
PAINLEVEL_OUTOF10: 3

## 2025-02-18 ASSESSMENT — PAIN DESCRIPTION - LOCATION
LOCATION: ABDOMEN
LOCATION: ABDOMEN

## 2025-02-18 NOTE — DISCHARGE INSTRUCTIONS
I suspect your bleeding is from a diverticulosis.  This can have bleeding intermittently.  I would recommend for right now you increase your Benefiber to 2-3 times per day to ensure you are having bowel movements consistently.  Your CT showed evidence of some constipation as well as some wall thickening of the colon which could indicate diverticulosis or diverticulitis.    We discussed admitting you are transferring you as well as repeating the hemoglobin and I understand that you preferred to be discharged.  This is on the condition that you agree to return if your symptoms worsen or change such as more frequent bloody stools passing clots any black stools at all, dizziness, lightheadedness, passing out or any other symptoms that concern you please return promptly or going to your nearest hospital emergency room.    I sent a 4-day course of prednisone, which is to help with your COPD.  You can continue doing your home and inhaler treatments.  I also sent a course of antibiotics with Augmentin to take 2 times per day and this is to treat a potential diverticulitis.  As we discussed if you have further bleeding or if you have any other worsening pain or other symptoms at any time you must return immediately        Thank you for choosing our Emergency Department for your care.  It is our privilege to care for you in your time of need.  In the next several days, you may receive a survey via email or mailed to your home about your experience with our team.  We would greatly appreciate you taking a few minutes to complete the survey, as we use this information to learn what we have done well and what we could be doing better. Thank you for trusting us with your care!    Below you will find a list of your tests from today's visit.   Labs and Radiology Studies  Recent Results (from the past 12 hour(s))   TYPE AND SCREEN    Collection Time: 02/18/25 12:30 PM   Result Value Ref Range    Crossmatch expiration date

## 2025-02-18 NOTE — ED PROVIDER NOTES
[facility] EMERGENCY DEPT  EMERGENCY DEPARTMENT HISTORY AND PHYSICAL EXAM      Date: 2/18/2025  Patient Name: Marly Maravilla  MRN: [id number]  YOB: 1940  Date of evaluation: 2/18/2025  Provider: Greyson Velez DO   Note Started: 1:00 PM EST 2/18/25    HISTORY OF PRESENT ILLNESS     Chief Complaint   Patient presents with    Rectal Bleeding       History was provided by: Patient and Adult Child    HPI:Patient is an 84-year-old female who has noted past medical history of diverticulosis, GI bleeding, CHF, lower extremity edema, thyroid disease, COPD, who presents with a chief complaint of hematochezia.  She said she noticed it last evening, she says that it became heavy and she had to change her depends 3 times overnight and again this morning.  She did not notice any clots as far she can tell.  She complains of generalized abdominal tenderness and discomfort.  It seems to be worse on the left side.  She denies taking any blood thinners.  She denies any fevers or chills, patient notably wheezing and coughing throughout the exam, reportedly cough is her baseline but she would like a breathing treatment patient.  Patient complains also some generalized fatigue          PAST MEDICAL HISTORY   Past Medical History:  Past Medical History:   Diagnosis Date    Allergic rhinitis     Arthritis     knee - pending surg    Asthma     CHF (congestive heart failure) (HCC)     Chronic obstructive pulmonary disease (HCC)     mild per pulmonary notes    Diverticulitis large intestine     GERD (gastroesophageal reflux disease)     GI bleed 11/09/2022    Hypertension     Thromboembolus (HCC)     many years ago    Thyroid disease     cyst on thyroid       Past Surgical History:  Past Surgical History:   Procedure Laterality Date    APPENDECTOMY  age 20    COLONOSCOPY N/A 4/3/2023    COLONOSCOPY performed by Chano Middleton MD at [facility] ENDOSCOPY    HYSTERECTOMY (CERVIX STATUS UNKNOWN)  age 41    MI UNLISTED PROCEDURE ABDOMEN PERITONEUM &

## 2025-02-18 NOTE — ED TRIAGE NOTES
Patient states hx of diverticulitis, reports rectal bleeding since last night. She states she changed her soaked attends 3 times last night and once this morning, bright red blood with some mucous and small brown balls of stool. Reports feeling weak.

## 2025-02-21 ENCOUNTER — TELEPHONE (OUTPATIENT)
Age: 85
End: 2025-02-21

## 2025-02-21 NOTE — TELEPHONE ENCOUNTER
----- Message from Dr. Sebastian De Jesus MD sent at 2/19/2025  9:36 AM EST -----  Chart reviewed.  Patient referred from emergency room for rectal bleeding.  Hemoglobin hematocrit are normal.  Patient underwent colonoscopy in 2023 by GI elsewhere that showed pandiverticulosis and small internal hemorrhoids.  The patient has multiple medical problems including cardiomyopathy, congestive heart failure, COPD, history of MI, reduced ejection fraction 26%.    This patient is too high risk to be evaluated at our institution for any endoscopy or anesthesia.    I recommend she return to see her previous gastroenterologist.  I suspect watchful waiting would be the appropriate action since we know which she has and its either diverticular or rectal bleeding but not causing any hemodynamic instability or even anemia.  ----- Message -----  From: Greyson Velez DO  Sent: 2/19/2025   6:14 AM EST  To: Sebastian De Jesus MD

## 2025-03-20 ENCOUNTER — HOSPITAL ENCOUNTER (EMERGENCY)
Age: 85
Discharge: HOME | End: 2025-03-20
Attending: FAMILY MEDICINE
Payer: MEDICARE

## 2025-03-20 ENCOUNTER — APPOINTMENT (OUTPATIENT)
Age: 85
End: 2025-03-20
Payer: MEDICARE

## 2025-03-20 VITALS
OXYGEN SATURATION: 97 % | BODY MASS INDEX: 25.58 KG/M2 | HEIGHT: 62 IN | SYSTOLIC BLOOD PRESSURE: 140 MMHG | HEART RATE: 64 BPM | RESPIRATION RATE: 18 BRPM | TEMPERATURE: 97.9 F | DIASTOLIC BLOOD PRESSURE: 70 MMHG | WEIGHT: 139 LBS

## 2025-03-20 DIAGNOSIS — R05.9 COUGH, UNSPECIFIED TYPE: ICD-10-CM

## 2025-03-20 DIAGNOSIS — K59.00 CONSTIPATION, UNSPECIFIED CONSTIPATION TYPE: Primary | ICD-10-CM

## 2025-03-20 DIAGNOSIS — I10 HYPERTENSION, UNSPECIFIED TYPE: ICD-10-CM

## 2025-03-20 PROCEDURE — 71045 X-RAY EXAM CHEST 1 VIEW: CPT

## 2025-03-20 PROCEDURE — 6370000000 HC RX 637 (ALT 250 FOR IP): Performed by: FAMILY MEDICINE

## 2025-03-20 PROCEDURE — 99283 EMERGENCY DEPT VISIT LOW MDM: CPT

## 2025-03-20 PROCEDURE — 74018 RADEX ABDOMEN 1 VIEW: CPT

## 2025-03-20 RX ORDER — TRIAMCINOLONE ACETONIDE 1 MG/G
CREAM TOPICAL
COMMUNITY
Start: 2025-01-28

## 2025-03-20 RX ORDER — BENZONATATE 100 MG/1
100 CAPSULE ORAL 3 TIMES DAILY PRN
Qty: 30 CAPSULE | Refills: 0 | Status: SHIPPED | OUTPATIENT
Start: 2025-03-20 | End: 2025-03-30

## 2025-03-20 RX ORDER — BENZONATATE 100 MG/1
100 CAPSULE ORAL
Status: COMPLETED | OUTPATIENT
Start: 2025-03-20 | End: 2025-03-20

## 2025-03-20 RX ORDER — POLYETHYLENE GLYCOL 3350 17 G/17G
17 POWDER, FOR SOLUTION ORAL 2 TIMES DAILY PRN
Qty: 510 G | Refills: 0 | Status: SHIPPED | OUTPATIENT
Start: 2025-03-20 | End: 2025-04-19

## 2025-03-20 RX ORDER — ACETAMINOPHEN 325 MG/1
650 TABLET ORAL
Status: COMPLETED | OUTPATIENT
Start: 2025-03-20 | End: 2025-03-20

## 2025-03-20 RX ORDER — MAGNESIUM HYDROXIDE/ALUMINUM HYDROXICE/SIMETHICONE 120; 1200; 1200 MG/30ML; MG/30ML; MG/30ML
30 SUSPENSION ORAL
Status: COMPLETED | OUTPATIENT
Start: 2025-03-20 | End: 2025-03-20

## 2025-03-20 RX ORDER — DOXYCYCLINE HYCLATE 100 MG
100 TABLET ORAL DAILY
COMMUNITY

## 2025-03-20 RX ADMIN — ALUMINUM HYDROXIDE, MAGNESIUM HYDROXIDE, AND SIMETHICONE 30 ML: 200; 200; 20 SUSPENSION ORAL at 22:51

## 2025-03-20 RX ADMIN — ACETAMINOPHEN 650 MG: 325 TABLET ORAL at 21:20

## 2025-03-20 RX ADMIN — BENZONATATE 100 MG: 100 CAPSULE ORAL at 21:20

## 2025-03-20 ASSESSMENT — PAIN SCALES - GENERAL
PAINLEVEL_OUTOF10: 4
PAINLEVEL_OUTOF10: 4

## 2025-03-20 ASSESSMENT — PAIN DESCRIPTION - LOCATION
LOCATION: HEAD
LOCATION: HEAD

## 2025-03-20 ASSESSMENT — PAIN DESCRIPTION - DESCRIPTORS
DESCRIPTORS: ACHING
DESCRIPTORS: ACHING

## 2025-03-20 ASSESSMENT — PAIN - FUNCTIONAL ASSESSMENT: PAIN_FUNCTIONAL_ASSESSMENT: 0-10

## 2025-03-21 ASSESSMENT — ENCOUNTER SYMPTOMS
VOMITING: 0
SHORTNESS OF BREATH: 0
DIARRHEA: 1
NAUSEA: 0
COUGH: 1
CONSTIPATION: 1

## 2025-03-21 NOTE — ED NOTES
I have reviewed discharge instructions with the patient and caregiver.  The patient and caregiver verbalized understanding. Patient instructed to follow up with PCP and encouraged to return to ER with any other concerns or emergent care needed. Patient verbalized understanding.

## 2025-03-21 NOTE — DISCHARGE INSTRUCTIONS
There was no evidence of pneumonia or volume overload on your chest x-ray.  Your abdominal x-ray showed constipation and gas.  You have been prescribed medication to help with the symptoms.  Your blood pressure was mildly elevated in the emergency department, but not high enough for us to give you additional medication or admit you to the hospital.  Follow-up with your primary doctor to discuss adjustment of your blood pressure medication.  Return to the emergency department for new or worsening symptoms.

## 2025-03-21 NOTE — ED TRIAGE NOTES
Patient arrived to room via wheelchair. Patient reports high blood pressure, headache, and cough. Patient reports she has been on antibiotics and has 2 days left. States she has had some diarrhea. Deep congested cough noted.

## 2025-03-21 NOTE — ED PROVIDER NOTES
[facility] EMERGENCY DEPARTMENT  EMERGENCY DEPARTMENT ENCOUNTER      Pt Name: Marly Maravilla  MRN: [id number]  Birthdate 1940  Date of evaluation: 3/20/2025  Provider: Rut Richmond,   10:40 PM    CHIEF COMPLAINT       Chief Complaint   Patient presents with    Hypertension    Cough         HISTORY OF PRESENT ILLNESS    Marly Maravilla is a 85 y.o. female who presents to the emergency department elevated blood pressure, headache, cough, constipation    Patient presents to the emergency department for cough, gas and constipation.  She also reports concerns that her blood pressure was elevated at home.  She had mild headache, for which she did not take any medication.  She states her blood pressure is typically 120s over 70s but this evening was 150s over 100s.  She became concerned and came to the ED.  She has a history of hypertension and anxiety.  She also has a history of COPD and CHF.  She recently saw her primary doctor for this cough and was prescribed azithromycin and doxycycline.  She is completed her azithromycin but is still taking her doxycycline.  Nothing makes her symptoms better or worse.  She is unsure if she is constipated but reports small amounts of loose stool as well as gas and increased flatulence since being on antibiotic.  She has not tried any medications for her GI symptoms.  She denies other associated symptoms    The history is provided by the patient, a relative and medical records.       Nursing Notes were reviewed.    REVIEW OF SYSTEMS       Review of Systems   Constitutional: Negative.    Respiratory:  Positive for cough. Negative for shortness of breath.    Cardiovascular: Negative.    Gastrointestinal:  Positive for constipation and diarrhea. Negative for nausea and vomiting.        Bloating, increased flatulence   Neurological:  Positive for headaches. Negative for dizziness, syncope, speech difficulty and light-headedness.   All other systems reviewed and

## 2025-06-10 ENCOUNTER — HOSPITAL ENCOUNTER (OUTPATIENT)
Age: 85
Discharge: HOME | End: 2025-06-13

## 2025-06-10 LAB — LABCORP DRAW FEE: NORMAL

## 2025-06-10 PROCEDURE — 99001 SPECIMEN HANDLING PT-LAB: CPT

## 2025-06-22 ENCOUNTER — HOSPITAL ENCOUNTER (EMERGENCY)
Age: 85
Discharge: HOME | End: 2025-06-22
Attending: FAMILY MEDICINE
Payer: MEDICARE

## 2025-06-22 VITALS
BODY MASS INDEX: 25.4 KG/M2 | OXYGEN SATURATION: 99 % | TEMPERATURE: 98.7 F | HEIGHT: 62 IN | DIASTOLIC BLOOD PRESSURE: 92 MMHG | WEIGHT: 138 LBS | SYSTOLIC BLOOD PRESSURE: 152 MMHG | RESPIRATION RATE: 13 BRPM | HEART RATE: 85 BPM

## 2025-06-22 DIAGNOSIS — E86.0 DEHYDRATION: ICD-10-CM

## 2025-06-22 DIAGNOSIS — G47.00 INSOMNIA, UNSPECIFIED TYPE: ICD-10-CM

## 2025-06-22 DIAGNOSIS — R42 DIZZINESS: ICD-10-CM

## 2025-06-22 DIAGNOSIS — R53.1 GENERALIZED WEAKNESS: Primary | ICD-10-CM

## 2025-06-22 LAB
ABO + RH BLD: NORMAL
ALBUMIN SERPL-MCNC: 2.9 G/DL (ref 3.4–5)
ALBUMIN/GLOB SERPL: 0.6
ALP SERPL-CCNC: 63 U/L (ref 45–117)
ALT SERPL-CCNC: 13 U/L (ref 10–35)
ANION GAP SERPL CALC-SCNC: 10 MMOL/L
ANION GAP SERPL CALC-SCNC: 9 MMOL/L
APPEARANCE UR: CLEAR
AST SERPL W P-5'-P-CCNC: 89 U/L (ref 10–38)
BASOPHILS # BLD: 0.08 K/UL (ref 0–0.1)
BASOPHILS NFR BLD: 0.8 % (ref 0–2)
BILIRUB SERPL-MCNC: 0.3 MG/DL (ref 0.2–1)
BILIRUB UR QL: NEGATIVE
BNP SERPL-MCNC: 4429 PG/ML (ref 36–1800)
BUN SERPL-MCNC: 15 MG/DL (ref 6–23)
BUN SERPL-MCNC: 15 MG/DL (ref 6–23)
BUN/CREAT SERPL: 11
BUN/CREAT SERPL: 12
CA-I BLD-MCNC: 9.1 MG/DL (ref 8.5–10.1)
CA-I BLD-MCNC: 9.2 MG/DL (ref 8.5–10.1)
CHLORIDE SERPL-SCNC: 102 MMOL/L (ref 98–107)
CHLORIDE SERPL-SCNC: 104 MMOL/L (ref 98–107)
CO2 SERPL-SCNC: 25 MMOL/L (ref 21–32)
CO2 SERPL-SCNC: 25 MMOL/L (ref 21–32)
COLOR UR: YELLOW
CREAT SERPL-MCNC: 1.32 MG/DL (ref 0.6–1.3)
CREAT SERPL-MCNC: 1.33 MG/DL (ref 0.6–1.3)
DIFFERENTIAL METHOD BLD: ABNORMAL
EOSINOPHIL # BLD: 0.06 K/UL (ref 0–0.4)
EOSINOPHIL NFR BLD: 0.6 % (ref 0–5)
ERYTHROCYTE [DISTWIDTH] IN BLOOD BY AUTOMATED COUNT: 15.6 % (ref 11.6–14.5)
GLOBULIN SER CALC-MCNC: 5 G/DL
GLUCOSE SERPL-MCNC: 93 MG/DL (ref 74–108)
GLUCOSE SERPL-MCNC: 99 MG/DL (ref 74–108)
GLUCOSE UR STRIP.AUTO-MCNC: NEGATIVE MG/DL
HCT VFR BLD AUTO: 39.9 % (ref 35–45)
HGB BLD-MCNC: 12.4 G/DL (ref 12–16)
HGB UR QL STRIP: NEGATIVE
IMM GRANULOCYTES # BLD AUTO: 0.07 K/UL (ref 0–0.04)
IMM GRANULOCYTES NFR BLD AUTO: 0.7 % (ref 0–0.5)
KETONES UR QL STRIP.AUTO: NEGATIVE MG/DL
LEUKOCYTE ESTERASE UR QL STRIP.AUTO: NEGATIVE
LYMPHOCYTES # BLD: 3.03 K/UL (ref 0.9–3.6)
LYMPHOCYTES NFR BLD: 29.2 % (ref 21–52)
MCH RBC QN AUTO: 29.3 PG (ref 24–34)
MCHC RBC AUTO-ENTMCNC: 31.1 G/DL (ref 31–37)
MCV RBC AUTO: 94.3 FL (ref 78–100)
MONOCYTES # BLD: 1.15 K/UL (ref 0.05–1.2)
MONOCYTES NFR BLD: 11.1 % (ref 3–10)
NEUTS SEG # BLD: 5.98 K/UL (ref 1.8–8)
NEUTS SEG NFR BLD: 57.6 % (ref 40–73)
NITRITE UR QL STRIP.AUTO: NEGATIVE
NRBC # BLD: 0 K/UL (ref 0–0.01)
NRBC BLD-RTO: 0 PER 100 WBC
PH UR STRIP: 8 (ref 5–8)
PLATELET # BLD AUTO: 449 K/UL (ref 135–420)
PMV BLD AUTO: 9 FL (ref 9.2–11.8)
POTASSIUM SERPL-SCNC: 4 MMOL/L (ref 3.5–5.5)
POTASSIUM SERPL-SCNC: 7.4 MMOL/L (ref 3.5–5.5)
PROT SERPL-MCNC: 7.9 G/DL (ref 6.4–8.2)
PROT UR STRIP-MCNC: NEGATIVE MG/DL
RBC # BLD AUTO: 4.23 M/UL (ref 4.2–5.3)
SODIUM SERPL-SCNC: 136 MMOL/L (ref 136–145)
SODIUM SERPL-SCNC: 140 MMOL/L (ref 136–145)
SP GR UR REFRACTOMETRY: 1.01 (ref 1–1.03)
TROPONIN T SERPL HS-MCNC: 16.7 NG/L (ref 0–14)
TROPONIN T SERPL HS-MCNC: 22.3 NG/L (ref 0–14)
UROBILINOGEN UR QL STRIP.AUTO: 0.2 EU/DL (ref 0.2–1)
WBC # BLD AUTO: 10.4 K/UL (ref 4.6–13.2)

## 2025-06-22 PROCEDURE — 86901 BLOOD TYPING SEROLOGIC RH(D): CPT

## 2025-06-22 PROCEDURE — 85025 COMPLETE CBC W/AUTO DIFF WBC: CPT

## 2025-06-22 PROCEDURE — 80053 COMPREHEN METABOLIC PANEL: CPT

## 2025-06-22 PROCEDURE — 6360000002 HC RX W HCPCS: Performed by: FAMILY MEDICINE

## 2025-06-22 PROCEDURE — 84484 ASSAY OF TROPONIN QUANT: CPT

## 2025-06-22 PROCEDURE — 2580000003 HC RX 258: Performed by: FAMILY MEDICINE

## 2025-06-22 PROCEDURE — 93005 ELECTROCARDIOGRAM TRACING: CPT | Performed by: FAMILY MEDICINE

## 2025-06-22 PROCEDURE — 99284 EMERGENCY DEPT VISIT MOD MDM: CPT

## 2025-06-22 PROCEDURE — 83880 ASSAY OF NATRIURETIC PEPTIDE: CPT

## 2025-06-22 PROCEDURE — 96374 THER/PROPH/DIAG INJ IV PUSH: CPT

## 2025-06-22 PROCEDURE — 81003 URINALYSIS AUTO W/O SCOPE: CPT

## 2025-06-22 PROCEDURE — 96361 HYDRATE IV INFUSION ADD-ON: CPT

## 2025-06-22 PROCEDURE — 80048 BASIC METABOLIC PNL TOTAL CA: CPT

## 2025-06-22 PROCEDURE — 86900 BLOOD TYPING SEROLOGIC ABO: CPT

## 2025-06-22 RX ORDER — 0.9 % SODIUM CHLORIDE 0.9 %
500 INTRAVENOUS SOLUTION INTRAVENOUS ONCE
Status: COMPLETED | OUTPATIENT
Start: 2025-06-22 | End: 2025-06-22

## 2025-06-22 RX ORDER — DEXTROSE MONOHYDRATE 25 G/50ML
25 INJECTION, SOLUTION INTRAVENOUS
Status: DISCONTINUED | OUTPATIENT
Start: 2025-06-22 | End: 2025-06-22

## 2025-06-22 RX ORDER — MULTIVITAMIN,THERAPEUTIC
1 TABLET ORAL DAILY
COMMUNITY

## 2025-06-22 RX ORDER — ONDANSETRON 2 MG/ML
4 INJECTION INTRAMUSCULAR; INTRAVENOUS
Status: COMPLETED | OUTPATIENT
Start: 2025-06-22 | End: 2025-06-22

## 2025-06-22 RX ORDER — PREDNISONE 10 MG/1
TABLET ORAL
COMMUNITY
Start: 2025-04-29

## 2025-06-22 RX ORDER — ALBUTEROL SULFATE 0.83 MG/ML
2.5 SOLUTION RESPIRATORY (INHALATION)
Status: DISCONTINUED | OUTPATIENT
Start: 2025-06-22 | End: 2025-06-22

## 2025-06-22 RX ORDER — CALCIUM GLUCONATE 20 MG/ML
1000 INJECTION, SOLUTION INTRAVENOUS ONCE
Status: DISCONTINUED | OUTPATIENT
Start: 2025-06-22 | End: 2025-06-22

## 2025-06-22 RX ORDER — TRAZODONE HYDROCHLORIDE 50 MG/1
50 TABLET ORAL NIGHTLY PRN
COMMUNITY
Start: 2025-05-15

## 2025-06-22 RX ADMIN — SODIUM CHLORIDE 500 ML: 0.9 INJECTION, SOLUTION INTRAVENOUS at 15:18

## 2025-06-22 RX ADMIN — ONDANSETRON 4 MG: 2 INJECTION, SOLUTION INTRAMUSCULAR; INTRAVENOUS at 15:18

## 2025-06-22 ASSESSMENT — PAIN SCALES - GENERAL: PAINLEVEL_OUTOF10: 0

## 2025-06-22 ASSESSMENT — PAIN - FUNCTIONAL ASSESSMENT: PAIN_FUNCTIONAL_ASSESSMENT: 0-10

## 2025-06-22 NOTE — ED TRIAGE NOTES
Pt reports feeling weakened for several days, states her BP is going up and down, reports recent medication changes made by her pcp.

## 2025-06-22 NOTE — DISCHARGE INSTRUCTIONS
As we spoke I wanted to touch base with your primary care provider.  Your urine does not look like it is a urinary tract infection.  I do want you to avoid drinking any caffeinated beverages such as Coca-Cola after 3 PM.  Also recommend that you take your anxiety medicine twice a day this may be the reason why you are not sleeping.  Do not just take it in the morning time but take it at nighttime.  Return to the emergency department start having chest pain shortness of breath.  I do believe that you are actually slightly dehydrated as well also make sure that you drink plenty of fluids.

## 2025-06-22 NOTE — ED PROVIDER NOTES
gluconate 324 (38 Fe) MG tablet  Commonly known as: FERGON     fluticasone 50 MCG/ACT nasal spray  Commonly known as: FLONASE     fluticasone-salmeterol 250-50 MCG/ACT Aepb diskus inhaler  Commonly known as: ADVAIR     furosemide 20 MG tablet  Commonly known as: Lasix  Take 1 tablet by mouth 2 times daily     ipratropium 0.06 % nasal spray  Commonly known as: ATROVENT     Oncovite Tabs     ondansetron 4 MG tablet  Commonly known as: ZOFRAN     pantoprazole 40 MG tablet  Commonly known as: PROTONIX     potassium chloride 20 MEQ packet  Commonly known as: KLOR-CON     predniSONE 10 MG tablet  Commonly known as: DELTASONE     pregabalin 75 MG capsule  Commonly known as: LYRICA     spironolactone 25 MG tablet  Commonly known as: ALDACTONE     traMADol 50 MG tablet  Commonly known as: ULTRAM     traZODone 50 MG tablet  Commonly known as: DESYREL     Trelegy Ellipta 100-62.5-25 MCG/ACT Aepb inhaler  Generic drug: fluticasone-umeclidin-vilant     triamcinolone 0.1 % cream  Commonly known as: KENALOG     Ventolin  (90 Base) MCG/ACT inhaler  Generic drug: albuterol sulfate HFA     vitamin D 125 MCG (5000 UT) Caps capsule  Commonly known as: CHOLECALCIFEROL                DISCONTINUED MEDICATIONS:  Current Discharge Medication List        STOP taking these medications       doxycycline hyclate (VIBRA-TABS) 100 MG tablet Comments:   Reason for Stopping:         azithromycin (ZITHROMAX) 250 MG tablet Comments:   Reason for Stopping:         Ferrous Fumarate 324 (106 Fe) MG TABS Comments:   Reason for Stopping:         gabapentin (NEURONTIN) 100 MG capsule Comments:   Reason for Stopping:               I am the Primary Clinician of Record. Denny Aguilar DO (electronically signed)    (Please note that parts of this dictation were completed with voice recognition software. Quite often unanticipated grammatical, syntax, homophones, and other interpretive errors are inadvertently transcribed by the computer software. Please

## 2025-06-23 LAB
EKG DIAGNOSIS: NORMAL
EKG Q-T INTERVAL: 380 MS
EKG QRS DURATION: 84 MS
EKG QTC CALCULATION (BAZETT): 462 MS
EKG R AXIS: 41 DEGREES
EKG T AXIS: 249 DEGREES
EKG VENTRICULAR RATE: 89 BPM

## 2025-08-11 ENCOUNTER — APPOINTMENT (OUTPATIENT)
Age: 85
End: 2025-08-11
Payer: MEDICARE

## 2025-08-11 ENCOUNTER — HOSPITAL ENCOUNTER (EMERGENCY)
Age: 85
Discharge: HOME | End: 2025-08-11
Attending: EMERGENCY MEDICINE
Payer: MEDICARE

## 2025-08-11 VITALS
OXYGEN SATURATION: 97 % | WEIGHT: 152 LBS | RESPIRATION RATE: 22 BRPM | TEMPERATURE: 98.3 F | BODY MASS INDEX: 27.97 KG/M2 | DIASTOLIC BLOOD PRESSURE: 78 MMHG | HEART RATE: 87 BPM | HEIGHT: 62 IN | SYSTOLIC BLOOD PRESSURE: 144 MMHG

## 2025-08-11 DIAGNOSIS — K57.32 DIVERTICULITIS OF COLON: ICD-10-CM

## 2025-08-11 DIAGNOSIS — R06.00 DYSPNEA, UNSPECIFIED TYPE: Primary | ICD-10-CM

## 2025-08-11 DIAGNOSIS — J90 PLEURAL EFFUSION: ICD-10-CM

## 2025-08-11 LAB
ALBUMIN SERPL-MCNC: 2.7 G/DL (ref 3.4–5)
ALBUMIN/GLOB SERPL: 0.6
ALP SERPL-CCNC: 95 U/L (ref 45–117)
ALT SERPL-CCNC: 12 U/L (ref 10–35)
ANION GAP SERPL CALC-SCNC: 11 MMOL/L (ref 3–18)
AST SERPL W P-5'-P-CCNC: 25 U/L (ref 10–38)
BASOPHILS # BLD: 0.1 K/UL (ref 0–0.1)
BASOPHILS NFR BLD: 1.3 % (ref 0–2)
BILIRUB DIRECT SERPL-MCNC: 0.2 MG/DL (ref 0–0.2)
BILIRUB SERPL-MCNC: 0.5 MG/DL (ref 0.2–1)
BNP SERPL-MCNC: ABNORMAL PG/ML (ref 36–1800)
BUN SERPL-MCNC: 11 MG/DL (ref 6–23)
BUN/CREAT SERPL: 9
CA-I BLD-MCNC: 9.3 MG/DL (ref 8.5–10.1)
CHLORIDE SERPL-SCNC: 104 MMOL/L (ref 98–107)
CO2 SERPL-SCNC: 26 MMOL/L (ref 21–32)
CREAT SERPL-MCNC: 1.28 MG/DL (ref 0.6–1.3)
D DIMER PPP FEU-MCNC: 1.39 UG/ML(FEU)
DIFFERENTIAL METHOD BLD: ABNORMAL
EKG DIAGNOSIS: NORMAL
EKG Q-T INTERVAL: 398 MS
EKG QRS DURATION: 78 MS
EKG QTC CALCULATION (BAZETT): 486 MS
EKG R AXIS: 27 DEGREES
EKG T AXIS: 243 DEGREES
EKG VENTRICULAR RATE: 90 BPM
EOSINOPHIL # BLD: 0.45 K/UL (ref 0–0.4)
EOSINOPHIL NFR BLD: 5.6 % (ref 0–5)
ERYTHROCYTE [DISTWIDTH] IN BLOOD BY AUTOMATED COUNT: 13.8 % (ref 11.6–14.5)
GLOBULIN SER CALC-MCNC: 4.2 G/DL
GLUCOSE SERPL-MCNC: 99 MG/DL (ref 74–108)
HCT VFR BLD AUTO: 39.8 % (ref 35–45)
HGB BLD-MCNC: 12.6 G/DL (ref 12–16)
IMM GRANULOCYTES # BLD AUTO: 0.04 K/UL (ref 0–0.04)
IMM GRANULOCYTES NFR BLD AUTO: 0.5 % (ref 0–0.5)
LACTATE SERPL-SCNC: 1.6 MMOL/L (ref 0.4–2)
LIPASE SERPL-CCNC: 14 U/L (ref 13–75)
LYMPHOCYTES # BLD: 2.17 K/UL (ref 0.9–3.6)
LYMPHOCYTES NFR BLD: 27.2 % (ref 21–52)
MAGNESIUM SERPL-MCNC: 2.4 MG/DL (ref 1.6–2.6)
MCH RBC QN AUTO: 29.2 PG (ref 24–34)
MCHC RBC AUTO-ENTMCNC: 31.7 G/DL (ref 31–37)
MCV RBC AUTO: 92.1 FL (ref 78–100)
MONOCYTES # BLD: 0.68 K/UL (ref 0.05–1.2)
MONOCYTES NFR BLD: 8.5 % (ref 3–10)
NEUTS SEG # BLD: 4.54 K/UL (ref 1.8–8)
NEUTS SEG NFR BLD: 56.9 % (ref 40–73)
NRBC # BLD: 0 K/UL (ref 0–0.01)
NRBC BLD-RTO: 0 PER 100 WBC
PLATELET # BLD AUTO: 359 K/UL (ref 135–420)
PMV BLD AUTO: 10 FL (ref 9.2–11.8)
POTASSIUM SERPL-SCNC: 4.6 MMOL/L (ref 3.5–5.5)
PROT SERPL-MCNC: 6.9 G/DL (ref 6.4–8.2)
RBC # BLD AUTO: 4.32 M/UL (ref 4.2–5.3)
SODIUM SERPL-SCNC: 141 MMOL/L (ref 136–145)
TROPONIN T SERPL HS-MCNC: 22.3 NG/L (ref 0–14)
TROPONIN T SERPL HS-MCNC: 23.6 NG/L (ref 0–14)
WBC # BLD AUTO: 8 K/UL (ref 4.6–13.2)

## 2025-08-11 PROCEDURE — 83880 ASSAY OF NATRIURETIC PEPTIDE: CPT

## 2025-08-11 PROCEDURE — 71275 CT ANGIOGRAPHY CHEST: CPT

## 2025-08-11 PROCEDURE — 80048 BASIC METABOLIC PNL TOTAL CA: CPT

## 2025-08-11 PROCEDURE — 6360000002 HC RX W HCPCS: Performed by: EMERGENCY MEDICINE

## 2025-08-11 PROCEDURE — 83690 ASSAY OF LIPASE: CPT

## 2025-08-11 PROCEDURE — 93005 ELECTROCARDIOGRAM TRACING: CPT | Performed by: EMERGENCY MEDICINE

## 2025-08-11 PROCEDURE — 84484 ASSAY OF TROPONIN QUANT: CPT

## 2025-08-11 PROCEDURE — 6370000000 HC RX 637 (ALT 250 FOR IP): Performed by: EMERGENCY MEDICINE

## 2025-08-11 PROCEDURE — 85025 COMPLETE CBC W/AUTO DIFF WBC: CPT

## 2025-08-11 PROCEDURE — 74177 CT ABD & PELVIS W/CONTRAST: CPT

## 2025-08-11 PROCEDURE — 96374 THER/PROPH/DIAG INJ IV PUSH: CPT

## 2025-08-11 PROCEDURE — 83735 ASSAY OF MAGNESIUM: CPT

## 2025-08-11 PROCEDURE — 85379 FIBRIN DEGRADATION QUANT: CPT

## 2025-08-11 PROCEDURE — 83605 ASSAY OF LACTIC ACID: CPT

## 2025-08-11 PROCEDURE — 80076 HEPATIC FUNCTION PANEL: CPT

## 2025-08-11 PROCEDURE — 6360000004 HC RX CONTRAST MEDICATION: Performed by: EMERGENCY MEDICINE

## 2025-08-11 PROCEDURE — 99285 EMERGENCY DEPT VISIT HI MDM: CPT

## 2025-08-11 PROCEDURE — 71045 X-RAY EXAM CHEST 1 VIEW: CPT

## 2025-08-11 RX ORDER — FUROSEMIDE 10 MG/ML
20 INJECTION INTRAMUSCULAR; INTRAVENOUS
Status: COMPLETED | OUTPATIENT
Start: 2025-08-11 | End: 2025-08-11

## 2025-08-11 RX ORDER — FAMOTIDINE 20 MG/1
20 TABLET, FILM COATED ORAL EVERY OTHER DAY
Qty: 7 TABLET | Refills: 0 | Status: SHIPPED | OUTPATIENT
Start: 2025-08-11

## 2025-08-11 RX ORDER — IOPAMIDOL 755 MG/ML
100 INJECTION, SOLUTION INTRAVASCULAR
Status: COMPLETED | OUTPATIENT
Start: 2025-08-11 | End: 2025-08-11

## 2025-08-11 RX ORDER — AMOXICILLIN AND CLAVULANATE POTASSIUM 500; 125 MG/1; MG/1
1 TABLET, FILM COATED ORAL 2 TIMES DAILY
Qty: 14 TABLET | Refills: 0 | Status: SHIPPED | OUTPATIENT
Start: 2025-08-11 | End: 2025-08-18

## 2025-08-11 RX ADMIN — AMOXICILLIN AND CLAVULANATE POTASSIUM 1 TABLET: 875; 125 TABLET, FILM COATED ORAL at 16:38

## 2025-08-11 RX ADMIN — FUROSEMIDE 20 MG: 10 INJECTION, SOLUTION INTRAMUSCULAR; INTRAVENOUS at 16:38

## 2025-08-11 RX ADMIN — IOPAMIDOL 100 ML: 755 INJECTION, SOLUTION INTRAVENOUS at 15:00

## 2025-08-12 ENCOUNTER — TELEPHONE (OUTPATIENT)
Age: 85
End: 2025-08-12

## 2025-08-17 ENCOUNTER — APPOINTMENT (OUTPATIENT)
Age: 85
End: 2025-08-17
Payer: MEDICARE

## 2025-08-17 ENCOUNTER — HOSPITAL ENCOUNTER (OUTPATIENT)
Age: 85
Setting detail: OBSERVATION
Discharge: HOME | End: 2025-08-18
Attending: EMERGENCY MEDICINE | Admitting: INTERNAL MEDICINE
Payer: MEDICARE

## 2025-08-17 DIAGNOSIS — I42.9 CARDIOMYOPATHY, UNSPECIFIED TYPE (HCC): ICD-10-CM

## 2025-08-17 DIAGNOSIS — I48.91 ATRIAL FIBRILLATION WITH RAPID VENTRICULAR RESPONSE (HCC): ICD-10-CM

## 2025-08-17 DIAGNOSIS — I50.9 ACUTE ON CHRONIC CONGESTIVE HEART FAILURE, UNSPECIFIED HEART FAILURE TYPE (HCC): Primary | ICD-10-CM

## 2025-08-17 DIAGNOSIS — I50.43 ACUTE ON CHRONIC COMBINED SYSTOLIC AND DIASTOLIC CONGESTIVE HEART FAILURE (HCC): ICD-10-CM

## 2025-08-17 PROBLEM — J81.0 ACUTE PULMONARY EDEMA (HCC): Status: ACTIVE | Noted: 2025-08-17

## 2025-08-17 LAB
ALBUMIN SERPL-MCNC: 2.9 G/DL (ref 3.4–5)
ALBUMIN/GLOB SERPL: 0.6
ALP SERPL-CCNC: 83 U/L (ref 45–117)
ALT SERPL-CCNC: 9 U/L (ref 10–35)
ANION GAP SERPL CALC-SCNC: 13 MMOL/L (ref 3–18)
ARTERIAL PATENCY WRIST A: YES
AST SERPL W P-5'-P-CCNC: 28 U/L (ref 10–38)
BASE EXCESS BLDA CALC-SCNC: 2 MMOL/L (ref 0–3)
BASOPHILS # BLD: 0.05 K/UL (ref 0–0.1)
BASOPHILS NFR BLD: 0.5 % (ref 0–2)
BDY SITE: ABNORMAL
BILIRUB SERPL-MCNC: 0.5 MG/DL (ref 0.2–1)
BNP SERPL-MCNC: ABNORMAL PG/ML (ref 36–1800)
BUN SERPL-MCNC: 9 MG/DL (ref 6–23)
BUN/CREAT SERPL: 7
CA-I BLD-MCNC: 9.2 MG/DL (ref 8.5–10.1)
CHLORIDE SERPL-SCNC: 100 MMOL/L (ref 98–107)
CO2 SERPL-SCNC: 24 MMOL/L (ref 21–32)
COHGB MFR BLD: 0.7 % (ref 1–2)
CREAT SERPL-MCNC: 1.34 MG/DL (ref 0.6–1.3)
DIFFERENTIAL METHOD BLD: ABNORMAL
EOSINOPHIL # BLD: 0.76 K/UL (ref 0–0.4)
EOSINOPHIL NFR BLD: 7.3 % (ref 0–5)
ERYTHROCYTE [DISTWIDTH] IN BLOOD BY AUTOMATED COUNT: 13.8 % (ref 11.6–14.5)
GLOBULIN SER CALC-MCNC: 4.5 G/DL
GLUCOSE SERPL-MCNC: 119 MG/DL (ref 74–108)
HCO3 BLDA-SCNC: 26 MMOL/L (ref 22–26)
HCT VFR BLD AUTO: 38.3 % (ref 35–45)
HGB BLD-MCNC: 12.1 G/DL (ref 12–16)
IMM GRANULOCYTES # BLD AUTO: 0.04 K/UL (ref 0–0.04)
IMM GRANULOCYTES NFR BLD AUTO: 0.4 % (ref 0–0.5)
LYMPHOCYTES # BLD: 2.35 K/UL (ref 0.9–3.6)
LYMPHOCYTES NFR BLD: 22.6 % (ref 21–52)
MAGNESIUM SERPL-MCNC: 2.1 MG/DL (ref 1.6–2.6)
MCH RBC QN AUTO: 28.8 PG (ref 24–34)
MCHC RBC AUTO-ENTMCNC: 31.6 G/DL (ref 31–37)
MCV RBC AUTO: 91.2 FL (ref 78–100)
METHGB MFR BLD: 0.3 % (ref 0–1.4)
MONOCYTES # BLD: 0.84 K/UL (ref 0.05–1.2)
MONOCYTES NFR BLD: 8.1 % (ref 3–10)
NEUTS SEG # BLD: 6.38 K/UL (ref 1.8–8)
NEUTS SEG NFR BLD: 61.1 % (ref 40–73)
NRBC # BLD: 0 K/UL (ref 0–0.01)
NRBC BLD-RTO: 0 PER 100 WBC
OXYHGB MFR BLD: 92.2 % (ref 95–99)
PCO2 BLDA: 37 MMHG (ref 35–45)
PERFORMED BY:: ABNORMAL
PH BLDA: 7.46 (ref 7.35–7.45)
PLATELET # BLD AUTO: 351 K/UL (ref 135–420)
PMV BLD AUTO: 9.3 FL (ref 9.2–11.8)
PO2 BLDA: 65 MMHG (ref 80–100)
POTASSIUM SERPL-SCNC: 4.2 MMOL/L (ref 3.5–5.5)
PROT SERPL-MCNC: 7.5 G/DL (ref 6.4–8.2)
RBC # BLD AUTO: 4.2 M/UL (ref 4.2–5.3)
SAO2 % BLD: 93 % (ref 95–99)
SAO2% DEVICE SAO2% SENSOR NAME: ABNORMAL
SODIUM SERPL-SCNC: 137 MMOL/L (ref 136–145)
SPECIMEN SITE: ABNORMAL
TROPONIN T SERPL HS-MCNC: 21.7 NG/L (ref 0–14)
TROPONIN T SERPL HS-MCNC: 23.4 NG/L (ref 0–14)
WBC # BLD AUTO: 10.4 K/UL (ref 4.6–13.2)

## 2025-08-17 PROCEDURE — 83880 ASSAY OF NATRIURETIC PEPTIDE: CPT

## 2025-08-17 PROCEDURE — 36600 WITHDRAWAL OF ARTERIAL BLOOD: CPT

## 2025-08-17 PROCEDURE — 96372 THER/PROPH/DIAG INJ SC/IM: CPT

## 2025-08-17 PROCEDURE — 6360000002 HC RX W HCPCS: Performed by: EMERGENCY MEDICINE

## 2025-08-17 PROCEDURE — 6370000000 HC RX 637 (ALT 250 FOR IP): Performed by: NURSE PRACTITIONER

## 2025-08-17 PROCEDURE — G0378 HOSPITAL OBSERVATION PER HR: HCPCS

## 2025-08-17 PROCEDURE — 93005 ELECTROCARDIOGRAM TRACING: CPT | Performed by: EMERGENCY MEDICINE

## 2025-08-17 PROCEDURE — 82728 ASSAY OF FERRITIN: CPT

## 2025-08-17 PROCEDURE — 94761 N-INVAS EAR/PLS OXIMETRY MLT: CPT

## 2025-08-17 PROCEDURE — 85025 COMPLETE CBC W/AUTO DIFF WBC: CPT

## 2025-08-17 PROCEDURE — 83735 ASSAY OF MAGNESIUM: CPT

## 2025-08-17 PROCEDURE — 6360000002 HC RX W HCPCS: Performed by: NURSE PRACTITIONER

## 2025-08-17 PROCEDURE — 71045 X-RAY EXAM CHEST 1 VIEW: CPT

## 2025-08-17 PROCEDURE — 82803 BLOOD GASES ANY COMBINATION: CPT

## 2025-08-17 PROCEDURE — 83540 ASSAY OF IRON: CPT

## 2025-08-17 PROCEDURE — 83550 IRON BINDING TEST: CPT

## 2025-08-17 PROCEDURE — 80053 COMPREHEN METABOLIC PANEL: CPT

## 2025-08-17 PROCEDURE — 99285 EMERGENCY DEPT VISIT HI MDM: CPT

## 2025-08-17 PROCEDURE — 2500000003 HC RX 250 WO HCPCS: Performed by: NURSE PRACTITIONER

## 2025-08-17 PROCEDURE — 96374 THER/PROPH/DIAG INJ IV PUSH: CPT

## 2025-08-17 PROCEDURE — 84484 ASSAY OF TROPONIN QUANT: CPT

## 2025-08-17 RX ORDER — SODIUM CHLORIDE 0.9 % (FLUSH) 0.9 %
5-40 SYRINGE (ML) INJECTION PRN
Status: DISCONTINUED | OUTPATIENT
Start: 2025-08-17 | End: 2025-08-18 | Stop reason: HOSPADM

## 2025-08-17 RX ORDER — FUROSEMIDE 10 MG/ML
20 INJECTION INTRAMUSCULAR; INTRAVENOUS
Status: COMPLETED | OUTPATIENT
Start: 2025-08-17 | End: 2025-08-17

## 2025-08-17 RX ORDER — ENOXAPARIN SODIUM 100 MG/ML
30 INJECTION SUBCUTANEOUS DAILY
Status: DISCONTINUED | OUTPATIENT
Start: 2025-08-17 | End: 2025-08-18 | Stop reason: HOSPADM

## 2025-08-17 RX ORDER — POLYETHYLENE GLYCOL 3350 17 G/17G
17 POWDER, FOR SOLUTION ORAL DAILY PRN
Status: DISCONTINUED | OUTPATIENT
Start: 2025-08-17 | End: 2025-08-18 | Stop reason: HOSPADM

## 2025-08-17 RX ORDER — AMOXICILLIN AND CLAVULANATE POTASSIUM 500; 125 MG/1; MG/1
1 TABLET, FILM COATED ORAL 2 TIMES DAILY
Status: DISCONTINUED | OUTPATIENT
Start: 2025-08-17 | End: 2025-08-18 | Stop reason: HOSPADM

## 2025-08-17 RX ORDER — CARVEDILOL 12.5 MG/1
25 TABLET ORAL 2 TIMES DAILY WITH MEALS
Status: DISCONTINUED | OUTPATIENT
Start: 2025-08-17 | End: 2025-08-18 | Stop reason: HOSPADM

## 2025-08-17 RX ORDER — FUROSEMIDE 10 MG/ML
40 INJECTION INTRAMUSCULAR; INTRAVENOUS DAILY
Status: DISCONTINUED | OUTPATIENT
Start: 2025-08-18 | End: 2025-08-18 | Stop reason: HOSPADM

## 2025-08-17 RX ORDER — SACUBITRIL AND VALSARTAN 24; 26 MG/1; MG/1
1 TABLET ORAL 2 TIMES DAILY
Status: DISCONTINUED | OUTPATIENT
Start: 2025-08-17 | End: 2025-08-18 | Stop reason: HOSPADM

## 2025-08-17 RX ORDER — ONDANSETRON 2 MG/ML
4 INJECTION INTRAMUSCULAR; INTRAVENOUS EVERY 6 HOURS PRN
Status: DISCONTINUED | OUTPATIENT
Start: 2025-08-17 | End: 2025-08-18 | Stop reason: HOSPADM

## 2025-08-17 RX ORDER — SPIRONOLACTONE 25 MG/1
12.5 TABLET ORAL DAILY
Status: DISCONTINUED | OUTPATIENT
Start: 2025-08-17 | End: 2025-08-18 | Stop reason: HOSPADM

## 2025-08-17 RX ORDER — ONDANSETRON 4 MG/1
4 TABLET, ORALLY DISINTEGRATING ORAL EVERY 8 HOURS PRN
Status: DISCONTINUED | OUTPATIENT
Start: 2025-08-17 | End: 2025-08-18 | Stop reason: HOSPADM

## 2025-08-17 RX ORDER — ACETAMINOPHEN 650 MG/1
650 SUPPOSITORY RECTAL EVERY 6 HOURS PRN
Status: DISCONTINUED | OUTPATIENT
Start: 2025-08-17 | End: 2025-08-18 | Stop reason: HOSPADM

## 2025-08-17 RX ORDER — ACETAMINOPHEN 325 MG/1
650 TABLET ORAL EVERY 6 HOURS PRN
Status: DISCONTINUED | OUTPATIENT
Start: 2025-08-17 | End: 2025-08-18 | Stop reason: HOSPADM

## 2025-08-17 RX ORDER — ALPRAZOLAM 0.25 MG
0.25 TABLET ORAL EVERY 12 HOURS PRN
Status: DISCONTINUED | OUTPATIENT
Start: 2025-08-17 | End: 2025-08-18 | Stop reason: HOSPADM

## 2025-08-17 RX ORDER — FAMOTIDINE 20 MG/1
20 TABLET, FILM COATED ORAL EVERY OTHER DAY
Status: DISCONTINUED | OUTPATIENT
Start: 2025-08-19 | End: 2025-08-18 | Stop reason: HOSPADM

## 2025-08-17 RX ORDER — SODIUM CHLORIDE 9 MG/ML
INJECTION, SOLUTION INTRAVENOUS PRN
Status: DISCONTINUED | OUTPATIENT
Start: 2025-08-17 | End: 2025-08-18 | Stop reason: HOSPADM

## 2025-08-17 RX ORDER — SODIUM CHLORIDE 0.9 % (FLUSH) 0.9 %
5-40 SYRINGE (ML) INJECTION EVERY 12 HOURS SCHEDULED
Status: DISCONTINUED | OUTPATIENT
Start: 2025-08-17 | End: 2025-08-18 | Stop reason: HOSPADM

## 2025-08-17 RX ORDER — FERROUS GLUCONATE 324(38)MG
324 TABLET ORAL EVERY OTHER DAY
Status: DISCONTINUED | OUTPATIENT
Start: 2025-08-18 | End: 2025-08-18

## 2025-08-17 RX ADMIN — ACETAMINOPHEN 650 MG: 325 TABLET ORAL at 21:19

## 2025-08-17 RX ADMIN — SODIUM CHLORIDE, PRESERVATIVE FREE 10 ML: 5 INJECTION INTRAVENOUS at 21:21

## 2025-08-17 RX ADMIN — FUROSEMIDE 20 MG: 10 INJECTION, SOLUTION INTRAMUSCULAR; INTRAVENOUS at 12:15

## 2025-08-17 RX ADMIN — CARVEDILOL 25 MG: 12.5 TABLET, FILM COATED ORAL at 16:08

## 2025-08-17 RX ADMIN — ENOXAPARIN SODIUM 30 MG: 100 INJECTION SUBCUTANEOUS at 15:01

## 2025-08-17 RX ADMIN — AMOXICILLIN AND CLAVULANATE POTASSIUM 1 TABLET: 500; 125 TABLET, FILM COATED ORAL at 21:19

## 2025-08-17 ASSESSMENT — PAIN - FUNCTIONAL ASSESSMENT
PAIN_FUNCTIONAL_ASSESSMENT: 0-10

## 2025-08-17 ASSESSMENT — PAIN DESCRIPTION - ORIENTATION: ORIENTATION: RIGHT

## 2025-08-17 ASSESSMENT — PAIN SCALES - GENERAL
PAINLEVEL_OUTOF10: 3
PAINLEVEL_OUTOF10: 0
PAINLEVEL_OUTOF10: 0
PAINLEVEL_OUTOF10: 5
PAINLEVEL_OUTOF10: 0
PAINLEVEL_OUTOF10: 0

## 2025-08-17 ASSESSMENT — PAIN DESCRIPTION - LOCATION: LOCATION: FLANK;ABDOMEN

## 2025-08-17 ASSESSMENT — PAIN DESCRIPTION - DESCRIPTORS: DESCRIPTORS: SQUEEZING

## 2025-08-18 ENCOUNTER — APPOINTMENT (OUTPATIENT)
Age: 85
End: 2025-08-18
Payer: MEDICARE

## 2025-08-18 VITALS
BODY MASS INDEX: 27.46 KG/M2 | WEIGHT: 155 LBS | RESPIRATION RATE: 20 BRPM | SYSTOLIC BLOOD PRESSURE: 148 MMHG | HEIGHT: 63 IN | OXYGEN SATURATION: 98 % | DIASTOLIC BLOOD PRESSURE: 110 MMHG | TEMPERATURE: 97.5 F | HEART RATE: 89 BPM

## 2025-08-18 LAB
ALBUMIN SERPL-MCNC: 2.3 G/DL (ref 3.4–5)
ALBUMIN/GLOB SERPL: 0.6
ALP SERPL-CCNC: 70 U/L (ref 45–117)
ALT SERPL-CCNC: 5 U/L (ref 10–35)
ANION GAP SERPL CALC-SCNC: 11 MMOL/L (ref 3–18)
AST SERPL W P-5'-P-CCNC: 12 U/L (ref 10–38)
BILIRUB DIRECT SERPL-MCNC: 0.2 MG/DL (ref 0–0.2)
BILIRUB SERPL-MCNC: 0.4 MG/DL (ref 0.2–1)
BUN SERPL-MCNC: 12 MG/DL (ref 6–23)
BUN/CREAT SERPL: 11
CA-I BLD-MCNC: 8.6 MG/DL (ref 8.5–10.1)
CHLORIDE SERPL-SCNC: 104 MMOL/L (ref 98–107)
CO2 SERPL-SCNC: 25 MMOL/L (ref 21–32)
CREAT SERPL-MCNC: 1.11 MG/DL (ref 0.6–1.3)
ECHO AO ASC DIAM: 3.4 CM
ECHO AO ASCENDING AORTA INDEX: 1.95 CM/M2
ECHO AO ROOT DIAM: 3.1 CM
ECHO AO ROOT INDEX: 1.78 CM/M2
ECHO AV AREA PEAK VELOCITY: 2.2 CM2
ECHO AV AREA VTI: 2.1 CM2
ECHO AV AREA/BSA PEAK VELOCITY: 1.3 CM2/M2
ECHO AV AREA/BSA VTI: 1.2 CM2/M2
ECHO AV MEAN GRADIENT: 2 MMHG
ECHO AV MEAN VELOCITY: 0.7 M/S
ECHO AV PEAK GRADIENT: 4 MMHG
ECHO AV PEAK VELOCITY: 1 M/S
ECHO AV VELOCITY RATIO: 0.7
ECHO AV VTI: 16.6 CM
ECHO BSA: 1.77 M2
ECHO EST RA PRESSURE: 8 MMHG
ECHO IVC PROX: 2.5 CM
ECHO LA AREA 2C: 27.7 CM2
ECHO LA AREA 4C: 33.2 CM2
ECHO LA DIAMETER INDEX: 3.16 CM/M2
ECHO LA DIAMETER: 5.5 CM
ECHO LA MAJOR AXIS: 7.8 CM
ECHO LA MINOR AXIS: 7.1 CM
ECHO LA TO AORTIC ROOT RATIO: 1.77
ECHO LA VOL BP: 107 ML (ref 22–52)
ECHO LA VOL MOD A2C: 89 ML (ref 22–52)
ECHO LA VOL MOD A4C: 117 ML (ref 22–52)
ECHO LA VOL/BSA BIPLANE: 61 ML/M2 (ref 16–34)
ECHO LA VOLUME INDEX MOD A2C: 51 ML/M2 (ref 16–34)
ECHO LA VOLUME INDEX MOD A4C: 67 ML/M2 (ref 16–34)
ECHO LV EDV A2C: 94 ML
ECHO LV EDV A4C: 104 ML
ECHO LV EDV INDEX A4C: 60 ML/M2
ECHO LV EDV NDEX A2C: 54 ML/M2
ECHO LV EF PHYSICIAN: 25 %
ECHO LV EJECTION FRACTION A2C: 26 %
ECHO LV EJECTION FRACTION A4C: 18 %
ECHO LV EJECTION FRACTION BIPLANE: 22 % (ref 55–100)
ECHO LV ESV A2C: 70 ML
ECHO LV ESV A4C: 85 ML
ECHO LV ESV INDEX A2C: 40 ML/M2
ECHO LV ESV INDEX A4C: 49 ML/M2
ECHO LV FRACTIONAL SHORTENING: 7 % (ref 28–44)
ECHO LV INTERNAL DIMENSION DIASTOLE INDEX: 3.28 CM/M2
ECHO LV INTERNAL DIMENSION DIASTOLIC: 5.7 CM (ref 3.9–5.3)
ECHO LV INTERNAL DIMENSION SYSTOLIC INDEX: 3.05 CM/M2
ECHO LV INTERNAL DIMENSION SYSTOLIC: 5.3 CM
ECHO LV IVSD: 0.9 CM (ref 0.6–0.9)
ECHO LV MASS 2D: 226.4 G (ref 67–162)
ECHO LV MASS INDEX 2D: 130.1 G/M2 (ref 43–95)
ECHO LV POSTERIOR WALL DIASTOLIC: 1.1 CM (ref 0.6–0.9)
ECHO LV RELATIVE WALL THICKNESS RATIO: 0.39
ECHO LVOT AREA: 3.1 CM2
ECHO LVOT AV VTI INDEX: 0.68
ECHO LVOT DIAM: 2 CM
ECHO LVOT MEAN GRADIENT: 1 MMHG
ECHO LVOT PEAK GRADIENT: 2 MMHG
ECHO LVOT PEAK VELOCITY: 0.7 M/S
ECHO LVOT STROKE VOLUME INDEX: 20.4 ML/M2
ECHO LVOT SV: 35.5 ML
ECHO LVOT VTI: 11.3 CM
ECHO MAIN PULMONARY ARTERY DIAMETER: 2 CM
ECHO MV AREA VTI: 1.8 CM2
ECHO MV LVOT VTI INDEX: 1.79
ECHO MV MAX VELOCITY: 1.1 M/S
ECHO MV MEAN GRADIENT: 2 MMHG
ECHO MV MEAN VELOCITY: 0.7 M/S
ECHO MV PEAK GRADIENT: 4 MMHG
ECHO MV VTI: 20.2 CM
ECHO PV MAX VELOCITY: 0.7 M/S
ECHO PV PEAK GRADIENT: 2 MMHG
ECHO RA AREA 4C: 25.5 CM2
ECHO RA END SYSTOLIC VOLUME APICAL 4 CHAMBER INDEX BSA: 48 ML/M2
ECHO RA VOLUME: 84 ML
ECHO RIGHT VENTRICULAR SYSTOLIC PRESSURE (RVSP): 41 MMHG
ECHO RV BASAL DIMENSION: 3.6 CM
ECHO RV LONGITUDINAL DIMENSION: 5.9 CM
ECHO RV MID DIMENSION: 2.3 CM
ECHO RV TAPSE: 1.1 CM (ref 1.7–?)
ECHO TV REGURGITANT MAX VELOCITY: 2.87 M/S
ECHO TV REGURGITANT PEAK GRADIENT: 33 MMHG
EKG DIAGNOSIS: NORMAL
EKG Q-T INTERVAL: 360 MS
EKG QRS DURATION: 82 MS
EKG QTC CALCULATION (BAZETT): 487 MS
EKG R AXIS: 25 DEGREES
EKG T AXIS: 216 DEGREES
EKG VENTRICULAR RATE: 110 BPM
FERRITIN SERPL-MCNC: 74 NG/ML (ref 13–400)
GLOBULIN SER CALC-MCNC: 3.7 G/DL
GLUCOSE SERPL-MCNC: 93 MG/DL (ref 74–108)
IRON SATN MFR SERPL: 13 %
IRON SERPL-MCNC: 26 UG/DL (ref 50–175)
MAGNESIUM SERPL-MCNC: 2.2 MG/DL (ref 1.6–2.6)
POTASSIUM SERPL-SCNC: 3.4 MMOL/L (ref 3.5–5.5)
PROT SERPL-MCNC: 6 G/DL (ref 6.4–8.2)
SODIUM SERPL-SCNC: 139 MMOL/L (ref 136–145)
TIBC SERPL-MCNC: 208 UG/DL (ref 250–450)
UIBC SERPL-MCNC: 182 UG/DL (ref 112–347)

## 2025-08-18 PROCEDURE — 96372 THER/PROPH/DIAG INJ SC/IM: CPT

## 2025-08-18 PROCEDURE — 83735 ASSAY OF MAGNESIUM: CPT

## 2025-08-18 PROCEDURE — 96376 TX/PRO/DX INJ SAME DRUG ADON: CPT

## 2025-08-18 PROCEDURE — 80048 BASIC METABOLIC PNL TOTAL CA: CPT

## 2025-08-18 PROCEDURE — 93306 TTE W/DOPPLER COMPLETE: CPT

## 2025-08-18 PROCEDURE — 94761 N-INVAS EAR/PLS OXIMETRY MLT: CPT

## 2025-08-18 PROCEDURE — 6360000002 HC RX W HCPCS: Performed by: NURSE PRACTITIONER

## 2025-08-18 PROCEDURE — 36415 COLL VENOUS BLD VENIPUNCTURE: CPT

## 2025-08-18 PROCEDURE — 96375 TX/PRO/DX INJ NEW DRUG ADDON: CPT

## 2025-08-18 PROCEDURE — G0378 HOSPITAL OBSERVATION PER HR: HCPCS

## 2025-08-18 PROCEDURE — 80076 HEPATIC FUNCTION PANEL: CPT

## 2025-08-18 PROCEDURE — 6370000000 HC RX 637 (ALT 250 FOR IP): Performed by: NURSE PRACTITIONER

## 2025-08-18 PROCEDURE — 2500000003 HC RX 250 WO HCPCS: Performed by: NURSE PRACTITIONER

## 2025-08-18 RX ORDER — FERROUS GLUCONATE 324(38)MG
324 TABLET ORAL EVERY OTHER DAY
Status: DISCONTINUED | OUTPATIENT
Start: 2025-08-18 | End: 2025-08-18 | Stop reason: HOSPADM

## 2025-08-18 RX ADMIN — AMOXICILLIN AND CLAVULANATE POTASSIUM 1 TABLET: 500; 125 TABLET, FILM COATED ORAL at 08:30

## 2025-08-18 RX ADMIN — CARVEDILOL 25 MG: 12.5 TABLET, FILM COATED ORAL at 08:04

## 2025-08-18 RX ADMIN — ENOXAPARIN SODIUM 30 MG: 100 INJECTION SUBCUTANEOUS at 08:04

## 2025-08-18 RX ADMIN — FUROSEMIDE 40 MG: 10 INJECTION, SOLUTION INTRAMUSCULAR; INTRAVENOUS at 08:04

## 2025-08-18 RX ADMIN — SODIUM CHLORIDE, PRESERVATIVE FREE 10 ML: 5 INJECTION INTRAVENOUS at 08:13

## 2025-08-18 RX ADMIN — ONDANSETRON 4 MG: 2 INJECTION, SOLUTION INTRAMUSCULAR; INTRAVENOUS at 08:02

## 2025-08-18 ASSESSMENT — PAIN SCALES - GENERAL
PAINLEVEL_OUTOF10: 0
PAINLEVEL_OUTOF10: 0

## 2025-08-30 ENCOUNTER — APPOINTMENT (OUTPATIENT)
Age: 85
End: 2025-08-30
Payer: MEDICARE

## 2025-08-30 ENCOUNTER — HOSPITAL ENCOUNTER (EMERGENCY)
Age: 85
Discharge: HOME | End: 2025-08-30
Attending: EMERGENCY MEDICINE
Payer: MEDICARE

## 2025-08-30 VITALS
WEIGHT: 155 LBS | RESPIRATION RATE: 22 BRPM | OXYGEN SATURATION: 95 % | DIASTOLIC BLOOD PRESSURE: 86 MMHG | SYSTOLIC BLOOD PRESSURE: 140 MMHG | TEMPERATURE: 98.1 F | HEART RATE: 119 BPM | BODY MASS INDEX: 27.46 KG/M2 | HEIGHT: 63 IN

## 2025-08-30 DIAGNOSIS — R19.7 NAUSEA VOMITING AND DIARRHEA: ICD-10-CM

## 2025-08-30 DIAGNOSIS — R11.2 NAUSEA VOMITING AND DIARRHEA: ICD-10-CM

## 2025-08-30 DIAGNOSIS — I50.43 ACUTE ON CHRONIC COMBINED SYSTOLIC AND DIASTOLIC CHF (CONGESTIVE HEART FAILURE) (HCC): Primary | ICD-10-CM

## 2025-08-30 LAB
ALBUMIN SERPL-MCNC: 2.9 G/DL (ref 3.4–5)
ALBUMIN/GLOB SERPL: 0.6
ALP SERPL-CCNC: 69 U/L (ref 45–117)
ALT SERPL-CCNC: <5 U/L (ref 10–35)
ANION GAP SERPL CALC-SCNC: 12 MMOL/L (ref 3–18)
ARTERIAL PATENCY WRIST A: YES
AST SERPL W P-5'-P-CCNC: 14 U/L (ref 10–38)
BASE DEFICIT BLDA-SCNC: 3.7 MMOL/L
BASOPHILS # BLD: 0.08 K/UL (ref 0–0.1)
BASOPHILS NFR BLD: 0.9 % (ref 0–2)
BDY SITE: ABNORMAL
BILIRUB SERPL-MCNC: 0.9 MG/DL (ref 0.2–1)
BNP SERPL-MCNC: ABNORMAL PG/ML (ref 36–1800)
BUN SERPL-MCNC: 8 MG/DL (ref 6–23)
BUN/CREAT SERPL: 8
CA-I BLD-MCNC: 9 MG/DL (ref 8.5–10.1)
CHLORIDE SERPL-SCNC: 103 MMOL/L (ref 98–107)
CO2 SERPL-SCNC: 23 MMOL/L (ref 21–32)
COHGB MFR BLD: 1.1 % (ref 1–2)
CREAT SERPL-MCNC: 1.03 MG/DL (ref 0.6–1.3)
D DIMER PPP FEU-MCNC: 1.12 UG/ML(FEU)
DIFFERENTIAL METHOD BLD: ABNORMAL
EOSINOPHIL # BLD: 0.39 K/UL (ref 0–0.4)
EOSINOPHIL NFR BLD: 4.3 % (ref 0–5)
ERYTHROCYTE [DISTWIDTH] IN BLOOD BY AUTOMATED COUNT: 14 % (ref 11.6–14.5)
FIO2 ON VENT: 21 %
GLOBULIN SER CALC-MCNC: 4.5 G/DL
GLUCOSE SERPL-MCNC: 119 MG/DL (ref 74–108)
HCO3 BLDA-SCNC: 19 MMOL/L (ref 22–26)
HCT VFR BLD AUTO: 35.9 % (ref 35–45)
HGB BLD-MCNC: 11.6 G/DL (ref 12–16)
IMM GRANULOCYTES # BLD AUTO: 0.03 K/UL (ref 0–0.04)
IMM GRANULOCYTES NFR BLD AUTO: 0.3 % (ref 0–0.5)
LYMPHOCYTES # BLD: 2.2 K/UL (ref 0.9–3.6)
LYMPHOCYTES NFR BLD: 24.1 % (ref 21–52)
MCH RBC QN AUTO: 28.5 PG (ref 24–34)
MCHC RBC AUTO-ENTMCNC: 32.3 G/DL (ref 31–37)
MCV RBC AUTO: 88.2 FL (ref 78–100)
METHGB MFR BLD: 0.3 % (ref 0–1.4)
MONOCYTES # BLD: 0.98 K/UL (ref 0.05–1.2)
MONOCYTES NFR BLD: 10.7 % (ref 3–10)
NEUTS SEG # BLD: 5.45 K/UL (ref 1.8–8)
NEUTS SEG NFR BLD: 59.7 % (ref 40–73)
NRBC # BLD: 0 K/UL (ref 0–0.01)
NRBC BLD-RTO: 0 PER 100 WBC
OXYHGB MFR BLD: 92.6 % (ref 95–99)
PCO2 BLDA: 26 MMHG (ref 35–45)
PERFORMED BY:: ABNORMAL
PH BLDA: 7.47 (ref 7.35–7.45)
PLATELET # BLD AUTO: 303 K/UL (ref 135–420)
PMV BLD AUTO: 9.2 FL (ref 9.2–11.8)
PO2 BLDA: 71 MMHG (ref 80–100)
POTASSIUM SERPL-SCNC: 3.6 MMOL/L (ref 3.5–5.5)
PROT SERPL-MCNC: 7.4 G/DL (ref 6.4–8.2)
RBC # BLD AUTO: 4.07 M/UL (ref 4.2–5.3)
SAO2 % BLD: 94 % (ref 95–99)
SAO2% DEVICE SAO2% SENSOR NAME: ABNORMAL
SODIUM SERPL-SCNC: 138 MMOL/L (ref 136–145)
SPECIMEN SITE: ABNORMAL
TROPONIN T SERPL HS-MCNC: 24.4 NG/L (ref 0–14)
TROPONIN T SERPL HS-MCNC: 25.1 NG/L (ref 0–14)
WBC # BLD AUTO: 9.1 K/UL (ref 4.6–13.2)

## 2025-08-30 PROCEDURE — 6360000004 HC RX CONTRAST MEDICATION: Performed by: EMERGENCY MEDICINE

## 2025-08-30 PROCEDURE — 82803 BLOOD GASES ANY COMBINATION: CPT

## 2025-08-30 PROCEDURE — 96375 TX/PRO/DX INJ NEW DRUG ADDON: CPT

## 2025-08-30 PROCEDURE — 36600 WITHDRAWAL OF ARTERIAL BLOOD: CPT

## 2025-08-30 PROCEDURE — 71045 X-RAY EXAM CHEST 1 VIEW: CPT

## 2025-08-30 PROCEDURE — 84484 ASSAY OF TROPONIN QUANT: CPT

## 2025-08-30 PROCEDURE — 6370000000 HC RX 637 (ALT 250 FOR IP): Performed by: EMERGENCY MEDICINE

## 2025-08-30 PROCEDURE — 85379 FIBRIN DEGRADATION QUANT: CPT

## 2025-08-30 PROCEDURE — 85025 COMPLETE CBC W/AUTO DIFF WBC: CPT

## 2025-08-30 PROCEDURE — 93005 ELECTROCARDIOGRAM TRACING: CPT | Performed by: EMERGENCY MEDICINE

## 2025-08-30 PROCEDURE — 83880 ASSAY OF NATRIURETIC PEPTIDE: CPT

## 2025-08-30 PROCEDURE — 71275 CT ANGIOGRAPHY CHEST: CPT

## 2025-08-30 PROCEDURE — 2500000003 HC RX 250 WO HCPCS: Performed by: EMERGENCY MEDICINE

## 2025-08-30 PROCEDURE — 74177 CT ABD & PELVIS W/CONTRAST: CPT

## 2025-08-30 PROCEDURE — 94640 AIRWAY INHALATION TREATMENT: CPT

## 2025-08-30 PROCEDURE — 99285 EMERGENCY DEPT VISIT HI MDM: CPT

## 2025-08-30 PROCEDURE — 80053 COMPREHEN METABOLIC PANEL: CPT

## 2025-08-30 PROCEDURE — 6360000002 HC RX W HCPCS: Performed by: EMERGENCY MEDICINE

## 2025-08-30 PROCEDURE — 96374 THER/PROPH/DIAG INJ IV PUSH: CPT

## 2025-08-30 RX ORDER — ONDANSETRON 4 MG/1
4 TABLET, ORALLY DISINTEGRATING ORAL 3 TIMES DAILY PRN
Qty: 10 TABLET | Refills: 0 | Status: SHIPPED | OUTPATIENT
Start: 2025-08-30

## 2025-08-30 RX ORDER — PANTOPRAZOLE SODIUM 40 MG/10ML
40 INJECTION, POWDER, LYOPHILIZED, FOR SOLUTION INTRAVENOUS
Status: COMPLETED | OUTPATIENT
Start: 2025-08-30 | End: 2025-08-30

## 2025-08-30 RX ORDER — IOPAMIDOL 755 MG/ML
100 INJECTION, SOLUTION INTRAVASCULAR
Status: COMPLETED | OUTPATIENT
Start: 2025-08-30 | End: 2025-08-30

## 2025-08-30 RX ORDER — FUROSEMIDE 10 MG/ML
40 INJECTION INTRAMUSCULAR; INTRAVENOUS ONCE
Status: COMPLETED | OUTPATIENT
Start: 2025-08-30 | End: 2025-08-30

## 2025-08-30 RX ORDER — OMEPRAZOLE 20 MG/1
20 CAPSULE, DELAYED RELEASE ORAL
Qty: 20 CAPSULE | Refills: 0 | Status: SHIPPED | OUTPATIENT
Start: 2025-08-30

## 2025-08-30 RX ORDER — DICYCLOMINE HYDROCHLORIDE 10 MG/1
20 CAPSULE ORAL
Status: COMPLETED | OUTPATIENT
Start: 2025-08-30 | End: 2025-08-30

## 2025-08-30 RX ORDER — IPRATROPIUM BROMIDE AND ALBUTEROL SULFATE 2.5; .5 MG/3ML; MG/3ML
1 SOLUTION RESPIRATORY (INHALATION)
Status: COMPLETED | OUTPATIENT
Start: 2025-08-30 | End: 2025-08-30

## 2025-08-30 RX ORDER — CARVEDILOL 12.5 MG/1
25 TABLET ORAL
Status: COMPLETED | OUTPATIENT
Start: 2025-08-30 | End: 2025-08-30

## 2025-08-30 RX ORDER — ONDANSETRON 2 MG/ML
4 INJECTION INTRAMUSCULAR; INTRAVENOUS
Status: COMPLETED | OUTPATIENT
Start: 2025-08-30 | End: 2025-08-30

## 2025-08-30 RX ADMIN — ONDANSETRON 4 MG: 2 INJECTION, SOLUTION INTRAMUSCULAR; INTRAVENOUS at 17:49

## 2025-08-30 RX ADMIN — PANTOPRAZOLE SODIUM 40 MG: 40 INJECTION, POWDER, FOR SOLUTION INTRAVENOUS at 17:49

## 2025-08-30 RX ADMIN — IOPAMIDOL 100 ML: 755 INJECTION, SOLUTION INTRAVENOUS at 19:06

## 2025-08-30 RX ADMIN — DICYCLOMINE HYDROCHLORIDE 20 MG: 10 CAPSULE ORAL at 20:12

## 2025-08-30 RX ADMIN — CARVEDILOL 25 MG: 12.5 TABLET, FILM COATED ORAL at 21:07

## 2025-08-30 RX ADMIN — FUROSEMIDE 40 MG: 10 INJECTION, SOLUTION INTRAMUSCULAR; INTRAVENOUS at 20:15

## 2025-08-30 RX ADMIN — WATER 80 MG: 1 INJECTION INTRAMUSCULAR; INTRAVENOUS; SUBCUTANEOUS at 20:12

## 2025-08-30 RX ADMIN — IPRATROPIUM BROMIDE AND ALBUTEROL SULFATE 1 DOSE: .5; 2.5 SOLUTION RESPIRATORY (INHALATION) at 20:08

## 2025-08-30 ASSESSMENT — PAIN DESCRIPTION - DESCRIPTORS
DESCRIPTORS: DULL
DESCRIPTORS: DISCOMFORT

## 2025-08-30 ASSESSMENT — PAIN SCALES - GENERAL
PAINLEVEL_OUTOF10: 5
PAINLEVEL_OUTOF10: 4
PAINLEVEL_OUTOF10: 0

## 2025-08-30 ASSESSMENT — PAIN - FUNCTIONAL ASSESSMENT
PAIN_FUNCTIONAL_ASSESSMENT: 0-10

## 2025-08-30 ASSESSMENT — PAIN DESCRIPTION - LOCATION
LOCATION: ABDOMEN
LOCATION: ABDOMEN

## 2025-08-30 ASSESSMENT — PAIN DESCRIPTION - ORIENTATION: ORIENTATION: RIGHT

## 2025-08-31 LAB
EKG DIAGNOSIS: NORMAL
EKG Q-T INTERVAL: 380 MS
EKG QRS DURATION: 78 MS
EKG QTC CALCULATION (BAZETT): 469 MS
EKG R AXIS: 61 DEGREES
EKG T AXIS: 249 DEGREES
EKG VENTRICULAR RATE: 92 BPM

## (undated) DEVICE — CATHETER SUCT TR FL TIP 14FR W/ O CTRL

## (undated) DEVICE — LINER SUCT CANSTR 3000CC PLAS SFT PRE ASSEMB W/OUT TBNG W/

## (undated) DEVICE — SOLUTION IRRIG 1000ML H2O STRL BLT

## (undated) DEVICE — FORCEPS BX L240CM JAW DIA2.4MM ORNG L CAP W/ NDL DISP RAD

## (undated) DEVICE — CANNULA NSL AD TBNG L14FT STD PVC O2 CRV CONN NONFLARED NSL

## (undated) DEVICE — GAUZE,SPONGE,4"X4",16PLY,STRL,LF,10/TRAY: Brand: MEDLINE

## (undated) DEVICE — BASIN EMSIS 16OZ GRAPHITE PLAS KID SHP MOLD GRAD FOR ORAL

## (undated) DEVICE — MEDI-VAC NON-CONDUCTIVE SUCTION TUBING: Brand: CARDINAL HEALTH

## (undated) DEVICE — YANKAUER,SMOOTH HANDLE,HIGH CAPACITY: Brand: MEDLINE INDUSTRIES, INC.

## (undated) DEVICE — CANNULA ORIG TL CLR W FOAM CUSHIONS AND 14FT SUPL TB 3 CHN

## (undated) DEVICE — SYRINGE MED 25GA 3ML L5/8IN SUBQ PLAS W/ DETACH NDL SFTY

## (undated) DEVICE — BITE BLOCK ENDOSCP UNIV AD 6 TO 9.4 MM

## (undated) DEVICE — UNDERPAD INCONT W23XL36IN STD BLU POLYPR BK FLUF SFT

## (undated) DEVICE — ENDOSCOPY PUMP TUBING/ CAP SET: Brand: ERBE

## (undated) DEVICE — SYRINGE MED 50ML LUERSLIP TIP

## (undated) DEVICE — STERILE POLYISOPRENE POWDER-FREE SURGICAL GLOVES: Brand: PROTEXIS

## (undated) DEVICE — SYR 50ML SLIP TIP NSAF LF STRL --

## (undated) DEVICE — GOWN PLASTIC FILM THMBHKS UNIV BLUE: Brand: CARDINAL HEALTH

## (undated) DEVICE — SYRINGE MED 10ML LUERLOCK TIP W/O SFTY DISP

## (undated) DEVICE — SYRINGE 20ML LL S/C 50

## (undated) DEVICE — FLUFF AND POLYMER UNDERPAD,EXTRA HEAVY: Brand: WINGS